# Patient Record
Sex: FEMALE | Race: ASIAN | Employment: OTHER | URBAN - METROPOLITAN AREA
[De-identification: names, ages, dates, MRNs, and addresses within clinical notes are randomized per-mention and may not be internally consistent; named-entity substitution may affect disease eponyms.]

---

## 2017-03-01 ENCOUNTER — APPOINTMENT (OUTPATIENT)
Dept: GENERAL RADIOLOGY | Facility: HOSPITAL | Age: 72
DRG: 481 | End: 2017-03-01
Attending: ORTHOPAEDIC SURGERY
Payer: MEDICARE

## 2017-03-01 ENCOUNTER — ANESTHESIA (OUTPATIENT)
Dept: SURGERY | Facility: HOSPITAL | Age: 72
DRG: 481 | End: 2017-03-01
Payer: MEDICARE

## 2017-03-01 ENCOUNTER — HOSPITAL ENCOUNTER (INPATIENT)
Facility: HOSPITAL | Age: 72
LOS: 5 days | Discharge: NURSING, REHAB OR LONG-TERM CARE | DRG: 481 | End: 2017-03-06
Attending: EMERGENCY MEDICINE | Admitting: INTERNAL MEDICINE
Payer: MEDICARE

## 2017-03-01 ENCOUNTER — ANESTHESIA EVENT (OUTPATIENT)
Dept: SURGERY | Facility: HOSPITAL | Age: 72
DRG: 481 | End: 2017-03-01
Payer: MEDICARE

## 2017-03-01 ENCOUNTER — APPOINTMENT (OUTPATIENT)
Dept: GENERAL RADIOLOGY | Facility: HOSPITAL | Age: 72
DRG: 481 | End: 2017-03-01
Attending: EMERGENCY MEDICINE
Payer: MEDICARE

## 2017-03-01 ENCOUNTER — SURGERY (OUTPATIENT)
Age: 72
End: 2017-03-01

## 2017-03-01 DIAGNOSIS — S72.002A CLOSED LEFT HIP FRACTURE, INITIAL ENCOUNTER (HCC): Primary | ICD-10-CM

## 2017-03-01 LAB
ANION GAP SERPL CALC-SCNC: 9 MMOL/L (ref 0–18)
ANTIBODY SCREEN: NEGATIVE
APTT PPP: 26.4 SECONDS (ref 23.2–35.3)
BASOPHILS # BLD: 0 K/UL (ref 0–0.2)
BASOPHILS NFR BLD: 0 %
BUN SERPL-MCNC: 8 MG/DL (ref 8–20)
BUN/CREAT SERPL: 10 (ref 10–20)
CALCIUM SERPL-MCNC: 8.5 MG/DL (ref 8.5–10.5)
CHLORIDE SERPL-SCNC: 101 MMOL/L (ref 95–110)
CO2 SERPL-SCNC: 24 MMOL/L (ref 22–32)
CREAT SERPL-MCNC: 0.8 MG/DL (ref 0.5–1.5)
EOSINOPHIL # BLD: 0.1 K/UL (ref 0–0.7)
EOSINOPHIL NFR BLD: 1 %
ERYTHROCYTE [DISTWIDTH] IN BLOOD BY AUTOMATED COUNT: 12.8 % (ref 11–15)
ERYTHROCYTE [DISTWIDTH] IN BLOOD BY AUTOMATED COUNT: 12.8 % (ref 11–15)
GLUCOSE SERPL-MCNC: 135 MG/DL (ref 70–99)
HCT VFR BLD AUTO: 31 % (ref 35–48)
HCT VFR BLD AUTO: 36 % (ref 35–48)
HGB BLD-MCNC: 10.4 G/DL (ref 12–16)
HGB BLD-MCNC: 12.1 G/DL (ref 12–16)
INR BLD: 1 (ref 0.9–1.2)
LYMPHOCYTES # BLD: 1.7 K/UL (ref 1–4)
LYMPHOCYTES NFR BLD: 25 %
MCH RBC QN AUTO: 31.6 PG (ref 27–32)
MCH RBC QN AUTO: 31.7 PG (ref 27–32)
MCHC RBC AUTO-ENTMCNC: 33.5 G/DL (ref 32–37)
MCHC RBC AUTO-ENTMCNC: 33.6 G/DL (ref 32–37)
MCV RBC AUTO: 94.2 FL (ref 80–100)
MCV RBC AUTO: 94.4 FL (ref 80–100)
MONOCYTES # BLD: 0.3 K/UL (ref 0–1)
MONOCYTES NFR BLD: 5 %
MRSA DNA SPEC QL NAA+PROBE: NEGATIVE
NEUTROPHILS # BLD AUTO: 4.4 K/UL (ref 1.8–7.7)
NEUTROPHILS NFR BLD: 68 %
OSMOLALITY UR CALC.SUM OF ELEC: 278 MOSM/KG (ref 275–295)
PLATELET # BLD AUTO: 141 K/UL (ref 140–400)
PLATELET # BLD AUTO: 173 K/UL (ref 140–400)
PMV BLD AUTO: 6.7 FL (ref 7.4–10.3)
PMV BLD AUTO: 6.7 FL (ref 7.4–10.3)
POTASSIUM SERPL-SCNC: 3.7 MMOL/L (ref 3.3–5.1)
PROTHROMBIN TIME: 12.4 SECONDS (ref 11.8–14.5)
RBC # BLD AUTO: 3.29 M/UL (ref 3.7–5.4)
RBC # BLD AUTO: 3.82 M/UL (ref 3.7–5.4)
RH BLOOD TYPE: POSITIVE
SODIUM SERPL-SCNC: 134 MMOL/L (ref 136–144)
WBC # BLD AUTO: 6.5 K/UL (ref 4–11)
WBC # BLD AUTO: 9.1 K/UL (ref 4–11)

## 2017-03-01 PROCEDURE — 36415 COLL VENOUS BLD VENIPUNCTURE: CPT | Performed by: ORTHOPAEDIC SURGERY

## 2017-03-01 PROCEDURE — 71010 XR CHEST AP PORTABLE  (CPT=71010): CPT

## 2017-03-01 PROCEDURE — 76000 FLUOROSCOPY <1 HR PHYS/QHP: CPT

## 2017-03-01 PROCEDURE — 0QS736Z REPOSITION LEFT UPPER FEMUR WITH INTRAMEDULLARY INTERNAL FIXATION DEVICE, PERCUTANEOUS APPROACH: ICD-10-PCS | Performed by: ORTHOPAEDIC SURGERY

## 2017-03-01 PROCEDURE — 86901 BLOOD TYPING SEROLOGIC RH(D): CPT | Performed by: EMERGENCY MEDICINE

## 2017-03-01 PROCEDURE — 96375 TX/PRO/DX INJ NEW DRUG ADDON: CPT

## 2017-03-01 PROCEDURE — 73552 X-RAY EXAM OF FEMUR 2/>: CPT

## 2017-03-01 PROCEDURE — 85027 COMPLETE CBC AUTOMATED: CPT | Performed by: ORTHOPAEDIC SURGERY

## 2017-03-01 PROCEDURE — 87641 MR-STAPH DNA AMP PROBE: CPT | Performed by: EMERGENCY MEDICINE

## 2017-03-01 PROCEDURE — 85025 COMPLETE CBC W/AUTO DIFF WBC: CPT | Performed by: EMERGENCY MEDICINE

## 2017-03-01 PROCEDURE — 99285 EMERGENCY DEPT VISIT HI MDM: CPT

## 2017-03-01 PROCEDURE — 93010 ELECTROCARDIOGRAM REPORT: CPT | Performed by: EMERGENCY MEDICINE

## 2017-03-01 PROCEDURE — 73502 X-RAY EXAM HIP UNI 2-3 VIEWS: CPT

## 2017-03-01 PROCEDURE — 80048 BASIC METABOLIC PNL TOTAL CA: CPT | Performed by: EMERGENCY MEDICINE

## 2017-03-01 PROCEDURE — 96361 HYDRATE IV INFUSION ADD-ON: CPT

## 2017-03-01 PROCEDURE — 96374 THER/PROPH/DIAG INJ IV PUSH: CPT

## 2017-03-01 PROCEDURE — 93005 ELECTROCARDIOGRAM TRACING: CPT

## 2017-03-01 PROCEDURE — 51702 INSERT TEMP BLADDER CATH: CPT

## 2017-03-01 PROCEDURE — 86900 BLOOD TYPING SEROLOGIC ABO: CPT | Performed by: EMERGENCY MEDICINE

## 2017-03-01 PROCEDURE — 85730 THROMBOPLASTIN TIME PARTIAL: CPT | Performed by: EMERGENCY MEDICINE

## 2017-03-01 PROCEDURE — 86850 RBC ANTIBODY SCREEN: CPT | Performed by: EMERGENCY MEDICINE

## 2017-03-01 PROCEDURE — 85610 PROTHROMBIN TIME: CPT | Performed by: EMERGENCY MEDICINE

## 2017-03-01 DEVICE — ZNN CMN NAIL 10MMX34CM 130 L
Type: IMPLANTABLE DEVICE | Status: FUNCTIONAL
Brand: ZIMMER® NATURAL NAIL® SYSTEM

## 2017-03-01 DEVICE — SCREW CORT FA 5.0X30 Z NAIL: Type: IMPLANTABLE DEVICE | Status: FUNCTIONAL

## 2017-03-01 DEVICE — ZNN CMN LAG SCREW 10.5X100
Type: IMPLANTABLE DEVICE | Status: FUNCTIONAL
Brand: ZIMMER® NATURAL NAIL® SYSTEM

## 2017-03-01 RX ORDER — MORPHINE SULFATE 2 MG/ML
2 INJECTION, SOLUTION INTRAMUSCULAR; INTRAVENOUS EVERY 10 MIN PRN
Status: DISCONTINUED | OUTPATIENT
Start: 2017-03-01 | End: 2017-03-01

## 2017-03-01 RX ORDER — GLYCOPYRROLATE 0.2 MG/ML
INJECTION INTRAMUSCULAR; INTRAVENOUS AS NEEDED
Status: DISCONTINUED | OUTPATIENT
Start: 2017-03-01 | End: 2017-03-01 | Stop reason: SURG

## 2017-03-01 RX ORDER — ONDANSETRON 2 MG/ML
4 INJECTION INTRAMUSCULAR; INTRAVENOUS ONCE
Status: COMPLETED | OUTPATIENT
Start: 2017-03-01 | End: 2017-03-01

## 2017-03-01 RX ORDER — HYDROMORPHONE HYDROCHLORIDE 1 MG/ML
0.5 INJECTION, SOLUTION INTRAMUSCULAR; INTRAVENOUS; SUBCUTANEOUS EVERY 2 HOUR PRN
Status: DISCONTINUED | OUTPATIENT
Start: 2017-03-01 | End: 2017-03-01

## 2017-03-01 RX ORDER — HYDROMORPHONE HYDROCHLORIDE 1 MG/ML
0.4 INJECTION, SOLUTION INTRAMUSCULAR; INTRAVENOUS; SUBCUTANEOUS EVERY 5 MIN PRN
Status: DISCONTINUED | OUTPATIENT
Start: 2017-03-01 | End: 2017-03-01

## 2017-03-01 RX ORDER — GLYCOPYRROLATE 0.2 MG/ML
INJECTION INTRAMUSCULAR; INTRAVENOUS AS NEEDED
Status: DISCONTINUED | OUTPATIENT
Start: 2017-03-01 | End: 2017-03-01

## 2017-03-01 RX ORDER — NEOSTIGMINE METHYLSULFATE 0.5 MG/ML
INJECTION INTRAVENOUS AS NEEDED
Status: DISCONTINUED | OUTPATIENT
Start: 2017-03-01 | End: 2017-03-01 | Stop reason: SURG

## 2017-03-01 RX ORDER — OYSTER SHELL CALCIUM WITH VITAMIN D 500; 200 MG/1; [IU]/1
1 TABLET, FILM COATED ORAL DAILY
Status: DISCONTINUED | OUTPATIENT
Start: 2017-03-02 | End: 2017-03-06

## 2017-03-01 RX ORDER — MORPHINE SULFATE 4 MG/ML
4 INJECTION, SOLUTION INTRAMUSCULAR; INTRAVENOUS EVERY 10 MIN PRN
Status: DISCONTINUED | OUTPATIENT
Start: 2017-03-01 | End: 2017-03-01

## 2017-03-01 RX ORDER — HYDROMORPHONE HYDROCHLORIDE 1 MG/ML
INJECTION, SOLUTION INTRAMUSCULAR; INTRAVENOUS; SUBCUTANEOUS
Status: COMPLETED
Start: 2017-03-01 | End: 2017-03-01

## 2017-03-01 RX ORDER — NALOXONE HYDROCHLORIDE 0.4 MG/ML
80 INJECTION, SOLUTION INTRAMUSCULAR; INTRAVENOUS; SUBCUTANEOUS AS NEEDED
Status: DISCONTINUED | OUTPATIENT
Start: 2017-03-01 | End: 2017-03-02 | Stop reason: HOSPADM

## 2017-03-01 RX ORDER — DOXEPIN HYDROCHLORIDE 50 MG/1
1 CAPSULE ORAL DAILY
COMMUNITY
End: 2021-07-07

## 2017-03-01 RX ORDER — ONDANSETRON 2 MG/ML
INJECTION INTRAMUSCULAR; INTRAVENOUS AS NEEDED
Status: DISCONTINUED | OUTPATIENT
Start: 2017-03-01 | End: 2017-03-01 | Stop reason: SURG

## 2017-03-01 RX ORDER — HYDROMORPHONE HYDROCHLORIDE 1 MG/ML
0.6 INJECTION, SOLUTION INTRAMUSCULAR; INTRAVENOUS; SUBCUTANEOUS EVERY 5 MIN PRN
Status: DISCONTINUED | OUTPATIENT
Start: 2017-03-01 | End: 2017-03-01

## 2017-03-01 RX ORDER — CHLORAL HYDRATE 500 MG
1000 CAPSULE ORAL 2 TIMES DAILY
Status: DISCONTINUED | OUTPATIENT
Start: 2017-03-02 | End: 2017-03-01

## 2017-03-01 RX ORDER — HALOPERIDOL 5 MG/ML
0.25 INJECTION INTRAMUSCULAR ONCE AS NEEDED
Status: ACTIVE | OUTPATIENT
Start: 2017-03-01 | End: 2017-03-01

## 2017-03-01 RX ORDER — SODIUM CHLORIDE 450 MG/100ML
INJECTION, SOLUTION INTRAVENOUS CONTINUOUS
Status: DISCONTINUED | OUTPATIENT
Start: 2017-03-01 | End: 2017-03-03 | Stop reason: ALTCHOICE

## 2017-03-01 RX ORDER — LOSARTAN POTASSIUM 25 MG/1
TABLET ORAL DAILY
COMMUNITY
End: 2018-05-22 | Stop reason: ALTCHOICE

## 2017-03-01 RX ORDER — CHLORAL HYDRATE 500 MG
1000 CAPSULE ORAL 2 TIMES DAILY
COMMUNITY
End: 2020-11-03

## 2017-03-01 RX ORDER — LIDOCAINE HYDROCHLORIDE 10 MG/ML
INJECTION, SOLUTION EPIDURAL; INFILTRATION; INTRACAUDAL; PERINEURAL AS NEEDED
Status: DISCONTINUED | OUTPATIENT
Start: 2017-03-01 | End: 2017-03-01 | Stop reason: SURG

## 2017-03-01 RX ORDER — ONDANSETRON 2 MG/ML
4 INJECTION INTRAMUSCULAR; INTRAVENOUS ONCE AS NEEDED
Status: ACTIVE | OUTPATIENT
Start: 2017-03-01 | End: 2017-03-01

## 2017-03-01 RX ORDER — HYDROCODONE BITARTRATE AND ACETAMINOPHEN 5; 325 MG/1; MG/1
2 TABLET ORAL AS NEEDED
Status: DISCONTINUED | OUTPATIENT
Start: 2017-03-01 | End: 2017-03-01

## 2017-03-01 RX ORDER — CHOLECALCIFEROL (VITAMIN D3) 50 MCG
1 CAPSULE ORAL DAILY
COMMUNITY

## 2017-03-01 RX ORDER — ROCURONIUM BROMIDE 10 MG/ML
INJECTION, SOLUTION INTRAVENOUS AS NEEDED
Status: DISCONTINUED | OUTPATIENT
Start: 2017-03-01 | End: 2017-03-01 | Stop reason: SURG

## 2017-03-01 RX ORDER — ONDANSETRON 2 MG/ML
4 INJECTION INTRAMUSCULAR; INTRAVENOUS EVERY 4 HOURS PRN
Status: DISCONTINUED | OUTPATIENT
Start: 2017-03-01 | End: 2017-03-06

## 2017-03-01 RX ORDER — HYDROMORPHONE HYDROCHLORIDE 1 MG/ML
0.2 INJECTION, SOLUTION INTRAMUSCULAR; INTRAVENOUS; SUBCUTANEOUS EVERY 5 MIN PRN
Status: DISCONTINUED | OUTPATIENT
Start: 2017-03-01 | End: 2017-03-01

## 2017-03-01 RX ORDER — HYDROMORPHONE HYDROCHLORIDE 1 MG/ML
0.5 INJECTION, SOLUTION INTRAMUSCULAR; INTRAVENOUS; SUBCUTANEOUS ONCE
Status: COMPLETED | OUTPATIENT
Start: 2017-03-01 | End: 2017-03-01

## 2017-03-01 RX ORDER — POTASSIUM CHLORIDE 14.9 MG/ML
20 INJECTION INTRAVENOUS ONCE
Status: COMPLETED | OUTPATIENT
Start: 2017-03-01 | End: 2017-03-01

## 2017-03-01 RX ORDER — ERGOCALCIFEROL 1.25 MG/1
50000 CAPSULE ORAL
COMMUNITY

## 2017-03-01 RX ORDER — LOSARTAN POTASSIUM 25 MG/1
25 TABLET ORAL DAILY
Status: DISCONTINUED | OUTPATIENT
Start: 2017-03-02 | End: 2017-03-03

## 2017-03-01 RX ORDER — SODIUM CHLORIDE, SODIUM LACTATE, POTASSIUM CHLORIDE, CALCIUM CHLORIDE 600; 310; 30; 20 MG/100ML; MG/100ML; MG/100ML; MG/100ML
INJECTION, SOLUTION INTRAVENOUS CONTINUOUS
Status: DISCONTINUED | OUTPATIENT
Start: 2017-03-01 | End: 2017-03-06

## 2017-03-01 RX ORDER — DOXEPIN HYDROCHLORIDE 50 MG/1
1 CAPSULE ORAL DAILY
Status: DISCONTINUED | OUTPATIENT
Start: 2017-03-02 | End: 2017-03-06

## 2017-03-01 RX ORDER — METOPROLOL SUCCINATE 50 MG/1
50 TABLET, EXTENDED RELEASE ORAL DAILY
Status: DISCONTINUED | OUTPATIENT
Start: 2017-03-02 | End: 2017-03-03

## 2017-03-01 RX ORDER — METOPROLOL SUCCINATE 50 MG/1
50 TABLET, EXTENDED RELEASE ORAL 2 TIMES DAILY
Status: ON HOLD | COMMUNITY
End: 2019-03-20

## 2017-03-01 RX ORDER — MORPHINE SULFATE 10 MG/ML
6 INJECTION, SOLUTION INTRAMUSCULAR; INTRAVENOUS EVERY 10 MIN PRN
Status: DISCONTINUED | OUTPATIENT
Start: 2017-03-01 | End: 2017-03-01

## 2017-03-01 RX ORDER — SODIUM CHLORIDE, SODIUM LACTATE, POTASSIUM CHLORIDE, CALCIUM CHLORIDE 600; 310; 30; 20 MG/100ML; MG/100ML; MG/100ML; MG/100ML
INJECTION, SOLUTION INTRAVENOUS CONTINUOUS PRN
Status: DISCONTINUED | OUTPATIENT
Start: 2017-03-01 | End: 2017-03-01 | Stop reason: SURG

## 2017-03-01 RX ORDER — DEXAMETHASONE SODIUM PHOSPHATE 4 MG/ML
VIAL (ML) INJECTION AS NEEDED
Status: DISCONTINUED | OUTPATIENT
Start: 2017-03-01 | End: 2017-03-01 | Stop reason: SURG

## 2017-03-01 RX ORDER — HYDROCODONE BITARTRATE AND ACETAMINOPHEN 5; 325 MG/1; MG/1
1 TABLET ORAL AS NEEDED
Status: DISCONTINUED | OUTPATIENT
Start: 2017-03-01 | End: 2017-03-01

## 2017-03-01 RX ORDER — ERGOCALCIFEROL 1.25 MG/1
50000 CAPSULE ORAL
Status: DISCONTINUED | OUTPATIENT
Start: 2017-03-06 | End: 2017-03-06

## 2017-03-01 RX ADMIN — LIDOCAINE HYDROCHLORIDE 50 MG: 10 INJECTION, SOLUTION EPIDURAL; INFILTRATION; INTRACAUDAL; PERINEURAL at 19:56:00

## 2017-03-01 RX ADMIN — ROCURONIUM BROMIDE 20 MG: 10 INJECTION, SOLUTION INTRAVENOUS at 20:20:00

## 2017-03-01 RX ADMIN — SODIUM CHLORIDE, SODIUM LACTATE, POTASSIUM CHLORIDE, CALCIUM CHLORIDE: 600; 310; 30; 20 INJECTION, SOLUTION INTRAVENOUS at 19:50:00

## 2017-03-01 RX ADMIN — ROCURONIUM BROMIDE 30 MG: 10 INJECTION, SOLUTION INTRAVENOUS at 19:56:00

## 2017-03-01 RX ADMIN — NEOSTIGMINE METHYLSULFATE 3 MG: 0.5 INJECTION INTRAVENOUS at 22:15:00

## 2017-03-01 RX ADMIN — GLYCOPYRROLATE 0.6 MG: 0.2 INJECTION INTRAMUSCULAR; INTRAVENOUS at 22:15:00

## 2017-03-01 RX ADMIN — DEXAMETHASONE SODIUM PHOSPHATE 4 MG: 4 MG/ML VIAL (ML) INJECTION at 20:25:00

## 2017-03-01 RX ADMIN — ONDANSETRON 4 MG: 2 INJECTION INTRAMUSCULAR; INTRAVENOUS at 21:15:00

## 2017-03-01 RX ADMIN — SODIUM CHLORIDE, SODIUM LACTATE, POTASSIUM CHLORIDE, CALCIUM CHLORIDE: 600; 310; 30; 20 INJECTION, SOLUTION INTRAVENOUS at 22:00:00

## 2017-03-01 NOTE — ED PROVIDER NOTES
Patient Seen in: [facility] AND [facility] Emergency Department    History   Patient presents with:  Lower Extremity Injury (musculoskeletal)    Stated Complaint: fall, l hip pain     HPI    74 yo F with PMH HTN presenting for evaluation of left hip pain while doi MMM.  Head: Normocephalic. Atraumatic. Eyes: No injection. Pupils midrange and equally reactive. Neck: Neck supple. No midline tenderness/stepoff/deformity. Cardiovascular: RRR. Pulmonary/Chest: Effort normal. CTAB. Nontender. Abdominal: Soft.  Nonten

## 2017-03-01 NOTE — PROGRESS NOTES
Pharmacy Note: Dietary Supplement Discontinuation Per Policy    Omega-3 fatty acids (Fish Oil) has been discontinued on Quynh T Ni per policy. This supplement may be restarted upon discharge using the medication reconciliation process.     Thank you,   ELIO

## 2017-03-01 NOTE — H&P
[place] [facility]    H&P  [facility]    Robel Small Patient Status:  Inpatient    1945 MRN [id number]   Location [facility] 1SW Attending Yolis Guido MD   Hosp Day # 0 PCP Sosa Gage MD     Date of Admission tablet, 1 tablet, Oral, Daily    ROS:  Constitutional: negative  Eyes: negative  Ears, nose, mouth, throat, and face: negative  Respiratory: negative  Cardiovascular: negative  Gastrointestinal: negative  Genitourinary:negative  Integument/breast: negative Hypertension    Plan:    Cardiology evaluation before OR  Pain control     Discussed with Dr. Nafisa Robert MD  3/1/2017  5:17 PM

## 2017-03-01 NOTE — HISTORICAL OFFICE NOTE
TEX HERNANDEZ  : 1945  ACCOUNT:  [id number]  [phone]  PCP: Dr. Ulysses Inoue     TODAY'S DATE: 10/04/2016  DICTATED BY:  Pilar Long M.D.]    CHIEF COMPLAINT: [Followup of Palpitations.]    HPI: [On 10/04/2016, Radha Spann, a 73year old female, presen mucosa pink and moist. NECK: jugular venous pressure not elevated. RESP: respirations with normal rate and rhythm, clear to auscultation. GI: soft, nontender. MS: adequate gait for exercise/testing. EXT: no clubbing or cyanosis.   SKIN: no rashes, lesions, Transthoracic echocardiography. Image quality was good.   Scanning was performed from the parasternal, apical, and subcostal acoustic windows. Study completion: The patient tolerated the procedure well. There   were no complications.  Transthoracic echocard 04/20/1945 Age: 74  Gender:F HT:65 in WT:134 lb  BP: 159 / 80   Study date:Jul 17 2012 7:09AM Location: [facility]   Ordering MD:      Davian Denson MD   Primary MD:        Karri Frey MD   Interpreting Physician:  Davian Denson MD  Sonographer:      Concha Da Silva Normal     Left ventricle  LVID, ED    46 mm    37­56     LVID, ES    34.1 mm      ­­     FS    * 26 %    29­45     LVPW, ED    8.9 mm    6­11     IVS/LVPW ratio, ED    1.16       ­­     Rel wall thickness, ED    0.39     <0.45     EF, [facility]    * 50.8 The aortic root was normal in size. Mitral valve: Normal thickness leaflets. . Mobility was not restricted. Doppler: There was no evidence for stenosis. Mild regurgitation. Peak gradient: 3 mm   Hg (D). Left atrium:  The atrium was normal in size.   Right * 2.52 cm/m²    <2.2     Vol, S    43 ml      ­­     Vol index, S    25.7 ml/m²      ­­     Doppler measurements       Normal     Main pulmonary artery  Pressure, S    * 32 mm Hg   ?30     Pressure ED    15 mm Hg     ­­     Left ventricle  Ea, med juan miguel, tis

## 2017-03-02 ENCOUNTER — APPOINTMENT (OUTPATIENT)
Dept: CT IMAGING | Facility: HOSPITAL | Age: 72
DRG: 481 | End: 2017-03-02
Attending: INTERNAL MEDICINE
Payer: MEDICARE

## 2017-03-02 LAB
ALBUMIN SERPL BCP-MCNC: 2.9 G/DL (ref 3.5–4.8)
ALBUMIN/GLOB SERPL: 1.3 {RATIO} (ref 1–2)
ALP SERPL-CCNC: 38 U/L (ref 32–100)
ALT SERPL-CCNC: 16 U/L (ref 14–54)
ANION GAP SERPL CALC-SCNC: 7 MMOL/L (ref 0–18)
AST SERPL-CCNC: 26 U/L (ref 15–41)
BASOPHILS # BLD: 0 K/UL (ref 0–0.2)
BASOPHILS # BLD: 0 K/UL (ref 0–0.2)
BASOPHILS NFR BLD: 0 %
BASOPHILS NFR BLD: 0 %
BILIRUB SERPL-MCNC: 1 MG/DL (ref 0.3–1.2)
BUN SERPL-MCNC: 9 MG/DL (ref 8–20)
BUN/CREAT SERPL: 9.4 (ref 10–20)
CALCIUM SERPL-MCNC: 8.3 MG/DL (ref 8.5–10.5)
CHLORIDE SERPL-SCNC: 101 MMOL/L (ref 95–110)
CO2 SERPL-SCNC: 25 MMOL/L (ref 22–32)
CREAT SERPL-MCNC: 0.96 MG/DL (ref 0.5–1.5)
EOSINOPHIL # BLD: 0 K/UL (ref 0–0.7)
EOSINOPHIL # BLD: 0 K/UL (ref 0–0.7)
EOSINOPHIL NFR BLD: 0 %
EOSINOPHIL NFR BLD: 0 %
ERYTHROCYTE [DISTWIDTH] IN BLOOD BY AUTOMATED COUNT: 12.8 % (ref 11–15)
ERYTHROCYTE [DISTWIDTH] IN BLOOD BY AUTOMATED COUNT: 13 % (ref 11–15)
GLOBULIN PLAS-MCNC: 2.3 G/DL (ref 2.5–3.7)
GLUCOSE SERPL-MCNC: 145 MG/DL (ref 70–99)
HCT VFR BLD AUTO: 27.9 % (ref 35–48)
HCT VFR BLD AUTO: 28.4 % (ref 35–48)
HGB BLD-MCNC: 9.4 G/DL (ref 12–16)
HGB BLD-MCNC: 9.6 G/DL (ref 12–16)
LYMPHOCYTES # BLD: 0.7 K/UL (ref 1–4)
LYMPHOCYTES # BLD: 1.9 K/UL (ref 1–4)
LYMPHOCYTES NFR BLD: 11 %
LYMPHOCYTES NFR BLD: 20 %
MCH RBC QN AUTO: 32 PG (ref 27–32)
MCH RBC QN AUTO: 32.1 PG (ref 27–32)
MCHC RBC AUTO-ENTMCNC: 33.9 G/DL (ref 32–37)
MCHC RBC AUTO-ENTMCNC: 34 G/DL (ref 32–37)
MCV RBC AUTO: 94.3 FL (ref 80–100)
MCV RBC AUTO: 94.5 FL (ref 80–100)
MONOCYTES # BLD: 0.3 K/UL (ref 0–1)
MONOCYTES # BLD: 0.8 K/UL (ref 0–1)
MONOCYTES NFR BLD: 4 %
MONOCYTES NFR BLD: 8 %
NEUTROPHILS # BLD AUTO: 6 K/UL (ref 1.8–7.7)
NEUTROPHILS # BLD AUTO: 7.2 K/UL (ref 1.8–7.7)
NEUTROPHILS NFR BLD: 72 %
NEUTROPHILS NFR BLD: 86 %
OSMOLALITY UR CALC.SUM OF ELEC: 277 MOSM/KG (ref 275–295)
PLATELET # BLD AUTO: 140 K/UL (ref 140–400)
PLATELET # BLD AUTO: 144 K/UL (ref 140–400)
PMV BLD AUTO: 6.9 FL (ref 7.4–10.3)
PMV BLD AUTO: 6.9 FL (ref 7.4–10.3)
POTASSIUM SERPL-SCNC: 4.4 MMOL/L (ref 3.3–5.1)
PROT SERPL-MCNC: 5.2 G/DL (ref 5.9–8.4)
RBC # BLD AUTO: 2.95 M/UL (ref 3.7–5.4)
RBC # BLD AUTO: 3.01 M/UL (ref 3.7–5.4)
SODIUM SERPL-SCNC: 133 MMOL/L (ref 136–144)
WBC # BLD AUTO: 7 K/UL (ref 4–11)
WBC # BLD AUTO: 9.9 K/UL (ref 4–11)

## 2017-03-02 PROCEDURE — 80053 COMPREHEN METABOLIC PANEL: CPT | Performed by: INTERNAL MEDICINE

## 2017-03-02 PROCEDURE — 97110 THERAPEUTIC EXERCISES: CPT

## 2017-03-02 PROCEDURE — 97161 PT EVAL LOW COMPLEX 20 MIN: CPT

## 2017-03-02 PROCEDURE — 85025 COMPLETE CBC W/AUTO DIFF WBC: CPT | Performed by: INTERNAL MEDICINE

## 2017-03-02 PROCEDURE — 71260 CT THORAX DX C+: CPT

## 2017-03-02 RX ORDER — HYDROCODONE BITARTRATE AND ACETAMINOPHEN 7.5; 325 MG/1; MG/1
1 TABLET ORAL EVERY 4 HOURS PRN
Status: DISCONTINUED | OUTPATIENT
Start: 2017-03-02 | End: 2017-03-06

## 2017-03-02 RX ORDER — SODIUM CHLORIDE 0.9 % (FLUSH) 0.9 %
10 SYRINGE (ML) INJECTION AS NEEDED
Status: DISCONTINUED | OUTPATIENT
Start: 2017-03-02 | End: 2017-03-06

## 2017-03-02 RX ORDER — POLYETHYLENE GLYCOL 3350 17 G/17G
17 POWDER, FOR SOLUTION ORAL DAILY PRN
Status: DISCONTINUED | OUTPATIENT
Start: 2017-03-02 | End: 2017-03-06

## 2017-03-02 RX ORDER — ENOXAPARIN SODIUM 100 MG/ML
40 INJECTION SUBCUTANEOUS DAILY
Status: DISCONTINUED | OUTPATIENT
Start: 2017-03-02 | End: 2017-03-04

## 2017-03-02 RX ORDER — SODIUM PHOSPHATE, DIBASIC AND SODIUM PHOSPHATE, MONOBASIC 7; 19 G/133ML; G/133ML
1 ENEMA RECTAL ONCE AS NEEDED
Status: ACTIVE | OUTPATIENT
Start: 2017-03-02 | End: 2017-03-02

## 2017-03-02 RX ORDER — WARFARIN SODIUM 5 MG/1
5 TABLET ORAL NIGHTLY
Status: DISCONTINUED | OUTPATIENT
Start: 2017-03-02 | End: 2017-03-04

## 2017-03-02 RX ORDER — HYDROCODONE BITARTRATE AND ACETAMINOPHEN 10; 325 MG/1; MG/1
1 TABLET ORAL EVERY 4 HOURS PRN
Status: DISCONTINUED | OUTPATIENT
Start: 2017-03-02 | End: 2017-03-06

## 2017-03-02 RX ORDER — DOCUSATE SODIUM 100 MG/1
100 CAPSULE, LIQUID FILLED ORAL 2 TIMES DAILY
Status: DISCONTINUED | OUTPATIENT
Start: 2017-03-02 | End: 2017-03-06

## 2017-03-02 RX ORDER — BISACODYL 10 MG
10 SUPPOSITORY, RECTAL RECTAL
Status: DISCONTINUED | OUTPATIENT
Start: 2017-03-02 | End: 2017-03-06

## 2017-03-02 RX ORDER — MORPHINE SULFATE 2 MG/ML
2 INJECTION, SOLUTION INTRAMUSCULAR; INTRAVENOUS EVERY 4 HOURS PRN
Status: DISCONTINUED | OUTPATIENT
Start: 2017-03-02 | End: 2017-03-06

## 2017-03-02 RX ORDER — HYDROCODONE BITARTRATE AND ACETAMINOPHEN 5; 325 MG/1; MG/1
1 TABLET ORAL EVERY 4 HOURS PRN
Status: DISCONTINUED | OUTPATIENT
Start: 2017-03-02 | End: 2017-03-06

## 2017-03-02 NOTE — OPERATIVE REPORT
[facility]    PATIENT'S NAME: TEX HERNANDEZ   ATTENDING PHYSICIAN: Nick Cameron MD   OPERATING PHYSICIAN: Blanca Bravo.  MD Carrington   PATIENT ACCOUNT#:   [de-identified]    LOCATION:  [facility] [address] #:   [id number]       DATE OF BIRTH:  [birth year] painted with DuraPrep and that was allowed to dry. The draping was completed with application of vertical sheeting with an Ioban adhesive component of the drape applied over the exposed skin area.       A \"time-out\" process was completed in this time fra was placed through the 130-degree position of the targeting arm with a skin impression that was then made and used as a guide for a skin incision and deeper incision to allow delivery of the bushing system to the lateral cortex of the femur.   Distally thre measurement was taken, and a self-tapping cortical screw of appropriate length was then placed with secure fixation.       This process was repeated over the more distal static fixation hole in the pin at its distal aspect, and the offset drill was again us

## 2017-03-02 NOTE — PROGRESS NOTES
[facility] Cardiology  Progress Note    Jaky Moore Patient Status:  Inpatient    1945 MRN [id number]   Location [facility] 4W/SW/SE Attending Serene Roper MD   Hosp Day # 1 PCP Tamanna Torres MD     [id number] 03/02/2017   BILT 1.0 03/02/2017   TP 5.2* 03/02/2017   AST 26 03/02/2017   ALT 16 03/02/2017   PTT 26.4 03/01/2017   INR 1.0 03/01/2017   T4F 1.04 10/15/2016   TSH 3.30 10/15/2016       Xr Or Femur (2 Views) With <60 Min C-arm  (cpt=73550/7600

## 2017-03-02 NOTE — CONSULTS
Cardiology (Consult dictated)    Assessment:  1. Acute hip fracture. Will need surgical repair. 2. History of PVCs, asymptomatic. No history of CAD or CHF, or recent symptoms. EKG T waves are normal variant.     Expect low risk of cardiac complications r

## 2017-03-02 NOTE — BRIEF OP NOTE
[facility] [address]  Brief Op Note     Dev Geiger Location: OR   [facility] [id number] MRN [id number]   Admission Date 3/1/2017 Operation Date 3/1/2017   Attending Physician Emely Zayas MD Operating Physician Maida Irby MD       Pre-Operative

## 2017-03-02 NOTE — PHYSICAL THERAPY NOTE
Attempted to see pt this PM but the pt was off floor for CT scan will attempt later as time permits.

## 2017-03-02 NOTE — PROGRESS NOTES
[facility]    Progress Note  [facility]    Ish Harrison Patient Status:  Inpatient    1945 MRN [id number]   Location [facility] 4W/SW/SE Attending Tania Denny MD   Hosp Day # 1 PCP Melissa Brand MD       As [START ON 3/6/2017] ergocalciferol (DRISDOL/VITAMIN D2) cap 50,000 Units, 50,000 Units, Oral, Q7 Days  •  Losartan Potassium (COZAAR) tab 25 mg, 25 mg, Oral, Daily  •  Metoprolol Succinate ER (Toprol XL) 24 hr tab 50 mg, 50 mg, Oral, Daily  •  multivitamin atypical pneumonitis        Xr Hip W Or Wo Pelvis 2 Or 3 Views, Left (cpt=73502)    3/1/2017  CONCLUSION:  1. Acute comminuted left femoral intertrochanteric fracture with subtrochanteric extension. 2. Moderate to severe bilateral hip osteoarthritis.

## 2017-03-02 NOTE — ANESTHESIA PREPROCEDURE EVALUATION
Anesthesia PreOp Note    HPI:     Ish Harrison is a 73year old female who presents for preoperative consultation requested by: Moi Zamudio MD    Date of Surgery: 3/1/2017    Procedure(s):  HIP PINNING  Indication: Closed left hip fracture, initial encoun IVPB premix 20 mEq 20 mEq Intravenous Once Negrita Scott MD Last Rate: 50 mL/hr at 03/01/17 1914 20 mEq at 03/01/17 1914   [START ON 3/2/2017] STRESS FORMULA/ZINC (STRESS FORMULA) tab 1 tablet 1 tablet Oral Daily Negrita Scott MD     [START ON 3/2/20 Signs: Body mass index is 24.53 kg/(m^2). height is 1.626 m (5' 4\") and weight is 64.864 kg (143 lb). Her oral temperature is 98.2 °F (36.8 °C). Her blood pressure is 141/53 and her pulse is 79. Her respiration is 16 and oxygen saturation is 99%.     03

## 2017-03-02 NOTE — CONSULTS
[facility]    PATIENT'S NAME: TEX HERNANDEZ   ATTENDING PHYSICIAN: Jensen Garcia MD   CONSULTING PHYSICIAN: Santos Lovelace.  MD Carrington   PATIENT ACCOUNT#:   [de-identified]    LOCATION:  [facility] 3 [facility] 10  MEDICAL RECORD #:   [id number]       DATE OF B no acute distress. She complains only of left hip pain. The patient denies any injury to her upper extremities, neck, back, or right lower extremity in any way.   She denies having struck her head in any way or experiencing any change in level of consciou crest compression or with palpation over either superior pubic ramus or over the symphysis pubis.     LABORATORY DATA:  Admission laboratory studies showed INR of 1.0, PTT 26.4, white blood cell count 6500, hemoglobin 12.1 g%, hematocrit 36.0%, platelet cou

## 2017-03-02 NOTE — CONSULTS
[facility]    PATIENT'S NAME: TEX HERNANDEZ   ATTENDING PHYSICIAN: Guero Peters MD   CONSULTING PHYSICIAN: Marivel Chavez.  Sunday Rodarte MD   PATIENT ACCOUNT#:   [id number]    LOCATION:  [facility] 3 [facility] 10  MEDICAL RECORD #:   [id number]       DATE OF BI above.      PHYSICAL EXAMINATION:    GENERAL:  Well-developed, well-nourished female in no acute distress. Alert and oriented x3.     VITAL SIGNS:  Blood pressure 141/53, pulse 70 regular rhythm, respirations 16 breathing unlabored, afebrile, saturation 10 PVCs, would suggest remote telemetry postop until discharge. Thank you for this consultation. We will follow. (Also Job [id number])    Dictated By Jagjit Purvis.  Cornelia Keys MD  d: 03/01/2017 18:11:44  t: 03/01/2017 19:33:46  Job [id number]  [facility]/

## 2017-03-02 NOTE — PROGRESS NOTES
ORTHO SURG: PO#1  AFEB. AM LABS: WBC = 7,000, H/H = 9.6 / 28.4, PLATELETS = 055,367. PAIN IS CONTROLLED. PE: ALERT, NAD, MOTORS TO LEFT TOES AND ANKLE ARE ALL 5/5, LEFT HIP AND THIGH DRESSINGS ARE DRY AND INTACT. ASSESS: SATISFACTORY PO #1.  PLAN: BEGIN P.

## 2017-03-02 NOTE — ANESTHESIA POSTPROCEDURE EVALUATION
Patient: Aurelia Reyes    Procedure Summary     Date Anesthesia Start Anesthesia Stop Room / Location    03/01/17 1951 2238 300 [facility] MAIN OR 12 / 300 [facility] MAIN OR       Procedure Diagnosis Surgeon Responsible Provider    HIP PINNING (Left ) Closed left hip fracture, in

## 2017-03-02 NOTE — PLAN OF CARE
DISCHARGE PLANNING    • Discharge to home or other facility with appropriate resources Progressing    [address] [place] [address]    Impaired Functional Mobility    • Achieve highest/safest level of mobility/gait Progressing        PAIN - ADULT    • Verb

## 2017-03-02 NOTE — PROGRESS NOTES
ORTHO SURG: FULL EVALUATION DICTATED. DX: BOO - ROCHELLE IV FRACTURE OF LEFT HIP.  PLAN: OR THIS PM.

## 2017-03-02 NOTE — PHYSICAL THERAPY NOTE
PHYSICAL THERAPY EVALUATION - INPATIENT     Room Number: 425/425-A  Evaluation Date: 3/2/2017  Type of Evaluation: Initial  Physician Order: PT Eval and Treat    Presenting Problem: left hip fracture  Reason for Therapy: Mobility Dysfunction and Discha Lower extremity ROM is within functional limits; L LE ROM limited in all planes at her hip and knee secondary to elevated pains levels and muscle guarding.     The pt displayed L LE strength deficit related to pain, pt was unable to move her L LE during complete all of her there ex  Patient End of Session: In bed;Needs met; All patient questions and concerns addressed;RN aware of session/findings;Call light within reach; Family present    ASSESSMENT     Patient is a 73year old female admitted 3/1/2017 for she appears to have a fear of falling. The pt was returned to bed with max A x2 and all needs in reach after her session.  The pt is currently presenting with pain, mobility and strength deficits related to her L hip procedure and she will  Continue to bene

## 2017-03-02 NOTE — CDS QUERY
Clinical Significance – Lab Results Associated with Blood Loss  CLINICAL DOCUMENTATION CLARIFICATION FORM  Dear Doctor: Jeffrey Albarado  Clinical information (provided below) indicates blood loss and abnormal blood counts.  For accurate ICD-10-CM code assignment to r

## 2017-03-03 LAB
ANION GAP SERPL CALC-SCNC: 6 MMOL/L (ref 0–18)
BASOPHILS # BLD: 0 K/UL (ref 0–0.2)
BASOPHILS NFR BLD: 0 %
BUN SERPL-MCNC: 10 MG/DL (ref 8–20)
BUN/CREAT SERPL: 9.7 (ref 10–20)
CALCIUM SERPL-MCNC: 8.4 MG/DL (ref 8.5–10.5)
CHLORIDE SERPL-SCNC: 104 MMOL/L (ref 95–110)
CO2 SERPL-SCNC: 28 MMOL/L (ref 22–32)
CREAT SERPL-MCNC: 1.03 MG/DL (ref 0.5–1.5)
EOSINOPHIL # BLD: 0 K/UL (ref 0–0.7)
EOSINOPHIL NFR BLD: 0 %
ERYTHROCYTE [DISTWIDTH] IN BLOOD BY AUTOMATED COUNT: 12.9 % (ref 11–15)
GLUCOSE SERPL-MCNC: 132 MG/DL (ref 70–99)
HCT VFR BLD AUTO: 25 % (ref 35–48)
HCT VFR BLD AUTO: 26.8 % (ref 35–48)
HGB BLD-MCNC: 8.5 G/DL (ref 12–16)
HGB BLD-MCNC: 9.1 G/DL (ref 12–16)
INR BLD: 1.2 (ref 0.9–1.2)
LYMPHOCYTES # BLD: 1.6 K/UL (ref 1–4)
LYMPHOCYTES NFR BLD: 23 %
MCH RBC QN AUTO: 32.2 PG (ref 27–32)
MCHC RBC AUTO-ENTMCNC: 34.1 G/DL (ref 32–37)
MCV RBC AUTO: 94.3 FL (ref 80–100)
MONOCYTES # BLD: 0.7 K/UL (ref 0–1)
MONOCYTES NFR BLD: 10 %
NEUTROPHILS # BLD AUTO: 4.7 K/UL (ref 1.8–7.7)
NEUTROPHILS NFR BLD: 67 %
OSMOLALITY UR CALC.SUM OF ELEC: 287 MOSM/KG (ref 275–295)
PLATELET # BLD AUTO: 126 K/UL (ref 140–400)
PMV BLD AUTO: 6.6 FL (ref 7.4–10.3)
POTASSIUM SERPL-SCNC: 3.7 MMOL/L (ref 3.3–5.1)
PROTHROMBIN TIME: 14.4 SECONDS (ref 11.8–14.5)
RBC # BLD AUTO: 2.65 M/UL (ref 3.7–5.4)
SODIUM SERPL-SCNC: 138 MMOL/L (ref 136–144)
WBC # BLD AUTO: 7.1 K/UL (ref 4–11)

## 2017-03-03 PROCEDURE — 97530 THERAPEUTIC ACTIVITIES: CPT

## 2017-03-03 PROCEDURE — 97165 OT EVAL LOW COMPLEX 30 MIN: CPT

## 2017-03-03 PROCEDURE — 80048 BASIC METABOLIC PNL TOTAL CA: CPT | Performed by: INTERNAL MEDICINE

## 2017-03-03 PROCEDURE — 85025 COMPLETE CBC W/AUTO DIFF WBC: CPT | Performed by: ORTHOPAEDIC SURGERY

## 2017-03-03 PROCEDURE — 85014 HEMATOCRIT: CPT | Performed by: INTERNAL MEDICINE

## 2017-03-03 PROCEDURE — 97110 THERAPEUTIC EXERCISES: CPT

## 2017-03-03 PROCEDURE — 85018 HEMOGLOBIN: CPT | Performed by: INTERNAL MEDICINE

## 2017-03-03 PROCEDURE — 85610 PROTHROMBIN TIME: CPT | Performed by: ORTHOPAEDIC SURGERY

## 2017-03-03 RX ORDER — SODIUM CHLORIDE 9 MG/ML
INJECTION, SOLUTION INTRAVENOUS CONTINUOUS
Status: DISCONTINUED | OUTPATIENT
Start: 2017-03-03 | End: 2017-03-05

## 2017-03-03 RX ORDER — SODIUM CHLORIDE 9 MG/ML
INJECTION, SOLUTION INTRAVENOUS
Status: COMPLETED
Start: 2017-03-03 | End: 2017-03-03

## 2017-03-03 RX ORDER — METOPROLOL SUCCINATE 25 MG/1
25 TABLET, EXTENDED RELEASE ORAL
Status: DISCONTINUED | OUTPATIENT
Start: 2017-03-05 | End: 2017-03-03

## 2017-03-03 NOTE — PHYSICAL THERAPY NOTE
PHYSICAL THERAPY HIP TREATMENT NOTE - INPATIENT    Room Number: 425/425-A       Number of Visits to Meet Established Goals: 10    Presenting Problem: left hip fracture    Problem List  Principal Problem:    Closed left hip fracture, initial encounter ([facility] the side of the bed?: Unable   How much help from another person does the patient currently need. ..   -   Moving to and from a bed to a chair (including a wheelchair)?: Total   -   Need to walk in hospital room?: Total   -   Climbing 3-5 steps with a raili Standing heel/toe raises     Hamstring Curls     Forward, back steps     Short Squats       Patient End of Session: In bed;Needs met;Call light within reach;RN aware of session/findings; All patient questions and concerns addressed;SCDs in place; Family pr

## 2017-03-03 NOTE — PROGRESS NOTES
ORTHO SURG:  PO#2  AFEB. AM LABS:  INR = 1.2, WBC = 7,100, H/H 8.5 / 25.0, PLATELETS = 123,504. PATIENT WAS NOT OUT OF BED YESTERDAY, WAS RELUCTANT TO TAKE ANALGESICS ALSO.  TAKING NORCO THIS AM. PE: ALERT, NAD, MODERATE LEFT THIGH SWELLING, LEFT HIP AND T

## 2017-03-03 NOTE — PROGRESS NOTES
[facility]    Progress Note  [facility]    Krunal Wise Patient Status:  Inpatient    1945 MRN [id number]   Location [facility] 4W/SW/SE Attending Rebecca Peterson MD   Hosp Day # 2 PCP Marivel Hurtado MD       As MG-UNIT per tab 1 tablet, 1 tablet, Oral, Daily  •  [START ON 3/6/2017] ergocalciferol (DRISDOL/VITAMIN D2) cap 50,000 Units, 50,000 Units, Oral, Q7 Days  •  Losartan Potassium (COZAAR) tab 25 mg, 25 mg, Oral, Daily  •  Metoprolol Succinate ER (Toprol XL) Hepatic steatosis. 4. Lesser incidental findings as above. Xr Or Femur (2 Views) With <60 Min C-arm  (cpt=73550/77832)    3/2/2017  CONCLUSION:  1. Status post ORIF left intratrochanteric fracture.          Xr Chest Ap Portable  (cpt=71010)    3/1/2

## 2017-03-03 NOTE — PLAN OF CARE
Dr. Castle Sides notified of vasal vagal episode while working with PT. Per physical therapist, Dennis Medrano attempted to stand for the second time when she sat again and became unresponsive and blacked out.   Per dr. Janett Bee he would like to give a 500 cc bolus of fluids

## 2017-03-03 NOTE — OCCUPATIONAL THERAPY NOTE
OCCUPATIONAL THERAPY EVALUATION - INPATIENT      Room Number: 425/425-A  Evaluation Date: 3/3/2017  Type of Evaluation: Initial  Presenting Problem: s/p L hip intramedullary fixation of 4 part intertrochanteric fx    Physician Order: IP Consult to Eileen ''TAN'' Us with rail; 12 to bedroom w/rail  Assistive Device(s) Used: none    Prior Level of [place]: Pt was independent prior to surgery with ADLs, IADLs, driving, exercising (fell during Yoga class). SUBJECTIVE  \"I'm feeling a little dizzy. \"     Patient Transfer: N/t; unable at this time due to decreased BP  Shower Transfer: n/t  Chair Transfer: n/t; unable due to decreased BP  Car Transfer: n/t    Bedroom Mobility: n/t      FUNCTIONAL ADL ASSESSMENT  Grooming: SBA/set up bed level  Bathing: n/t   Upper B

## 2017-03-04 LAB
ANION GAP SERPL CALC-SCNC: 5 MMOL/L (ref 0–18)
BASOPHILS # BLD: 0 K/UL (ref 0–0.2)
BASOPHILS # BLD: 0 K/UL (ref 0–0.2)
BASOPHILS NFR BLD: 0 %
BASOPHILS NFR BLD: 1 %
BUN SERPL-MCNC: 6 MG/DL (ref 8–20)
BUN/CREAT SERPL: 7.5 (ref 10–20)
CALCIUM SERPL-MCNC: 7.9 MG/DL (ref 8.5–10.5)
CHLORIDE SERPL-SCNC: 108 MMOL/L (ref 95–110)
CO2 SERPL-SCNC: 28 MMOL/L (ref 22–32)
CREAT SERPL-MCNC: 0.8 MG/DL (ref 0.5–1.5)
EOSINOPHIL # BLD: 0.1 K/UL (ref 0–0.7)
EOSINOPHIL # BLD: 0.1 K/UL (ref 0–0.7)
EOSINOPHIL NFR BLD: 1 %
EOSINOPHIL NFR BLD: 2 %
ERYTHROCYTE [DISTWIDTH] IN BLOOD BY AUTOMATED COUNT: 13 % (ref 11–15)
ERYTHROCYTE [DISTWIDTH] IN BLOOD BY AUTOMATED COUNT: 13.1 % (ref 11–15)
GLUCOSE SERPL-MCNC: 110 MG/DL (ref 70–99)
HCT VFR BLD AUTO: 22.4 % (ref 35–48)
HCT VFR BLD AUTO: 24 % (ref 35–48)
HCT VFR BLD AUTO: 24 % (ref 35–48)
HGB BLD-MCNC: 7.5 G/DL (ref 12–16)
HGB BLD-MCNC: 8 G/DL (ref 12–16)
HGB BLD-MCNC: 8 G/DL (ref 12–16)
INR BLD: 2.1 (ref 0.9–1.2)
LYMPHOCYTES # BLD: 1.4 K/UL (ref 1–4)
LYMPHOCYTES # BLD: 1.4 K/UL (ref 1–4)
LYMPHOCYTES NFR BLD: 22 %
LYMPHOCYTES NFR BLD: 23 %
MCH RBC QN AUTO: 31.8 PG (ref 27–32)
MCH RBC QN AUTO: 31.9 PG (ref 27–32)
MCHC RBC AUTO-ENTMCNC: 33.5 G/DL (ref 32–37)
MCHC RBC AUTO-ENTMCNC: 33.6 G/DL (ref 32–37)
MCV RBC AUTO: 95 FL (ref 80–100)
MCV RBC AUTO: 95 FL (ref 80–100)
MONOCYTES # BLD: 0.6 K/UL (ref 0–1)
MONOCYTES # BLD: 0.7 K/UL (ref 0–1)
MONOCYTES NFR BLD: 10 %
MONOCYTES NFR BLD: 11 %
NEUTROPHILS # BLD AUTO: 3.9 K/UL (ref 1.8–7.7)
NEUTROPHILS # BLD AUTO: 4.2 K/UL (ref 1.8–7.7)
NEUTROPHILS NFR BLD: 65 %
NEUTROPHILS NFR BLD: 66 %
OSMOLALITY UR CALC.SUM OF ELEC: 290 MOSM/KG (ref 275–295)
PLATELET # BLD AUTO: 120 K/UL (ref 140–400)
PLATELET # BLD AUTO: 130 K/UL (ref 140–400)
PMV BLD AUTO: 6.7 FL (ref 7.4–10.3)
PMV BLD AUTO: 7 FL (ref 7.4–10.3)
POTASSIUM SERPL-SCNC: 3.8 MMOL/L (ref 3.3–5.1)
PROTHROMBIN TIME: 23.1 SECONDS (ref 11.8–14.5)
RBC # BLD AUTO: 2.36 M/UL (ref 3.7–5.4)
RBC # BLD AUTO: 2.52 M/UL (ref 3.7–5.4)
SODIUM SERPL-SCNC: 141 MMOL/L (ref 136–144)
WBC # BLD AUTO: 6 K/UL (ref 4–11)
WBC # BLD AUTO: 6.4 K/UL (ref 4–11)

## 2017-03-04 PROCEDURE — 85025 COMPLETE CBC W/AUTO DIFF WBC: CPT | Performed by: ORTHOPAEDIC SURGERY

## 2017-03-04 PROCEDURE — 85610 PROTHROMBIN TIME: CPT | Performed by: INTERNAL MEDICINE

## 2017-03-04 PROCEDURE — 97110 THERAPEUTIC EXERCISES: CPT

## 2017-03-04 PROCEDURE — 80048 BASIC METABOLIC PNL TOTAL CA: CPT | Performed by: INTERNAL MEDICINE

## 2017-03-04 PROCEDURE — 85014 HEMATOCRIT: CPT | Performed by: INTERNAL MEDICINE

## 2017-03-04 PROCEDURE — 97530 THERAPEUTIC ACTIVITIES: CPT

## 2017-03-04 PROCEDURE — 85018 HEMOGLOBIN: CPT | Performed by: INTERNAL MEDICINE

## 2017-03-04 RX ORDER — WARFARIN SODIUM 2 MG/1
2 TABLET ORAL NIGHTLY
Status: DISCONTINUED | OUTPATIENT
Start: 2017-03-04 | End: 2017-03-05

## 2017-03-04 RX ORDER — 0.9 % SODIUM CHLORIDE 0.9 %
VIAL (ML) INJECTION
Status: COMPLETED
Start: 2017-03-04 | End: 2017-03-04

## 2017-03-04 NOTE — PROGRESS NOTES
[place] [facility] - [facility]    Progress Note  [facility]    Demarcus Schilling Patient Status:  Inpatient    1945 MRN [id number]   Location [facility] 4W/SW/SE Attending Tavo Pascual MD   Hosp Day # 3 PCP Adonis White MD       As cap 50,000 Units, 50,000 Units, Oral, Q7 Days  •  multivitamin (ADULT) tab 1 tablet, 1 tablet, Oral, Daily  •  lactated ringers infusion, , Intravenous, Continuous    Objective:   Blood pressure 112/50, pulse 77, temperature 98.1 °F (36.7 °C), temperature

## 2017-03-04 NOTE — PHYSICAL THERAPY NOTE
PHYSICAL THERAPY HIP TREATMENT NOTE - INPATIENT    Room Number: 849/090-Q       Number of Visits to Meet Established Goals: 10    Presenting Problem: L hip fracture    Problem List  Principal Problem:    Closed left hip [facility]  A toward ambulation with upgrade to WB, neuromuscular reeducation to improve Pt's awareness of COM over changing CHIDI and decreased risk of falls.  present for therapy this session.     DISCHARGE RECOMMENDATIONS  PT Discharge Recommendations: Sub-acute reps    Glut Sets 10 reps    Hip Abd/Add     Heel slides 3  Reps/AA to LLE    Saq     Sitting Knee Extension 10 reps/AA to LLE    Standing Hip Flexion 10 reps/AA to LLE only    Hamstring Curls     Forward, back steps     Short Squats       Patient End of S

## 2017-03-04 NOTE — PROGRESS NOTES
[place] [facility]    Progress Note    Arielle Kim Patient Status:  Inpatient    1945 MRN [id number]   Location [facility] 4W/SW/SE Attending Santo Parr MD   Hosp Day # 3 PCP Derian Haro MD     Date of Admission:  3/1/2017 (NORCO) 5-325 MG per tab 1 tablet 1 tablet Oral Q4H PRN   hydrocodone-acetaminophen (NORCO) 7.5-325 MG per tab 1 tablet 1 tablet Oral Q4H PRN   HYDROcodone-acetaminophen (NORCO)  MG per tab 1 tablet 1 tablet Oral Q4H PRN   morphINE sulfate (PF) 2 MG/ (SUBLIMAZE) 0.05 MG/ML injection 50 mcg 50 mcg Intravenous Q5 Min PRN   [DISCONTINUED] HYDROmorphone HCl PF (DILAUDID) 1 MG/ML injection 0.2 mg 0.2 mg Intravenous Q5 Min PRN   [DISCONTINUED] HYDROmorphone HCl PF (DILAUDID) 1 MG/ML injection 0.4 mg 0.4 mg I Intravenous PRN   [DISCONTINUED] glycopyrrolate (ROBINUL) 0.2 MG/ML injection   PRN       HISTORY:  Past Medical History:  Past Medical History   Diagnosis Date   • Essential hypertension       Surgical History:  History reviewed.  No pertinent past surgic Scattered atelectasis without evidence of acute intrathoracic process. 3. Hepatic steatosis. 4. Lesser incidental findings as above. ASSESSMENT AND PLAN:    1.  Left Hip fracture, s/p Intramedullary fixation of the 4-part intertrochanteric/s

## 2017-03-04 NOTE — PLAN OF CARE
DISCHARGE PLANNING    • Discharge to home or other facility with appropriate resources Not Progressing        Impaired Functional Mobility    • Achieve highest/safest level of mobility/gait Not Progressing          Vasal Vagal today while getting up with P

## 2017-03-05 LAB
ANION GAP SERPL CALC-SCNC: 5 MMOL/L (ref 0–18)
BASOPHILS # BLD: 0 K/UL (ref 0–0.2)
BASOPHILS # BLD: 0 K/UL (ref 0–0.2)
BASOPHILS NFR BLD: 1 %
BASOPHILS NFR BLD: 1 %
BUN SERPL-MCNC: 6 MG/DL (ref 8–20)
BUN/CREAT SERPL: 7.8 (ref 10–20)
CALCIUM SERPL-MCNC: 7.9 MG/DL (ref 8.5–10.5)
CHLORIDE SERPL-SCNC: 108 MMOL/L (ref 95–110)
CO2 SERPL-SCNC: 28 MMOL/L (ref 22–32)
CREAT SERPL-MCNC: 0.77 MG/DL (ref 0.5–1.5)
EOSINOPHIL # BLD: 0.1 K/UL (ref 0–0.7)
EOSINOPHIL # BLD: 0.2 K/UL (ref 0–0.7)
EOSINOPHIL NFR BLD: 2 %
EOSINOPHIL NFR BLD: 4 %
ERYTHROCYTE [DISTWIDTH] IN BLOOD BY AUTOMATED COUNT: 13.1 % (ref 11–15)
ERYTHROCYTE [DISTWIDTH] IN BLOOD BY AUTOMATED COUNT: 13.2 % (ref 11–15)
GLUCOSE SERPL-MCNC: 114 MG/DL (ref 70–99)
HCT VFR BLD AUTO: 21.7 % (ref 35–48)
HCT VFR BLD AUTO: 23.3 % (ref 35–48)
HGB BLD-MCNC: 7.3 G/DL (ref 12–16)
HGB BLD-MCNC: 7.8 G/DL (ref 12–16)
INR BLD: 3.3 (ref 0.9–1.2)
LYMPHOCYTES # BLD: 1.3 K/UL (ref 1–4)
LYMPHOCYTES # BLD: 1.5 K/UL (ref 1–4)
LYMPHOCYTES NFR BLD: 23 %
LYMPHOCYTES NFR BLD: 26 %
MCH RBC QN AUTO: 32 PG (ref 27–32)
MCH RBC QN AUTO: 32.1 PG (ref 27–32)
MCHC RBC AUTO-ENTMCNC: 33.5 G/DL (ref 32–37)
MCHC RBC AUTO-ENTMCNC: 33.6 G/DL (ref 32–37)
MCV RBC AUTO: 95.5 FL (ref 80–100)
MCV RBC AUTO: 95.5 FL (ref 80–100)
MONOCYTES # BLD: 0.4 K/UL (ref 0–1)
MONOCYTES # BLD: 0.5 K/UL (ref 0–1)
MONOCYTES NFR BLD: 10 %
MONOCYTES NFR BLD: 7 %
NEUTROPHILS # BLD AUTO: 3.1 K/UL (ref 1.8–7.7)
NEUTROPHILS # BLD AUTO: 4.5 K/UL (ref 1.8–7.7)
NEUTROPHILS NFR BLD: 60 %
NEUTROPHILS NFR BLD: 68 %
OSMOLALITY UR CALC.SUM OF ELEC: 290 MOSM/KG (ref 275–295)
PLATELET # BLD AUTO: 131 K/UL (ref 140–400)
PLATELET # BLD AUTO: 152 K/UL (ref 140–400)
PMV BLD AUTO: 6.7 FL (ref 7.4–10.3)
PMV BLD AUTO: 6.8 FL (ref 7.4–10.3)
POTASSIUM SERPL-SCNC: 3.5 MMOL/L (ref 3.3–5.1)
PROTHROMBIN TIME: 33.1 SECONDS (ref 11.8–14.5)
RBC # BLD AUTO: 2.27 M/UL (ref 3.7–5.4)
RBC # BLD AUTO: 2.44 M/UL (ref 3.7–5.4)
SODIUM SERPL-SCNC: 141 MMOL/L (ref 136–144)
WBC # BLD AUTO: 5.2 K/UL (ref 4–11)
WBC # BLD AUTO: 6.6 K/UL (ref 4–11)

## 2017-03-05 PROCEDURE — 97116 GAIT TRAINING THERAPY: CPT

## 2017-03-05 PROCEDURE — 80048 BASIC METABOLIC PNL TOTAL CA: CPT | Performed by: INTERNAL MEDICINE

## 2017-03-05 PROCEDURE — 85025 COMPLETE CBC W/AUTO DIFF WBC: CPT | Performed by: ORTHOPAEDIC SURGERY

## 2017-03-05 PROCEDURE — 97530 THERAPEUTIC ACTIVITIES: CPT

## 2017-03-05 PROCEDURE — 85610 PROTHROMBIN TIME: CPT | Performed by: INTERNAL MEDICINE

## 2017-03-05 RX ORDER — POTASSIUM CHLORIDE 20 MEQ/1
40 TABLET, EXTENDED RELEASE ORAL EVERY 4 HOURS
Status: COMPLETED | OUTPATIENT
Start: 2017-03-05 | End: 2017-03-05

## 2017-03-05 RX ORDER — WARFARIN SODIUM 2 MG/1
2 TABLET ORAL NIGHTLY
Status: DISCONTINUED | OUTPATIENT
Start: 2017-03-06 | End: 2017-03-06

## 2017-03-05 NOTE — PLAN OF CARE
DISCHARGE PLANNING    • Discharge to home or other facility with appropriate resources Progressing    TEX AND SPOUSE, CLEMENT, PLANS FOR PATIENT TO D/C TO [facility] ON 3/6/17      Impaired Functional Mobility    • Achieve highest/safest level of mobil

## 2017-03-05 NOTE — PROGRESS NOTES
[place] [facility] - [facility]    Progress Note    Van Child Patient Status:  Inpatient    1945 MRN [id number]   Location [facility] 4W/SW/SE Attending Angel Bernabe MD   Hosp Day # 4 PCP Gómez Villagomez MD     Date of Admission:  3/1/2017 Daily   HYDROcodone-acetaminophen (NORCO) 5-325 MG per tab 1 tablet 1 tablet Oral Q4H PRN   hydrocodone-acetaminophen (NORCO) 7.5-325 MG per tab 1 tablet 1 tablet Oral Q4H PRN   HYDROcodone-acetaminophen (NORCO)  MG per tab 1 tablet 1 tablet Oral Q4H [DISCONTINUED] fentaNYL citrate (SUBLIMAZE) 0.05 MG/ML injection 50 mcg 50 mcg Intravenous Q5 Min PRN   [DISCONTINUED] HYDROmorphone HCl PF (DILAUDID) 1 MG/ML injection 0.2 mg 0.2 mg Intravenous Q5 Min PRN   [DISCONTINUED] HYDROmorphone HCl PF (DILAUDID) methylsulfate (BLOXIVERZ) injection  Intravenous PRN   [DISCONTINUED] glycopyrrolate (ROBINUL) 0.2 MG/ML injection   PRN       HISTORY:  Past Medical History:  Past Medical History   Diagnosis Date   • Essential hypertension       Surgical History:  Histor occupational therapy as ordered. Continue anticoagulation as ordered as well. Close monitoring of hemoglobin/hematocrit on anticoagulation with, again, a repeat CBC ordered for 1500.   She presently denies any dizziness and there is no evidence of any tac

## 2017-03-05 NOTE — PROGRESS NOTES
[facility]    Progress Note  [facility]    Keysha Tavares Patient Status:  Inpatient    1945 MRN [id number]   Location [facility] 4W/SW/SE Attending Ferny Schafer MD   Hosp Day # 4 PCP Vilma Roberson MD       As (ADULT) tab 1 tablet, 1 tablet, Oral, Daily  •  lactated ringers infusion, , Intravenous, Continuous    Objective:   Blood pressure 136/64, pulse 86, temperature 98.4 °F (36.9 °C), temperature source Oral, resp.  rate 18, height 5' 4\" (1.626 m), weight 143

## 2017-03-05 NOTE — PHYSICAL THERAPY NOTE
PHYSICAL THERAPY HIP TREATMENT NOTE - INPATIENT    Room Number: 713/046-G       Number of Visits to Meet Established Goals: 10    Presenting Problem: L hip fracture    Problem List  Principal Problem:    Closed left hip fracture, initial encounter ([facility]  A postioned for comfort  --emphasis on resting neutral left LE postion    Pt with decrese left  LE AROM and str ----decrease activity tolerance ---decrease balance  Increased fall risk  Pt is non ambulatory at this time --pt able to maintain NON WB status on the side of the bed?: Unable   How much help from another person does the patient currently need. ..   -   Moving to and from a bed to a chair (including a wheelchair)?: Total   -   Need to walk in hospital room?: Total   -   Climbing 3-5 steps with a raili EOB @ level: minimum assistance    Current:  2 assist used at min to mod level           Goal #2    Patient is able to demonstrate transfers Sit to/from Stand at assistance level: moderate assistance    Current:  Mod Assist x 2   Assist for left LE to maint

## 2017-03-06 ENCOUNTER — PRIOR ORIGINAL RECORDS (OUTPATIENT)
Dept: OTHER | Age: 72
End: 2017-03-06

## 2017-03-06 VITALS
OXYGEN SATURATION: 96 % | SYSTOLIC BLOOD PRESSURE: 129 MMHG | HEIGHT: 64 IN | RESPIRATION RATE: 18 BRPM | DIASTOLIC BLOOD PRESSURE: 58 MMHG | HEART RATE: 103 BPM | BODY MASS INDEX: 24.41 KG/M2 | TEMPERATURE: 99 F | WEIGHT: 143 LBS

## 2017-03-06 LAB
ANION GAP SERPL CALC-SCNC: 5 MMOL/L (ref 0–18)
BASOPHILS # BLD: 0 K/UL (ref 0–0.2)
BASOPHILS NFR BLD: 1 %
BUN SERPL-MCNC: 6 MG/DL (ref 8–20)
BUN/CREAT SERPL: 8.3 (ref 10–20)
CALCIUM SERPL-MCNC: 8.4 MG/DL (ref 8.5–10.5)
CHLORIDE SERPL-SCNC: 107 MMOL/L (ref 95–110)
CO2 SERPL-SCNC: 28 MMOL/L (ref 22–32)
CREAT SERPL-MCNC: 0.72 MG/DL (ref 0.5–1.5)
EOSINOPHIL # BLD: 0.2 K/UL (ref 0–0.7)
EOSINOPHIL NFR BLD: 3 %
ERYTHROCYTE [DISTWIDTH] IN BLOOD BY AUTOMATED COUNT: 13 % (ref 11–15)
GLUCOSE SERPL-MCNC: 113 MG/DL (ref 70–99)
HCT VFR BLD AUTO: 22.3 % (ref 35–48)
HGB BLD-MCNC: 7.5 G/DL (ref 12–16)
INR BLD: 2.7 (ref 0.9–1.2)
LYMPHOCYTES # BLD: 1.2 K/UL (ref 1–4)
LYMPHOCYTES NFR BLD: 21 %
MCH RBC QN AUTO: 31.9 PG (ref 27–32)
MCHC RBC AUTO-ENTMCNC: 33.6 G/DL (ref 32–37)
MCV RBC AUTO: 95 FL (ref 80–100)
MONOCYTES # BLD: 0.5 K/UL (ref 0–1)
MONOCYTES NFR BLD: 8 %
NEUTROPHILS # BLD AUTO: 3.9 K/UL (ref 1.8–7.7)
NEUTROPHILS NFR BLD: 67 %
OSMOLALITY UR CALC.SUM OF ELEC: 288 MOSM/KG (ref 275–295)
PLATELET # BLD AUTO: 155 K/UL (ref 140–400)
PMV BLD AUTO: 6.6 FL (ref 7.4–10.3)
POTASSIUM SERPL-SCNC: 4.3 MMOL/L (ref 3.3–5.1)
POTASSIUM SERPL-SCNC: 4.3 MMOL/L (ref 3.3–5.1)
PROTHROMBIN TIME: 28.6 SECONDS (ref 11.8–14.5)
RBC # BLD AUTO: 2.35 M/UL (ref 3.7–5.4)
SODIUM SERPL-SCNC: 140 MMOL/L (ref 136–144)
WBC # BLD AUTO: 5.8 K/UL (ref 4–11)

## 2017-03-06 PROCEDURE — 80048 BASIC METABOLIC PNL TOTAL CA: CPT | Performed by: INTERNAL MEDICINE

## 2017-03-06 PROCEDURE — 84132 ASSAY OF SERUM POTASSIUM: CPT | Performed by: INTERNAL MEDICINE

## 2017-03-06 PROCEDURE — 85610 PROTHROMBIN TIME: CPT | Performed by: INTERNAL MEDICINE

## 2017-03-06 PROCEDURE — 97116 GAIT TRAINING THERAPY: CPT

## 2017-03-06 PROCEDURE — 97110 THERAPEUTIC EXERCISES: CPT

## 2017-03-06 PROCEDURE — 85025 COMPLETE CBC W/AUTO DIFF WBC: CPT | Performed by: INTERNAL MEDICINE

## 2017-03-06 RX ORDER — HYDROCODONE BITARTRATE AND ACETAMINOPHEN 5; 325 MG/1; MG/1
1 TABLET ORAL EVERY 4 HOURS PRN
Qty: 40 TABLET | Refills: 0 | Status: SHIPPED | OUTPATIENT
Start: 2017-03-06 | End: 2017-03-16

## 2017-03-06 RX ORDER — MELATONIN
325
Qty: 30 TABLET | Refills: 0 | Status: SHIPPED | OUTPATIENT
Start: 2017-03-06

## 2017-03-06 RX ORDER — WARFARIN SODIUM 2 MG/1
2 TABLET ORAL NIGHTLY
Qty: 30 TABLET | Refills: 0 | Status: ON HOLD | OUTPATIENT
Start: 2017-03-06 | End: 2017-07-21

## 2017-03-06 RX ORDER — MELATONIN
325
Status: DISCONTINUED | OUTPATIENT
Start: 2017-03-06 | End: 2017-03-06

## 2017-03-06 NOTE — PLAN OF CARE
DISCHARGE PLANNING    • Discharge to home or other facility with appropriate resources Progressing    [facility]      Impaired Functional Mobility    • Achieve highest/safest level of mobility/gait Progressing

## 2017-03-06 NOTE — PHYSICAL THERAPY NOTE
PHYSICAL THERAPY TREATMENT NOTE - INPATIENT    Room Number: 684/835-F       Presenting Problem: L hip fracture    Problem List  Principal Problem:    Closed left hip fracture, initial encounter [facility])  Active Problems:    Closed left hip fracture ([address] need...   -   Moving to and from a bed to a chair (including a wheelchair)?: A Lot   -   Need to walk in hospital room?: A Lot   -   Climbing 3-5 steps with a railing?: Total    AM-PAC Score:  Raw Score: 11   PT Approx Degree of Impairment Score: 72.57%

## 2017-03-06 NOTE — PROGRESS NOTES
ORTHO SURG: PO#5  Tmax = 98.9. AM LABS: INR = 2.7, WBC = 5,800, H/H = 7.5 / 22.3. PAIN IS CONTROLLED. Hgb  HAS FLUCTUATED BETWEEN 7.3 - 8.0 THROUGH THE WEEKEND. PATIENT HAS NOT BEEN TRANSFUSED SINCE SURGERY.  PE: UP IN RECLINER, ALERT, NAD, LEFT HIP AND

## 2017-03-06 NOTE — PROGRESS NOTES
[facility]    Progress Note  [facility]    Aron Min Patient Status:  Inpatient    1945 MRN [id number]   Location [facility] 4W/SW/SE Attending Amos Paz MD   Hosp Day # 5 PCP Nafisa Thompson MD       As (DRISDOL/VITAMIN D2) cap 50,000 Units, 50,000 Units, Oral, Q7 Days  •  multivitamin (ADULT) tab 1 tablet, 1 tablet, Oral, Daily  •  lactated ringers infusion, , Intravenous, Continuous    Objective:   Blood pressure 129/58, pulse 103, temperature 98.5 °F (

## 2017-03-24 ENCOUNTER — TELEPHONE (OUTPATIENT)
Dept: INTERNAL MEDICINE CLINIC | Facility: CLINIC | Age: 72
End: 2017-03-24

## 2017-04-11 ENCOUNTER — HOSPITAL ENCOUNTER (OUTPATIENT)
Dept: GENERAL RADIOLOGY | Age: 72
Discharge: HOME | End: 2017-04-11
Attending: ORTHOPAEDIC SURGERY
Payer: MEDICARE

## 2017-04-11 DIAGNOSIS — M25.552 LEFT HIP PAIN: ICD-10-CM

## 2017-04-11 PROCEDURE — 73502 X-RAY EXAM HIP UNI 2-3 VIEWS: CPT

## 2017-04-19 ENCOUNTER — PRIOR ORIGINAL RECORDS (OUTPATIENT)
Dept: OTHER | Age: 72
End: 2017-04-19

## 2017-04-19 ENCOUNTER — LAB ENCOUNTER (OUTPATIENT)
Dept: LAB | Facility: HOSPITAL | Age: 72
End: 2017-04-19
Attending: INTERNAL MEDICINE
Payer: MEDICARE

## 2017-04-19 DIAGNOSIS — E55.9 AVITAMINOSIS D: ICD-10-CM

## 2017-04-19 DIAGNOSIS — E78.2 MIXED HYPERLIPIDEMIA: ICD-10-CM

## 2017-04-19 DIAGNOSIS — I51.9 MYXEDEMA HEART DISEASE: ICD-10-CM

## 2017-04-19 DIAGNOSIS — E03.9 MYXEDEMA HEART DISEASE: ICD-10-CM

## 2017-04-19 DIAGNOSIS — E53.8 VITAMIN B 12 DEFICIENCY: ICD-10-CM

## 2017-04-19 DIAGNOSIS — D50.8 IRON DEFICIENCY ANEMIA SECONDARY TO INADEQUATE DIETARY IRON INTAKE: ICD-10-CM

## 2017-04-19 DIAGNOSIS — E11.9 DIABETES MELLITUS (HCC): Primary | ICD-10-CM

## 2017-04-19 PROCEDURE — 84439 ASSAY OF FREE THYROXINE: CPT

## 2017-04-19 PROCEDURE — 85025 COMPLETE CBC W/AUTO DIFF WBC: CPT

## 2017-04-19 PROCEDURE — 82607 VITAMIN B-12: CPT

## 2017-04-19 PROCEDURE — 84443 ASSAY THYROID STIM HORMONE: CPT

## 2017-04-19 PROCEDURE — 83540 ASSAY OF IRON: CPT

## 2017-04-19 PROCEDURE — 36415 COLL VENOUS BLD VENIPUNCTURE: CPT

## 2017-04-19 PROCEDURE — 84466 ASSAY OF TRANSFERRIN: CPT

## 2017-04-19 PROCEDURE — 80053 COMPREHEN METABOLIC PANEL: CPT

## 2017-04-19 PROCEDURE — 80061 LIPID PANEL: CPT

## 2017-05-04 NOTE — DISCHARGE SUMMARY
[facility]    PATIENT'S NAME: MARYTEX CARMEN   ATTENDING PHYSICIAN: Jefferson Lazcano MD   PATIENT ACCOUNT#:   [id number]    LOCATION:  [address] [place] [address] RECORD #:   [id number]       YOB: 1945  ADMISSION DATE:       03/01/2017

## 2017-05-23 ENCOUNTER — HOSPITAL ENCOUNTER (OUTPATIENT)
Dept: GENERAL RADIOLOGY | Age: 72
Discharge: HOME | End: 2017-05-23
Attending: ORTHOPAEDIC SURGERY
Payer: MEDICARE

## 2017-05-23 DIAGNOSIS — M89.8X5 PAIN OF LEFT FEMUR: ICD-10-CM

## 2017-05-23 PROCEDURE — 73552 X-RAY EXAM OF FEMUR 2/>: CPT | Performed by: ORTHOPAEDIC SURGERY

## 2017-06-08 ENCOUNTER — HOSPITAL ENCOUNTER (OUTPATIENT)
Dept: CT IMAGING | Facility: HOSPITAL | Age: 72
Discharge: HOME | End: 2017-06-08
Attending: INTERNAL MEDICINE
Payer: MEDICARE

## 2017-06-08 DIAGNOSIS — R93.89 ABNORMAL CT SCAN, CHEST: ICD-10-CM

## 2017-06-08 PROCEDURE — 71260 CT THORAX DX C+: CPT | Performed by: INTERNAL MEDICINE

## 2017-06-08 PROCEDURE — 82565 ASSAY OF CREATININE: CPT

## 2017-07-21 ENCOUNTER — HOSPITAL ENCOUNTER (OUTPATIENT)
Facility: HOSPITAL | Age: 72
Setting detail: HOSPITAL OUTPATIENT SURGERY
Discharge: HOME | End: 2017-07-21
Attending: SURGERY | Admitting: SURGERY
Payer: MEDICARE

## 2017-07-21 ENCOUNTER — SURGERY (OUTPATIENT)
Age: 72
End: 2017-07-21

## 2017-07-21 DIAGNOSIS — K21.9 GASTROESOPHAGEAL REFLUX DISEASE: ICD-10-CM

## 2017-07-21 DIAGNOSIS — Z86.010 HISTORY OF COLONIC POLYPS: ICD-10-CM

## 2017-07-21 PROBLEM — Z12.11 ENCOUNTER FOR COLONOSCOPY DUE TO HISTORY OF COLONIC POLYP: Status: ACTIVE | Noted: 2017-07-21

## 2017-07-21 PROBLEM — Z86.0100 ENCOUNTER FOR COLONOSCOPY DUE TO HISTORY OF COLONIC POLYP: Status: ACTIVE | Noted: 2017-07-21

## 2017-07-21 PROCEDURE — 0DB68ZX EXCISION OF STOMACH, VIA NATURAL OR ARTIFICIAL OPENING ENDOSCOPIC, DIAGNOSTIC: ICD-10-PCS | Performed by: SURGERY

## 2017-07-21 PROCEDURE — 88305 TISSUE EXAM BY PATHOLOGIST: CPT | Performed by: SURGERY

## 2017-07-21 PROCEDURE — 0DBP8ZX EXCISION OF RECTUM, VIA NATURAL OR ARTIFICIAL OPENING ENDOSCOPIC, DIAGNOSTIC: ICD-10-PCS | Performed by: SURGERY

## 2017-07-21 PROCEDURE — 0DBH8ZX EXCISION OF CECUM, VIA NATURAL OR ARTIFICIAL OPENING ENDOSCOPIC, DIAGNOSTIC: ICD-10-PCS | Performed by: SURGERY

## 2017-07-21 PROCEDURE — 87081 CULTURE SCREEN ONLY: CPT | Performed by: SURGERY

## 2017-07-21 PROCEDURE — 88312 SPECIAL STAINS GROUP 1: CPT | Performed by: SURGERY

## 2017-07-21 PROCEDURE — 0DB78ZX EXCISION OF STOMACH, PYLORUS, VIA NATURAL OR ARTIFICIAL OPENING ENDOSCOPIC, DIAGNOSTIC: ICD-10-PCS | Performed by: SURGERY

## 2017-07-21 RX ORDER — MIDAZOLAM HYDROCHLORIDE 1 MG/ML
INJECTION INTRAMUSCULAR; INTRAVENOUS
Status: DISCONTINUED | OUTPATIENT
Start: 2017-07-21 | End: 2017-07-21

## 2017-07-21 NOTE — BRIEF OP NOTE
[facility]    Brief Op Note    Yanet Miller Patient Status:  Hospital Outpatient Surgery    1945 MRN [id number]   Location [facility] ENDOSCOPY LAB SUITES Attending Silverio Bynum MD   Hosp Day # 0 PCP Thais Salgado MD

## 2017-07-21 NOTE — H&P
History & Physical Examination    Patient Name: Jeannie Thompson  MRN: [id number]  CSN: [id number]  YOB: 1945    Diagnosis: Gastroesophageal reflux disease without esophagitis. History of colon polyp.   Present Illness: history of colon polyp for alejo Redd Barrios [ X ] [ X ]    HEART [ X ] [ X ]    LUNGS [ X ] [ X ]    Starr Coats [ X ] [ X ]    Elaina Yeung [ X ] [ X ]    EXTREMITIES [ X ] [ X ]    OTHER        [ x ] I have discussed the risks and benefits and alternatives with the patient/family.   They u

## 2017-07-22 VITALS
HEART RATE: 55 BPM | RESPIRATION RATE: 19 BRPM | BODY MASS INDEX: 24.16 KG/M2 | DIASTOLIC BLOOD PRESSURE: 59 MMHG | SYSTOLIC BLOOD PRESSURE: 124 MMHG | OXYGEN SATURATION: 98 % | HEIGHT: 65 IN | WEIGHT: 145 LBS

## 2017-07-22 LAB — CLO TEST: NEGATIVE

## 2017-07-24 NOTE — OPERATIVE REPORT
[facility]    PATIENT'S NAME: TEX HERNANDEZ   ATTENDING PHYSICIAN: Hiram Betancourt MD   OPERATING PHYSICIAN: Hiram Betancourt MD   PATIENT ACCOUNT#:   [id number]    LOCATION:  [facility] ROOM 10 [facility] 10  MEDICAL RECORD #:   [id number]       DATE OF polyps. I believe it is hyperplastic polyp. This was removed with biopsy forceps. Scope was then continued to be advanced through the fairly normal sigmoid colon, descending colon, transverse colon, and all the way to right colon. Entered the cecum. and looking at the fundus and cardia, this is normal.  Scope was then slowly withdrawn back to the larynx. The vocal cords were inspected which were normal.  Scope was then removed completely. Patient tolerated the procedure well.      DISCUSSION:  For madie

## 2017-10-09 LAB
ALBUMIN: 3.8 G/DL
ALT (SGPT): 18 U/L
AST (SGOT): 25 U/L
BILIRUBIN TOTAL: 0.7 MG/DL
BILIRUBIN TOTAL: 0.7 MG/DL
BUN: 6 MG/DL
BUN: 9 MG/DL
CALCIUM: 8.4 MG/DL
CALCIUM: 9.6 MG/DL
CHLORIDE: 104 MEQ/L
CHLORIDE: 107 MEQ/L
CHOLESTEROL, TOTAL: 227 MG/DL
CREATININE, SERUM: 0.72 MG/DL
CREATININE, SERUM: 0.85 MG/DL
GLOBULIN: 3.4 G/DL
GLUCOSE: 110 MG/DL
GLUCOSE: 110 MG/DL
GLUCOSE: 113 MG/DL
HDL CHOLESTEROL: 54 MG/DL
HEMATOCRIT: 22.3 %
HEMATOCRIT: 37.8 %
HEMOGLOBIN: 12.7 G/DL
HEMOGLOBIN: 7.5 G/DL
LDL CHOLESTEROL: 145 MG/DL
MCH: 31.9 PG
MCH: 32.2 PG
MCHC: 33.5 G/DL
MCHC: 33.6 G/DL
MCV: 95 FL
MCV: 96 FL
NON-HDL CHOLESTEROL: 173 MG/DL
PLATELETS: 155 K/UL
PLATELETS: 244 K/UL
POTASSIUM, SERUM: 4.3 MEQ/L
POTASSIUM, SERUM: 4.5 MEQ/L
PROTEIN, TOTAL: 7.2 G/DL
RED BLOOD COUNT: 2.35 X 10-6/U
RED BLOOD COUNT: 3.94 X 10-6/U
SGOT (AST): 25 IU/L
SGPT (ALT): 18 IU/L
SODIUM: 140 MEQ/L
SODIUM: 140 MEQ/L
TRIGLYCERIDES: 142 MG/DL
WHITE BLOOD COUNT: 5 X 10-3/U
WHITE BLOOD COUNT: 5.8 X 10-3/U

## 2017-10-10 ENCOUNTER — PRIOR ORIGINAL RECORDS (OUTPATIENT)
Dept: OTHER | Age: 72
End: 2017-10-10

## 2017-10-19 ENCOUNTER — LAB ENCOUNTER (OUTPATIENT)
Dept: LAB | Facility: HOSPITAL | Age: 72
End: 2017-10-19
Attending: INTERNAL MEDICINE
Payer: MEDICARE

## 2017-10-19 ENCOUNTER — PRIOR ORIGINAL RECORDS (OUTPATIENT)
Dept: OTHER | Age: 72
End: 2017-10-19

## 2017-10-19 DIAGNOSIS — E78.2 MIXED HYPERLIPIDEMIA: Primary | ICD-10-CM

## 2017-10-19 DIAGNOSIS — E55.9 VITAMIN D DEFICIENCY: ICD-10-CM

## 2017-10-19 DIAGNOSIS — D50.8 IRON DEFICIENCY ANEMIA SECONDARY TO INADEQUATE DIETARY IRON INTAKE: ICD-10-CM

## 2017-10-19 PROCEDURE — 80061 LIPID PANEL: CPT

## 2017-10-19 PROCEDURE — 82306 VITAMIN D 25 HYDROXY: CPT

## 2017-10-19 PROCEDURE — 85025 COMPLETE CBC W/AUTO DIFF WBC: CPT

## 2017-10-19 PROCEDURE — 36415 COLL VENOUS BLD VENIPUNCTURE: CPT

## 2017-10-19 PROCEDURE — 80053 COMPREHEN METABOLIC PANEL: CPT

## 2017-10-26 ENCOUNTER — HOSPITAL (OUTPATIENT)
Dept: OTHER | Age: 72
End: 2017-10-26
Attending: OBSTETRICS & GYNECOLOGY

## 2017-10-27 ENCOUNTER — HOSPITAL (OUTPATIENT)
Dept: OTHER | Age: 72
End: 2017-10-27
Attending: OBSTETRICS & GYNECOLOGY

## 2017-12-07 ENCOUNTER — HOSPITAL ENCOUNTER (OUTPATIENT)
Dept: CT IMAGING | Facility: HOSPITAL | Age: 72
Discharge: HOME | End: 2017-12-07
Attending: INTERNAL MEDICINE
Payer: MEDICARE

## 2017-12-07 DIAGNOSIS — R91.1 NODULE OF RIGHT LUNG: ICD-10-CM

## 2017-12-07 PROCEDURE — 82565 ASSAY OF CREATININE: CPT

## 2017-12-07 PROCEDURE — 71260 CT THORAX DX C+: CPT | Performed by: INTERNAL MEDICINE

## 2018-04-05 ENCOUNTER — PRIOR ORIGINAL RECORDS (OUTPATIENT)
Dept: OTHER | Age: 73
End: 2018-04-05

## 2018-04-05 ENCOUNTER — LAB ENCOUNTER (OUTPATIENT)
Dept: LAB | Facility: HOSPITAL | Age: 73
End: 2018-04-05
Attending: INTERNAL MEDICINE
Payer: MEDICARE

## 2018-04-05 DIAGNOSIS — E78.2 MIXED HYPERLIPIDEMIA: ICD-10-CM

## 2018-04-05 DIAGNOSIS — E03.9 MYXEDEMA HEART DISEASE: ICD-10-CM

## 2018-04-05 DIAGNOSIS — I51.9 MYXEDEMA HEART DISEASE: ICD-10-CM

## 2018-04-05 DIAGNOSIS — D50.8 IRON DEFICIENCY ANEMIA SECONDARY TO INADEQUATE DIETARY IRON INTAKE: ICD-10-CM

## 2018-04-05 DIAGNOSIS — E55.9 VITAMIN D DEFICIENCY: ICD-10-CM

## 2018-04-05 DIAGNOSIS — E11.9 DIABETES MELLITUS (HCC): Primary | ICD-10-CM

## 2018-04-05 PROCEDURE — 84439 ASSAY OF FREE THYROXINE: CPT

## 2018-04-05 PROCEDURE — 82607 VITAMIN B-12: CPT

## 2018-04-05 PROCEDURE — 84443 ASSAY THYROID STIM HORMONE: CPT

## 2018-04-05 PROCEDURE — 36415 COLL VENOUS BLD VENIPUNCTURE: CPT

## 2018-04-05 PROCEDURE — 80053 COMPREHEN METABOLIC PANEL: CPT

## 2018-04-05 PROCEDURE — 83036 HEMOGLOBIN GLYCOSYLATED A1C: CPT

## 2018-04-05 PROCEDURE — 80061 LIPID PANEL: CPT

## 2018-04-23 ENCOUNTER — OFFICE VISIT (OUTPATIENT)
Dept: OTOLARYNGOLOGY | Facility: CLINIC | Age: 73
End: 2018-04-23

## 2018-04-23 VITALS
HEIGHT: 65 IN | WEIGHT: 140 LBS | SYSTOLIC BLOOD PRESSURE: 130 MMHG | BODY MASS INDEX: 23.32 KG/M2 | TEMPERATURE: 97 F | DIASTOLIC BLOOD PRESSURE: 65 MMHG

## 2018-04-23 DIAGNOSIS — S02.400A: Primary | ICD-10-CM

## 2018-04-23 LAB
ALT (SGPT): 17 U/L
AST (SGOT): 27 U/L
BILIRUBIN TOTAL: 1.1 MG/DL
BUN: 8 MG/DL
CALCIUM: 9.6 MG/DL
CHLORIDE: 98 MEQ/L
CHOLESTEROL, TOTAL: 238 MG/DL
CREATININE, SERUM: 0.86 MG/DL
GLUCOSE: 125 MG/DL
GLUCOSE: 125 MG/DL
HDL CHOLESTEROL: 65 MG/DL
HEMATOCRIT: 41.3 %
HEMOGLOBIN: 14.1 G/DL
LDL CHOLESTEROL: 149 MG/DL
NON-HDL CHOLESTEROL: 173 MG/DL
PLATELETS: 204 K/UL
POTASSIUM, SERUM: 4.5 MEQ/L
PROTEIN, TOTAL: 7.6 G/DL
RED BLOOD COUNT: 4.3 X 10-6/U
SGOT (AST): 27 IU/L
SGPT (ALT): 17 IU/L
SODIUM: 134 MEQ/L
TRIGLYCERIDES: 120 MG/DL
WHITE BLOOD COUNT: 4.9 X 10-3/U

## 2018-04-23 PROCEDURE — G0463 HOSPITAL OUTPT CLINIC VISIT: HCPCS | Performed by: OTOLARYNGOLOGY

## 2018-04-23 PROCEDURE — 99203 OFFICE O/P NEW LOW 30 MIN: CPT | Performed by: OTOLARYNGOLOGY

## 2018-04-23 RX ORDER — METOPROLOL SUCCINATE 50 MG/1
50 TABLET, EXTENDED RELEASE ORAL
COMMUNITY
End: 2018-05-22 | Stop reason: ALTCHOICE

## 2018-04-23 RX ORDER — LOSARTAN POTASSIUM 25 MG/1
1 TABLET ORAL DAILY
Status: ON HOLD | COMMUNITY
End: 2019-03-20

## 2018-04-24 ENCOUNTER — PRIOR ORIGINAL RECORDS (OUTPATIENT)
Dept: OTHER | Age: 73
End: 2018-04-24

## 2018-04-24 NOTE — PROGRESS NOTES
Tono Martin is a 72year old female. Patient presents with:  Nose Problem: nasal injury happened on april 12,2018    HPI:   11 days ago she had an episode of possible syncope. She fell onto her face. Sustained an injury to her facial bones.   The report of exertion  NEURO: denies headaches    EXAM:   /65   Temp (!) 97.3 °F (36.3 °C) (Tympanic)   Ht 5' 5\" (1.651 m)   Wt 140 lb (63.5 kg)   BMI 23.30 kg/m²   System Findings Details   Skin Normal Inspection - Normal.   Constitutional Normal Overall appear AM

## 2018-05-03 ENCOUNTER — HOSPITAL ENCOUNTER (OUTPATIENT)
Dept: CT IMAGING | Facility: HOSPITAL | Age: 73
Discharge: HOME | End: 2018-05-03
Attending: OTOLARYNGOLOGY
Payer: MEDICARE

## 2018-05-03 DIAGNOSIS — S02.400A: ICD-10-CM

## 2018-05-03 PROCEDURE — 70486 CT MAXILLOFACIAL W/O DYE: CPT | Performed by: OTOLARYNGOLOGY

## 2018-05-04 ENCOUNTER — TELEPHONE (OUTPATIENT)
Dept: OTOLARYNGOLOGY | Facility: CLINIC | Age: 73
End: 2018-05-04

## 2018-05-07 ENCOUNTER — HOSPITAL ENCOUNTER (OUTPATIENT)
Dept: MRI IMAGING | Facility: HOSPITAL | Age: 73
Discharge: HOME | End: 2018-05-07
Attending: INTERNAL MEDICINE
Payer: MEDICARE

## 2018-05-07 DIAGNOSIS — M54.30 SCIATICA: ICD-10-CM

## 2018-05-07 DIAGNOSIS — Z90.79 S/P ORCHIECTOMY: ICD-10-CM

## 2018-05-07 PROCEDURE — 72148 MRI LUMBAR SPINE W/O DYE: CPT | Performed by: INTERNAL MEDICINE

## 2018-05-15 ENCOUNTER — PRIOR ORIGINAL RECORDS (OUTPATIENT)
Dept: OTHER | Age: 73
End: 2018-05-15

## 2018-05-22 ENCOUNTER — OFFICE VISIT (OUTPATIENT)
Dept: NEUROLOGY | Facility: CLINIC | Age: 73
End: 2018-05-22

## 2018-05-22 ENCOUNTER — TELEPHONE (OUTPATIENT)
Dept: NEUROLOGY | Facility: CLINIC | Age: 73
End: 2018-05-22

## 2018-05-22 VITALS
BODY MASS INDEX: 23.16 KG/M2 | HEIGHT: 65 IN | DIASTOLIC BLOOD PRESSURE: 58 MMHG | WEIGHT: 139 LBS | SYSTOLIC BLOOD PRESSURE: 130 MMHG | HEART RATE: 78 BPM

## 2018-05-22 DIAGNOSIS — R55 SYNCOPE, UNSPECIFIED SYNCOPE TYPE: Primary | ICD-10-CM

## 2018-05-22 DIAGNOSIS — I67.1 CEREBRAL ANEURYSM: ICD-10-CM

## 2018-05-22 DIAGNOSIS — S06.6X1S SUBARACHNOID HEMATOMA, WITH LOSS OF CONSCIOUSNESS OF 30 MINUTES OR LESS, SEQUELA (HCC): ICD-10-CM

## 2018-05-22 DIAGNOSIS — I67.9 CEREBROVASCULAR DISEASE: ICD-10-CM

## 2018-05-22 PROCEDURE — 99204 OFFICE O/P NEW MOD 45 MIN: CPT | Performed by: OTHER

## 2018-05-22 NOTE — TELEPHONE ENCOUNTER
Called patient to advise insurance was verified and MRA Brain is a covered benefit and does not require authorization, patient stated that she was told to have it in 3 Months, spoke with Dr Sneha Coffey to confirm, thansferred patient to scheduling, patient karli

## 2018-05-23 ENCOUNTER — TELEPHONE (OUTPATIENT)
Dept: NEUROLOGY | Facility: CLINIC | Age: 73
End: 2018-05-23

## 2018-05-23 NOTE — TELEPHONE ENCOUNTER
If we can please call patient/, advise I recommend aspirin 81 mg per day for stroke preventative measures.

## 2018-05-23 NOTE — TELEPHONE ENCOUNTER
Spoke to patient and advised to take aspirin 81 mg daily. Patient wanted to confirm can take same time as other medications. Patient advised can take morning or evening. Expressed understanding.

## 2018-05-23 NOTE — PROGRESS NOTES
Neurology Outpatient Consult Note    Allen Reeves : 1945   Referring Physician: Dr. Jeanine Estarda  HPI:     Allen Reeves is a 72year old female who is being seen in neurologic evaluation.     Patient is being seen in evaluation for syncopal event, inciden is currently having a loop recorder per cardiology. She has had episodes of vertigo a few times, as did her mother. No syncopal events in the past.  Does have chronic anemia as well as prior episodes of low blood pressure.     Seizure risk factors: no fam Social History Main Topics    Smoking status: Never Smoker                                                                Smokeless tobacco: Never Used                        Alcohol use:  No              Drug use: No            Other Topics FRACTURE OF THE LATERAL WALL AND  A SMALL AMOUNT OF LAYERING HEMORRHAGE IN THE SINUS. 2. NONDISPLACED FRACTURE OF THE LEFT ORBITAL FLOOR WITH MINIMALLY  DEPRESSED FRACTURE THROUGH THE LATERAL WALL. NO FAT HERNIATION THROUGH  THE FLOOR.   3. 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

## 2018-06-07 ENCOUNTER — OFFICE VISIT (OUTPATIENT)
Dept: SURGERY | Facility: CLINIC | Age: 73
End: 2018-06-07

## 2018-06-07 VITALS — SYSTOLIC BLOOD PRESSURE: 140 MMHG | HEART RATE: 72 BPM | DIASTOLIC BLOOD PRESSURE: 72 MMHG

## 2018-06-07 DIAGNOSIS — I67.1 INTRACRANIAL ANEURYSM: ICD-10-CM

## 2018-06-07 DIAGNOSIS — I67.2 INTRACRANIAL ATHEROSCLEROSIS: Primary | ICD-10-CM

## 2018-06-07 PROCEDURE — 99205 OFFICE O/P NEW HI 60 MIN: CPT

## 2018-06-07 RX ORDER — ASPIRIN 325 MG
325 TABLET ORAL DAILY
Status: DISCONTINUED | OUTPATIENT
Start: 2018-06-07 | End: 2019-03-20

## 2018-06-07 NOTE — H&P
[facility]  Interventional Neuroradiology Clinic Note      Yaniv White MD  Neurology  8 Marzena Gray MD  Neurosurgery  [address], [place]  Primary Care        Date of Service: 6/7/2018    Dear Marcia Mccall impairment and resolved vertigo  The patient denies upper/lower extremity weakness or unilateral paresthesias. The patient denies dizziness, imbalance, difficulty with ambulation, coordination, dysarthria, or speech expression/comprehension.  +mild occasio cooperative throughout the examination. HEENT/NEURO:  The patient's head is normocephalic with anicteric sclerae, moist mucous membranes, and nasal cavities. There is no evidence of carotid bruits, lymphadenopathy, or masses. The thyroid is midline.   Amy Chacon risk of intracranial aneurysm rupture and subarachnoid hemorrhage complications at ~ 8.3% annually for posterior circulation aneurysms measuring < 7 mm based on the prospective ISUIA and UCAS Filipino studies.  Due to the small size of the sidewall basilar the patient towards risk factor management and lifestyle modification and will recommend aspirin 325 mg antiplatelet therapy.   We noted that the patient should continue on antihypertensive therapy with regular monitoring as per her primary care and stroke

## 2018-06-07 NOTE — PROGRESS NOTES
Review of Systems:    Hand Dominance: right  General: no symptoms reported  Neuro: no symptoms reported  Head: no symptoms reported  Musculoskeletal: no symptoms reported  Cardiovascular: syncope  Gastrointestinal: no symptoms reported  Genitourinary: no s

## 2018-06-18 ENCOUNTER — HOSPITAL ENCOUNTER (OUTPATIENT)
Dept: INTERVENTIONAL RADIOLOGY/VASCULAR | Facility: HOSPITAL | Age: 73
Discharge: HOME | End: 2018-06-18
Attending: INTERNAL MEDICINE | Admitting: INTERNAL MEDICINE
Payer: MEDICARE

## 2018-06-18 VITALS
SYSTOLIC BLOOD PRESSURE: 125 MMHG | WEIGHT: 142 LBS | RESPIRATION RATE: 16 BRPM | HEART RATE: 69 BPM | HEIGHT: 65 IN | OXYGEN SATURATION: 97 % | DIASTOLIC BLOOD PRESSURE: 63 MMHG | BODY MASS INDEX: 23.66 KG/M2

## 2018-06-18 DIAGNOSIS — R00.2 PALPITATIONS: ICD-10-CM

## 2018-06-18 DIAGNOSIS — R55 SYNCOPE: ICD-10-CM

## 2018-06-18 PROCEDURE — 0JH632Z INSERTION OF MONITORING DEVICE INTO CHEST SUBCUTANEOUS TISSUE AND FASCIA, PERCUTANEOUS APPROACH: ICD-10-PCS | Performed by: INTERNAL MEDICINE

## 2018-06-18 PROCEDURE — 33282 HC INSERT SUBCUT CARDIAC RHYTHM MONITOR INCL PROGRAM: CPT

## 2018-06-18 RX ORDER — ASPIRIN 81 MG/1
81 TABLET, CHEWABLE ORAL DAILY
COMMUNITY
End: 2020-11-03

## 2018-06-18 RX ORDER — LIDOCAINE HYDROCHLORIDE AND EPINEPHRINE 10; 10 MG/ML; UG/ML
INJECTION, SOLUTION INFILTRATION; PERINEURAL
Status: COMPLETED
Start: 2018-06-18 | End: 2018-06-18

## 2018-06-18 RX ORDER — SODIUM CHLORIDE 9 MG/ML
INJECTION, SOLUTION INTRAVENOUS CONTINUOUS
Status: DISCONTINUED | OUTPATIENT
Start: 2018-06-18 | End: 2018-06-18

## 2018-06-18 NOTE — PROGRESS NOTES
Jaky Moore  [id number]  6/18/2018    Pt is able to sit up and ambulate without difficulty. Pt voided and tolerated fluids/food. Pt's vital signs are stable. Procedural site's dressing remains dry and intact. No signs and symptoms of bleeding noted.  Instruc

## 2018-06-20 NOTE — PROCEDURES
Procedure- LINQ device implant (Loop Monitor)    Agnes Navas MD    Indication- Arrhythmia evaluation, syncope, palp    Complication- none  EBL - minimal  Sedation - local lidocaine    Methods- The patient was prepped and draped in a sterile manner

## 2018-08-16 ENCOUNTER — HOSPITAL ENCOUNTER (OUTPATIENT)
Dept: MRI IMAGING | Facility: HOSPITAL | Age: 73
Discharge: HOME | End: 2018-08-16
Attending: Other
Payer: MEDICARE

## 2018-08-16 DIAGNOSIS — I67.1 CEREBRAL ANEURYSM: ICD-10-CM

## 2018-08-16 DIAGNOSIS — R55 SYNCOPE, UNSPECIFIED SYNCOPE TYPE: ICD-10-CM

## 2018-08-16 PROCEDURE — 70544 MR ANGIOGRAPHY HEAD W/O DYE: CPT | Performed by: OTHER

## 2018-10-02 ENCOUNTER — OFFICE VISIT (OUTPATIENT)
Dept: NEUROLOGY | Facility: CLINIC | Age: 73
End: 2018-10-02
Payer: MEDICARE

## 2018-10-02 ENCOUNTER — TELEPHONE (OUTPATIENT)
Dept: NEUROLOGY | Facility: CLINIC | Age: 73
End: 2018-10-02

## 2018-10-02 VITALS
RESPIRATION RATE: 15 BRPM | SYSTOLIC BLOOD PRESSURE: 115 MMHG | DIASTOLIC BLOOD PRESSURE: 70 MMHG | HEIGHT: 65 IN | WEIGHT: 143 LBS | HEART RATE: 74 BPM | BODY MASS INDEX: 23.82 KG/M2

## 2018-10-02 DIAGNOSIS — I67.1 CEREBRAL ANEURYSM: ICD-10-CM

## 2018-10-02 DIAGNOSIS — S06.6X1S SUBARACHNOID HEMATOMA, WITH LOSS OF CONSCIOUSNESS OF 30 MINUTES OR LESS, SEQUELA (HCC): ICD-10-CM

## 2018-10-02 DIAGNOSIS — I67.9 CEREBROVASCULAR DISEASE: ICD-10-CM

## 2018-10-02 DIAGNOSIS — R55 SYNCOPE, UNSPECIFIED SYNCOPE TYPE: Primary | ICD-10-CM

## 2018-10-02 PROCEDURE — 99214 OFFICE O/P EST MOD 30 MIN: CPT | Performed by: OTHER

## 2018-10-03 NOTE — PROGRESS NOTES
Neurology Outpatient follow-up note    Allen Reeves : 1945   HPI:     Allen Reeves is a 72year old female who is being seen in follow-up. I saw her last in May of this year. She is accompanied by her  to the visit today.   In the interim since ergocalciferol 40639 units Oral Cap Take 50,000 Units by mouth every 7 days. Disp:  Rfl:    B Complex-C-Folic Acid (HM VITAMIN B COMPLEX/VITAMIN C) Oral Tab Take 1 tablet by mouth daily.  Disp:  Rfl:    Calcium Carbonate-Vitamin D 500-400 MG-UNIT Oral Tab Exercise: Yes          3-4 days a week        Seat Belt: Not Asked        Special Diet: Not Asked        Stress Concern: Not Asked        Weight Concern: Not Asked         Service: Not Asked        Blood Transfusions: Not Asked        Occupatio intervention is planned, continued imaging   surveillance is advised. 2. No definite additional intracranial aneurysm. 3. No evidence of intracranial flow limiting stenosis. ASSESSMENT AND PLAN:   Assessment   1. Syncope, unspecified syncope type  2.

## 2018-10-09 ENCOUNTER — PRIOR ORIGINAL RECORDS (OUTPATIENT)
Dept: OTHER | Age: 73
End: 2018-10-09

## 2018-10-13 ENCOUNTER — LAB ENCOUNTER (OUTPATIENT)
Dept: LAB | Facility: HOSPITAL | Age: 73
End: 2018-10-13
Attending: INTERNAL MEDICINE
Payer: MEDICARE

## 2018-10-13 ENCOUNTER — PRIOR ORIGINAL RECORDS (OUTPATIENT)
Dept: OTHER | Age: 73
End: 2018-10-13

## 2018-10-13 DIAGNOSIS — I51.9 MYXEDEMA HEART DISEASE: ICD-10-CM

## 2018-10-13 DIAGNOSIS — D50.8 IRON DEFICIENCY ANEMIA SECONDARY TO INADEQUATE DIETARY IRON INTAKE: ICD-10-CM

## 2018-10-13 DIAGNOSIS — E11.9 DIABETES MELLITUS (HCC): Primary | ICD-10-CM

## 2018-10-13 DIAGNOSIS — E78.2 MIXED HYPERLIPIDEMIA: ICD-10-CM

## 2018-10-13 DIAGNOSIS — E03.9 MYXEDEMA HEART DISEASE: ICD-10-CM

## 2018-10-13 DIAGNOSIS — E55.9 AVITAMINOSIS D: ICD-10-CM

## 2018-10-13 PROCEDURE — 84439 ASSAY OF FREE THYROXINE: CPT

## 2018-10-13 PROCEDURE — 82306 VITAMIN D 25 HYDROXY: CPT

## 2018-10-13 PROCEDURE — 84443 ASSAY THYROID STIM HORMONE: CPT

## 2018-10-13 PROCEDURE — 80061 LIPID PANEL: CPT

## 2018-10-13 PROCEDURE — 83036 HEMOGLOBIN GLYCOSYLATED A1C: CPT

## 2018-10-13 PROCEDURE — 85025 COMPLETE CBC W/AUTO DIFF WBC: CPT

## 2018-10-13 PROCEDURE — 80053 COMPREHEN METABOLIC PANEL: CPT

## 2018-10-13 PROCEDURE — 36415 COLL VENOUS BLD VENIPUNCTURE: CPT

## 2018-10-22 ENCOUNTER — PRIOR ORIGINAL RECORDS (OUTPATIENT)
Dept: OTHER | Age: 73
End: 2018-10-22

## 2018-10-23 ENCOUNTER — PRIOR ORIGINAL RECORDS (OUTPATIENT)
Dept: OTHER | Age: 73
End: 2018-10-23

## 2018-10-23 ENCOUNTER — MYAURORA ACCOUNT LINK (OUTPATIENT)
Dept: OTHER | Age: 73
End: 2018-10-23

## 2018-10-23 ENCOUNTER — HOSPITAL ENCOUNTER (OUTPATIENT)
Dept: CT IMAGING | Facility: HOSPITAL | Age: 73
Discharge: HOME | End: 2018-10-23
Attending: INTERNAL MEDICINE
Payer: MEDICARE

## 2018-10-23 ENCOUNTER — LAB ENCOUNTER (OUTPATIENT)
Dept: LAB | Facility: HOSPITAL | Age: 73
End: 2018-10-23
Attending: INTERNAL MEDICINE
Payer: MEDICARE

## 2018-10-23 DIAGNOSIS — I10 HTN (HYPERTENSION): Primary | ICD-10-CM

## 2018-10-23 DIAGNOSIS — R91.1 LUNG NODULE: ICD-10-CM

## 2018-10-23 PROCEDURE — 82565 ASSAY OF CREATININE: CPT

## 2018-10-23 PROCEDURE — 85025 COMPLETE CBC W/AUTO DIFF WBC: CPT

## 2018-10-23 PROCEDURE — 71260 CT THORAX DX C+: CPT | Performed by: INTERNAL MEDICINE

## 2018-10-23 PROCEDURE — 36415 COLL VENOUS BLD VENIPUNCTURE: CPT

## 2018-10-24 ENCOUNTER — PRIOR ORIGINAL RECORDS (OUTPATIENT)
Dept: OTHER | Age: 73
End: 2018-10-24

## 2018-10-24 LAB
CHOLESTEROL, TOTAL: 246 MG/DL
HDL CHOLESTEROL: 61 MG/DL
HEMATOCRIT: 39.2 %
HEMOGLOBIN: 13.1 G/DL
LDL CHOLESTEROL: 164 MG/DL
NON-HDL CHOLESTEROL: 185 MG/DL
PLATELETS: 199 K/UL
RED BLOOD COUNT: 4.1 X 10-6/U
TRIGLYCERIDES: 105 MG/DL
WHITE BLOOD COUNT: 5.2 X 10-3/U

## 2018-10-29 ENCOUNTER — HOSPITAL (OUTPATIENT)
Dept: OTHER | Age: 73
End: 2018-10-29
Attending: OBSTETRICS & GYNECOLOGY

## 2018-11-05 ENCOUNTER — PRIOR ORIGINAL RECORDS (OUTPATIENT)
Dept: OTHER | Age: 73
End: 2018-11-05

## 2018-11-07 ENCOUNTER — HOSPITAL ENCOUNTER (OUTPATIENT)
Dept: INTERVENTIONAL RADIOLOGY/VASCULAR | Facility: HOSPITAL | Age: 73
Discharge: HOME | End: 2018-11-07
Attending: INTERNAL MEDICINE | Admitting: INTERNAL MEDICINE
Payer: MEDICARE

## 2018-11-07 VITALS
RESPIRATION RATE: 15 BRPM | HEART RATE: 62 BPM | OXYGEN SATURATION: 99 % | SYSTOLIC BLOOD PRESSURE: 146 MMHG | DIASTOLIC BLOOD PRESSURE: 65 MMHG

## 2018-11-07 DIAGNOSIS — B99.9 INFECTION: ICD-10-CM

## 2018-11-07 PROCEDURE — 33282 HC INSERT SUBCUT CARDIAC RHYTHM MONITOR INCL PROGRAM: CPT

## 2018-11-07 PROCEDURE — 0JH632Z INSERTION OF MONITORING DEVICE INTO CHEST SUBCUTANEOUS TISSUE AND FASCIA, PERCUTANEOUS APPROACH: ICD-10-PCS | Performed by: INTERNAL MEDICINE

## 2018-11-07 PROCEDURE — 0JPT32Z REMOVAL OF MONITORING DEVICE FROM TRUNK SUBCUTANEOUS TISSUE AND FASCIA, PERCUTANEOUS APPROACH: ICD-10-PCS | Performed by: INTERNAL MEDICINE

## 2018-11-07 PROCEDURE — 33284 HC REMOVAL SUBCUT CARDIAC RHYTHM MONITOR: CPT

## 2018-11-07 RX ORDER — LIDOCAINE HYDROCHLORIDE AND EPINEPHRINE 20; 5 MG/ML; UG/ML
INJECTION, SOLUTION EPIDURAL; INFILTRATION; INTRACAUDAL; PERINEURAL
Status: DISCONTINUED
Start: 2018-11-07 | End: 2018-11-07

## 2018-11-07 RX ORDER — CHLORHEXIDINE GLUCONATE 4 G/100ML
30 SOLUTION TOPICAL
Status: DISCONTINUED | OUTPATIENT
Start: 2018-11-07 | End: 2018-11-07

## 2018-11-07 NOTE — INTERVAL H&P NOTE
Pre-op Diagnosis: loop revision    The above referenced H&P was reviewed by Esteban Recinos MD on 11/7/2018, the patient was examined and no significant changes have occurred in the patient's condition since the H&P was performed.   I discussed with the patient

## 2018-11-07 NOTE — PROCEDURES
Procedure- LOOP confirm device implant new and explant old infected device    Juany Castaneda MD    Indication- Arrhythmia evaluation, syncope, cryptogenic CVA; loop device placed but recent infection/dehissence and device exposure necessitated device

## 2018-11-11 ENCOUNTER — MYAURORA ACCOUNT LINK (OUTPATIENT)
Dept: OTHER | Age: 73
End: 2018-11-11

## 2018-11-14 ENCOUNTER — MYAURORA ACCOUNT LINK (OUTPATIENT)
Dept: OTHER | Age: 73
End: 2018-11-14

## 2018-11-14 ENCOUNTER — PRIOR ORIGINAL RECORDS (OUTPATIENT)
Dept: OTHER | Age: 73
End: 2018-11-14

## 2018-12-12 ENCOUNTER — MYAURORA ACCOUNT LINK (OUTPATIENT)
Dept: OTHER | Age: 73
End: 2018-12-12

## 2018-12-26 ENCOUNTER — PRIOR ORIGINAL RECORDS (OUTPATIENT)
Dept: OTHER | Age: 73
End: 2018-12-26

## 2018-12-26 ENCOUNTER — APPOINTMENT (OUTPATIENT)
Dept: LAB | Facility: HOSPITAL | Age: 73
End: 2018-12-26
Attending: INTERNAL MEDICINE
Payer: MEDICARE

## 2018-12-26 DIAGNOSIS — E78.00 ELEVATED CHOLESTEROL: ICD-10-CM

## 2018-12-26 PROCEDURE — 84450 TRANSFERASE (AST) (SGOT): CPT

## 2018-12-26 PROCEDURE — 36415 COLL VENOUS BLD VENIPUNCTURE: CPT

## 2018-12-26 PROCEDURE — 80061 LIPID PANEL: CPT

## 2018-12-26 PROCEDURE — 84460 ALANINE AMINO (ALT) (SGPT): CPT

## 2018-12-28 ENCOUNTER — PRIOR ORIGINAL RECORDS (OUTPATIENT)
Dept: OTHER | Age: 73
End: 2018-12-28

## 2018-12-28 LAB
ALT (SGPT): 31 U/L
AST (SGOT): 34 U/L
CHOLESTEROL, TOTAL: 129 MG/DL
HDL CHOLESTEROL: 62 MG/DL
LDL CHOLESTEROL: 50 MG/DL
NON-HDL CHOLESTEROL: 67 MG/DL
TRIGLYCERIDES: 84 MG/DL

## 2019-01-14 ENCOUNTER — TELEPHONE (OUTPATIENT)
Dept: NEUROLOGY | Facility: CLINIC | Age: 74
End: 2019-01-14

## 2019-01-14 NOTE — TELEPHONE ENCOUNTER
Last MRA done on 8/16/18. Will check with Dr. Ailyn Wright to see if new MRA needs to be done. Per office note, MRA to be done every 3 months.

## 2019-01-14 NOTE — TELEPHONE ENCOUNTER
Patient last seen on 10/2/18. Per office note, MRA of brain ordered. To be done on in February 2019. Order was placed on 10/2/18 to be done in Feb 2019. Spoke to patient and notified her of above. She was understanding.  She will call scheduling and get

## 2019-02-14 ENCOUNTER — TELEPHONE (OUTPATIENT)
Dept: NEUROLOGY | Facility: CLINIC | Age: 74
End: 2019-02-14

## 2019-02-14 ENCOUNTER — HOSPITAL ENCOUNTER (OUTPATIENT)
Dept: MRI IMAGING | Facility: HOSPITAL | Age: 74
Discharge: HOME | End: 2019-02-14
Attending: Other
Payer: MEDICARE

## 2019-02-14 DIAGNOSIS — I67.1 CEREBRAL ANEURYSM: Primary | ICD-10-CM

## 2019-02-14 DIAGNOSIS — I67.1 CEREBRAL ANEURYSM: ICD-10-CM

## 2019-02-14 PROCEDURE — 70544 MR ANGIOGRAPHY HEAD W/O DYE: CPT | Performed by: OTHER

## 2019-02-14 NOTE — TELEPHONE ENCOUNTER
Insurance was verified and MRA brain [id number] is a covered benefit and does not require authorization. [employer] is scheduled today.

## 2019-02-28 VITALS
WEIGHT: 143 LBS | BODY MASS INDEX: 23.82 KG/M2 | HEART RATE: 74 BPM | HEIGHT: 65 IN | SYSTOLIC BLOOD PRESSURE: 130 MMHG | DIASTOLIC BLOOD PRESSURE: 72 MMHG

## 2019-02-28 VITALS
HEART RATE: 54 BPM | OXYGEN SATURATION: 98 % | DIASTOLIC BLOOD PRESSURE: 70 MMHG | WEIGHT: 141 LBS | SYSTOLIC BLOOD PRESSURE: 120 MMHG | BODY MASS INDEX: 23.49 KG/M2 | HEIGHT: 65 IN

## 2019-02-28 VITALS
OXYGEN SATURATION: 99 % | HEIGHT: 65 IN | WEIGHT: 135 LBS | DIASTOLIC BLOOD PRESSURE: 60 MMHG | HEART RATE: 57 BPM | BODY MASS INDEX: 22.49 KG/M2 | SYSTOLIC BLOOD PRESSURE: 98 MMHG

## 2019-02-28 VITALS
SYSTOLIC BLOOD PRESSURE: 130 MMHG | DIASTOLIC BLOOD PRESSURE: 78 MMHG | RESPIRATION RATE: 16 BRPM | TEMPERATURE: 96.7 F | HEART RATE: 70 BPM

## 2019-02-28 VITALS
DIASTOLIC BLOOD PRESSURE: 60 MMHG | SYSTOLIC BLOOD PRESSURE: 128 MMHG | BODY MASS INDEX: 22.66 KG/M2 | HEIGHT: 65 IN | HEART RATE: 64 BPM | OXYGEN SATURATION: 95 % | WEIGHT: 136 LBS

## 2019-02-28 VITALS — RESPIRATION RATE: 16 BRPM | DIASTOLIC BLOOD PRESSURE: 60 MMHG | HEART RATE: 64 BPM | SYSTOLIC BLOOD PRESSURE: 114 MMHG

## 2019-02-28 VITALS
DIASTOLIC BLOOD PRESSURE: 83 MMHG | HEART RATE: 64 BPM | RESPIRATION RATE: 18 BRPM | SYSTOLIC BLOOD PRESSURE: 124 MMHG | OXYGEN SATURATION: 99 % | WEIGHT: 143 LBS | HEIGHT: 65 IN | BODY MASS INDEX: 23.82 KG/M2

## 2019-03-08 RX ORDER — METOPROLOL TARTRATE 50 MG/1
TABLET, FILM COATED ORAL
COMMUNITY
End: 2019-04-18 | Stop reason: CLARIF

## 2019-03-08 RX ORDER — CEPHALEXIN 500 MG/1
CAPSULE ORAL
COMMUNITY
End: 2019-04-18 | Stop reason: CLARIF

## 2019-03-08 RX ORDER — LOSARTAN POTASSIUM 25 MG/1
TABLET ORAL
COMMUNITY
End: 2019-04-18 | Stop reason: CLARIF

## 2019-03-08 RX ORDER — ATORVASTATIN CALCIUM 40 MG/1
TABLET, FILM COATED ORAL
COMMUNITY
End: 2019-04-18 | Stop reason: CLARIF

## 2019-03-08 RX ORDER — ERGOCALCIFEROL 1.25 MG/1
CAPSULE ORAL
COMMUNITY
End: 2023-01-30 | Stop reason: SDUPTHER

## 2019-03-13 PROBLEM — E78.00 PURE HYPERCHOLESTEROLEMIA: Status: ACTIVE | Noted: 2019-03-13

## 2019-03-15 ENCOUNTER — HOSPITAL ENCOUNTER (INPATIENT)
Facility: HOSPITAL | Age: 74
LOS: 5 days | Discharge: HOME | DRG: 483 | End: 2019-03-20
Admitting: INTERNAL MEDICINE
Payer: MEDICARE

## 2019-03-15 ENCOUNTER — APPOINTMENT (OUTPATIENT)
Dept: GENERAL RADIOLOGY | Facility: HOSPITAL | Age: 74
DRG: 483 | End: 2019-03-15
Payer: MEDICARE

## 2019-03-15 DIAGNOSIS — S42.402A LEFT ELBOW FRACTURE, CLOSED, INITIAL ENCOUNTER: Primary | ICD-10-CM
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
## 2019-03-15 PROBLEM — S42.492A: Status: ACTIVE | Noted: 2019-03-15

## 2019-03-15 PROBLEM — M80.022A: Status: ACTIVE | Noted: 2019-03-15

## 2019-03-15 LAB
ANION GAP SERPL CALC-SCNC: 6 MMOL/L (ref 0–18)
BASOPHILS # BLD AUTO: 0.04 X10(3) UL (ref 0–0.2)
BASOPHILS NFR BLD AUTO: 0.5 %
BUN BLD-MCNC: 12 MG/DL (ref 7–18)
BUN/CREAT SERPL: 12.5 (ref 10–20)
CALCIUM BLD-MCNC: 8.9 MG/DL (ref 8.5–10.1)
CHLORIDE SERPL-SCNC: 101 MMOL/L (ref 98–107)
CO2 SERPL-SCNC: 27 MMOL/L (ref 21–32)
CREAT BLD-MCNC: 0.96 MG/DL (ref 0.55–1.02)
DEPRECATED RDW RBC AUTO: 43.3 FL (ref 35.1–46.3)
EOSINOPHIL # BLD AUTO: 0.03 X10(3) UL (ref 0–0.7)
EOSINOPHIL NFR BLD AUTO: 0.4 %
ERYTHROCYTE [DISTWIDTH] IN BLOOD BY AUTOMATED COUNT: 12.5 % (ref 11–15)
GLUCOSE BLD-MCNC: 120 MG/DL (ref 70–99)
HCT VFR BLD AUTO: 37 % (ref 35–48)
HGB BLD-MCNC: 12.4 G/DL (ref 12–16)
IMM GRANULOCYTES # BLD AUTO: 0.03 X10(3) UL (ref 0–1)
IMM GRANULOCYTES NFR BLD: 0.4 %
LYMPHOCYTES # BLD AUTO: 1.36 X10(3) UL (ref 1–4)
LYMPHOCYTES NFR BLD AUTO: 17.2 %
MCH RBC QN AUTO: 32 PG (ref 26–34)
MCHC RBC AUTO-ENTMCNC: 33.5 G/DL (ref 31–37)
MCV RBC AUTO: 95.4 FL (ref 80–100)
MONOCYTES # BLD AUTO: 0.37 X10(3) UL (ref 0.1–1)
MONOCYTES NFR BLD AUTO: 4.7 %
MRSA DNA SPEC QL NAA+PROBE: NEGATIVE
NEUTROPHILS # BLD AUTO: 6.06 X10 (3) UL (ref 1.5–7.7)
NEUTROPHILS # BLD AUTO: 6.06 X10(3) UL (ref 1.5–7.7)
NEUTROPHILS NFR BLD AUTO: 76.8 %
OSMOLALITY SERPL CALC.SUM OF ELEC: 279 MOSM/KG (ref 275–295)
PLATELET # BLD AUTO: 166 10(3)UL (ref 150–450)
POTASSIUM SERPL-SCNC: 4.3 MMOL/L (ref 3.5–5.1)
RBC # BLD AUTO: 3.88 X10(6)UL (ref 3.8–5.3)
SODIUM SERPL-SCNC: 134 MMOL/L (ref 136–145)
WBC # BLD AUTO: 7.9 X10(3) UL (ref 4–11)

## 2019-03-15 PROCEDURE — 80048 BASIC METABOLIC PNL TOTAL CA: CPT | Performed by: EMERGENCY MEDICINE

## 2019-03-15 PROCEDURE — 73080 X-RAY EXAM OF ELBOW: CPT

## 2019-03-15 PROCEDURE — 99285 EMERGENCY DEPT VISIT HI MDM: CPT

## 2019-03-15 PROCEDURE — 29105 APPLICATION LONG ARM SPLINT: CPT

## 2019-03-15 PROCEDURE — 85025 COMPLETE CBC W/AUTO DIFF WBC: CPT | Performed by: EMERGENCY MEDICINE

## 2019-03-15 PROCEDURE — 93010 ELECTROCARDIOGRAM REPORT: CPT | Performed by: EMERGENCY MEDICINE

## 2019-03-15 PROCEDURE — 2W3BX1Z IMMOBILIZATION OF LEFT UPPER ARM USING SPLINT: ICD-10-PCS | Performed by: EMERGENCY MEDICINE

## 2019-03-15 PROCEDURE — 87641 MR-STAPH DNA AMP PROBE: CPT | Performed by: EMERGENCY MEDICINE

## 2019-03-15 PROCEDURE — 93298 REM INTERROG DEV EVAL SCRMS: CPT | Performed by: INTERNAL MEDICINE

## 2019-03-15 PROCEDURE — 93005 ELECTROCARDIOGRAM TRACING: CPT

## 2019-03-15 RX ORDER — ASPIRIN 81 MG/1
81 TABLET, CHEWABLE ORAL DAILY
Status: DISCONTINUED | OUTPATIENT
Start: 2019-03-15 | End: 2019-03-20

## 2019-03-15 RX ORDER — METOPROLOL SUCCINATE 50 MG/1
50 TABLET, EXTENDED RELEASE ORAL 2 TIMES DAILY
Status: DISCONTINUED | OUTPATIENT
Start: 2019-03-15 | End: 2019-03-20

## 2019-03-15 RX ORDER — MELATONIN
325
Status: DISCONTINUED | OUTPATIENT
Start: 2019-03-16 | End: 2019-03-20

## 2019-03-15 RX ORDER — TRAMADOL HYDROCHLORIDE 50 MG/1
50 TABLET ORAL EVERY 6 HOURS PRN
Status: DISCONTINUED | OUTPATIENT
Start: 2019-03-15 | End: 2019-03-20

## 2019-03-15 RX ORDER — ENOXAPARIN SODIUM 100 MG/ML
40 INJECTION SUBCUTANEOUS NIGHTLY
Status: DISCONTINUED | OUTPATIENT
Start: 2019-03-15 | End: 2019-03-18

## 2019-03-15 RX ORDER — SODIUM CHLORIDE 9 MG/ML
INJECTION, SOLUTION INTRAVENOUS CONTINUOUS
Status: DISCONTINUED | OUTPATIENT
Start: 2019-03-15 | End: 2019-03-20

## 2019-03-15 RX ORDER — OYSTER SHELL CALCIUM WITH VITAMIN D 500; 200 MG/1; [IU]/1
1 TABLET, FILM COATED ORAL DAILY
Status: DISCONTINUED | OUTPATIENT
Start: 2019-03-16 | End: 2019-03-20

## 2019-03-15 RX ORDER — IBUPROFEN 400 MG/1
400 TABLET ORAL ONCE
Status: COMPLETED | OUTPATIENT
Start: 2019-03-15 | End: 2019-03-15

## 2019-03-15 RX ORDER — ATORVASTATIN CALCIUM 40 MG/1
40 TABLET, FILM COATED ORAL
Status: DISCONTINUED | OUTPATIENT
Start: 2019-03-15 | End: 2019-03-20

## 2019-03-15 RX ORDER — 0.9 % SODIUM CHLORIDE 0.9 %
VIAL (ML) INJECTION
Status: DISPENSED
Start: 2019-03-15 | End: 2019-03-16

## 2019-03-15 RX ORDER — ACETAMINOPHEN 325 MG/1
650 TABLET ORAL EVERY 6 HOURS PRN
Status: DISCONTINUED | OUTPATIENT
Start: 2019-03-15 | End: 2019-03-20

## 2019-03-15 RX ORDER — VITAMIN C
1 TAB ORAL DAILY
Status: DISCONTINUED | OUTPATIENT
Start: 2019-03-16 | End: 2019-03-20

## 2019-03-15 RX ORDER — ATORVASTATIN CALCIUM 20 MG/1
20 TABLET, FILM COATED ORAL
COMMUNITY

## 2019-03-15 RX ORDER — LOSARTAN POTASSIUM 25 MG/1
25 TABLET ORAL DAILY
Status: DISCONTINUED | OUTPATIENT
Start: 2019-03-16 | End: 2019-03-20

## 2019-03-15 RX ORDER — DOXEPIN HYDROCHLORIDE 50 MG/1
1 CAPSULE ORAL DAILY
Status: DISCONTINUED | OUTPATIENT
Start: 2019-03-16 | End: 2019-03-20

## 2019-03-15 RX ORDER — DOCUSATE SODIUM 100 MG/1
100 CAPSULE, LIQUID FILLED ORAL NIGHTLY
Status: DISCONTINUED | OUTPATIENT
Start: 2019-03-15 | End: 2019-03-19

## 2019-03-15 RX ORDER — DOCUSATE SODIUM 100 MG/1
100 CAPSULE, LIQUID FILLED ORAL NIGHTLY
COMMUNITY
End: 2020-11-03

## 2019-03-15 NOTE — CONSULTS
[facility] AND [facility]  [facility] Cardiology Consult Note    Paula Nunez Patient Status:  Emergency    1945 MRN [id number]   Location [address] Attending Melida Lema MD   Hosp Day # 0 PCP Elvi Duran MD     72 year 08/2017    Left and right eyes   • COLONOSCOPY N/A 7/21/2017    Performed by Jignesh Arroyo MD at [facility] ENDOSCOPY   • ESOPHAGOGASTRODUODENOSCOPY (EGD) N/A 7/21/2017    Performed by Jignesh Arroyo MD at [address] [place] [address] Left 3/1/2017    Performed b

## 2019-03-15 NOTE — PLAN OF CARE
DISCHARGE PLANNING    • Discharge to home or other facility with appropriate resources Progressing        TBD at this time. Most likely home with  after surgery lara. For Monday.     MUSCULOSKELETAL - ADULT    • Return mobility to safest level of func  and band aide applied by Ham Crespo.

## 2019-03-15 NOTE — CONSULTS
[facility]    PATIENT'S NAME: TEX HERNANDEZ   ATTENDING PHYSICIAN: Vandana Wade MD   CONSULTING PHYSICIAN: Sandee Arredondo MD   PATIENT ACCOUNT#:   [id number]    LOCATION:  [address] [place] [address] Ave #:   [id number]       DATE OF BIRTH: vomiting and Tegaderm which gives her localized reaction. FAMILY HISTORY:  Noncontributory. SOCIAL HISTORY:  She lived with her  independently.   She has not driven in many years, but walks independently and does not use cane, crutch, or walker patient this weekend. For now she should elevate and ice. I told her and her  to notify the nurse if any numbness or tingling develops. I did discuss the case with Dr. Tuan Noyola in the emergency room. Thank you for this consultation.      Dictated

## 2019-03-15 NOTE — ED NOTES
Per HIRO Cabrera note patient to have long arm/stirup splinting. Clarified with with MD Yanci Britton. MD quevedo states long arm with sling is sufficient at this time.

## 2019-03-15 NOTE — ED PROVIDER NOTES
Patient Seen in: [facility] AND [facility] Emergency Department    History   Patient presents with:  Upper Extremity Injury (musculoskeletal)    Stated Complaint: fall/ L arm pain    HPI    Patient presents to the emergency department today with complaint of sev daily. Calcium Carbonate-Vitamin D 500-400 MG-UNIT Oral Tab,  Take 1 tablet by mouth daily. No family history on file.     Social History    Tobacco Use      Smoking status: Never Smoker      Smokeless tobacco: Never Used    Alcohol use: No    Drug will give dose of oral ibuprofen. I did contact and talk with physician assistant. She did talk with Dr. Debbie Morales who did examined the x-rays he felt this patient may need elbow replacement.   I did talk to the family I did show the  the x-ray sh

## 2019-03-15 NOTE — CONSULTS
Spoke with Dr. Marisa Urrutia over the phone regarding new consult. Per Dr. Marisa Urrutia, patient's left upper extremity is NVI. X-rays of the left elbow reviewed by Dr. Delores Don, Dr. Karen Arredondo, and myself.  Dr. Delores Don recommends a left total elbow replacement given the fra

## 2019-03-15 NOTE — CONSULTS
Dx: Comminuted right distal humerus intercondylar fx closed in patient with osteoporosis. Neuro and vascular intact. No other injuries. Dr. Codi Carter assuming care over the weekend and plans elbow replacement Monday.  Would appreciate Cardiology input as to

## 2019-03-15 NOTE — ED NOTES
Report given to [employer]. Patient and family updated on room placement. Patient states pain is manageable at this time.

## 2019-03-15 NOTE — ED INITIAL ASSESSMENT (HPI)
C/o L elbow pain after falling on concrete today, deformity noted, swelling/bruising, radial pulse 2+

## 2019-03-16 LAB
ANION GAP SERPL CALC-SCNC: 10 MMOL/L (ref 0–18)
BUN BLD-MCNC: 12 MG/DL (ref 7–18)
BUN/CREAT SERPL: 14.1 (ref 10–20)
CALCIUM BLD-MCNC: 8.4 MG/DL (ref 8.5–10.1)
CHLORIDE SERPL-SCNC: 107 MMOL/L (ref 98–107)
CO2 SERPL-SCNC: 24 MMOL/L (ref 21–32)
CREAT BLD-MCNC: 0.85 MG/DL (ref 0.55–1.02)
GLUCOSE BLD-MCNC: 117 MG/DL (ref 70–99)
OSMOLALITY SERPL CALC.SUM OF ELEC: 293 MOSM/KG (ref 275–295)
POTASSIUM SERPL-SCNC: 3.8 MMOL/L (ref 3.5–5.1)
SODIUM SERPL-SCNC: 141 MMOL/L (ref 136–145)

## 2019-03-16 PROCEDURE — 80048 BASIC METABOLIC PNL TOTAL CA: CPT | Performed by: INTERNAL MEDICINE

## 2019-03-16 RX ORDER — POTASSIUM CHLORIDE 20 MEQ/1
40 TABLET, EXTENDED RELEASE ORAL ONCE
Status: COMPLETED | OUTPATIENT
Start: 2019-03-16 | End: 2019-03-16

## 2019-03-16 NOTE — ICD/PM
LOOP RECORDER INTERROGATION:    Pt has [employer] medical loop recorder implanted November 2018. Device interrogated, shows no bradycardia, tachycardia, atrial fibrillation or pauses. Excellent sensing noted.     Parameters are set to capture :   Heart rates

## 2019-03-16 NOTE — PROGRESS NOTES
[facility]    Progress Note  [facility]    Ethan Sommer Patient Status:  Inpatient    1945 MRN [id number]   Location [facility] 4W/SW/SE Attending Jeremiah Lopez MD   Hosp Day # 1 PCP Varghese Moran MD       As NaCl infusion, , Intravenous, Continuous    Objective:   Blood pressure 126/53, pulse 65, temperature 98 °F (36.7 °C), temperature source Oral, resp. rate 18, height 5' 5\" (1.651 m), weight 143 lb (64.9 kg), SpO2 96 %.     General appearance: alert, appear

## 2019-03-16 NOTE — CONSULTS
Subjective: No complaints. Pain controlled.     Objective:    Patient Vitals for the past 24 hrs:   BP Temp Temp src Pulse Resp SpO2 Height Weight   03/16/19 0826 115/50 97.9 °F (36.6 °C) Oral 65 18 97 % — —   03/16/19 0518 — — — 57 — — — —   03/16/19 0426

## 2019-03-16 NOTE — H&P
[facility]    H&P  [facility]    Jeromy Garvin Patient Status:  Inpatient    1945 MRN [id number]   Location [facility] 4W/SW/SE Attending Mariaa Quiroga MD   Hosp Day # 0 PCP Elsy Plascencia MD     Date of Admi alcohol or use drugs.     Allergies:    Morphine                NAUSEA AND VOMITING  Tegaderm Ag Mesh 2\"*    UNKNOWN    Comment:Skin Bubbles up    Medications:    Current Facility-Administered Medications:   •  Sodium Chloride 0.9 % solution, , ,   •  aspi Neck: Supple. Lungs: Clear bilaterally. Cardiac: Regular rate and rhythm. Abdomen: Bowel sounds present, normoactive. Nontender.   Extremities: Left upper extremity in sling, full function of the fingers of left hand, normal sensory exam      Labs, I

## 2019-03-16 NOTE — CONSULTS
[facility] AND [facility]  [facility] Cardiology Consult Note    Jabarifrancisco javier Kim Patient Status:  Emergency    1945 MRN [id number]   Location [address] Attending Andrei Reyes MD   Hosp Day # 1 PCP Derian Haro MD     72 year to face counseling and discussing the cardiac plan of care.     Micha Stewart DO, [facility]  [facility] Cardiology    --------------------------------------------------------------------------------------------------------------------------------  ROS 10 systems revie Radha Worrell DO, [facility]  [facility] Cardiology

## 2019-03-16 NOTE — PLAN OF CARE
MUSCULOSKELETAL - ADULT    • Return mobility to safest level of function Not Progressing        SAFETY ADULT - FALL    • Free from fall injury Not Progressing          MUSCULOSKELETAL - ADULT    • Maintain proper alignment of affected body part Progressing

## 2019-03-17 ENCOUNTER — APPOINTMENT (OUTPATIENT)
Dept: CV DIAGNOSTICS | Facility: HOSPITAL | Age: 74
DRG: 483 | End: 2019-03-17
Attending: INTERNAL MEDICINE
Payer: MEDICARE

## 2019-03-17 LAB
ANION GAP SERPL CALC-SCNC: 6 MMOL/L (ref 0–18)
BASOPHILS # BLD AUTO: 0.05 X10(3) UL (ref 0–0.2)
BASOPHILS NFR BLD AUTO: 0.9 %
BUN BLD-MCNC: 9 MG/DL (ref 7–18)
BUN/CREAT SERPL: 12.9 (ref 10–20)
CALCIUM BLD-MCNC: 8.4 MG/DL (ref 8.5–10.1)
CHLORIDE SERPL-SCNC: 111 MMOL/L (ref 98–107)
CO2 SERPL-SCNC: 25 MMOL/L (ref 21–32)
CREAT BLD-MCNC: 0.7 MG/DL (ref 0.55–1.02)
DEPRECATED RDW RBC AUTO: 45 FL (ref 35.1–46.3)
EOSINOPHIL # BLD AUTO: 0.27 X10(3) UL (ref 0–0.7)
EOSINOPHIL NFR BLD AUTO: 4.8 %
ERYTHROCYTE [DISTWIDTH] IN BLOOD BY AUTOMATED COUNT: 12.7 % (ref 11–15)
GLUCOSE BLD-MCNC: 100 MG/DL (ref 70–99)
HCT VFR BLD AUTO: 31.5 % (ref 35–48)
HGB BLD-MCNC: 10.4 G/DL (ref 12–16)
IMM GRANULOCYTES # BLD AUTO: 0.01 X10(3) UL (ref 0–1)
IMM GRANULOCYTES NFR BLD: 0.2 %
LYMPHOCYTES # BLD AUTO: 1.97 X10(3) UL (ref 1–4)
LYMPHOCYTES NFR BLD AUTO: 35.2 %
MCH RBC QN AUTO: 32 PG (ref 26–34)
MCHC RBC AUTO-ENTMCNC: 33 G/DL (ref 31–37)
MCV RBC AUTO: 96.9 FL (ref 80–100)
MONOCYTES # BLD AUTO: 0.61 X10(3) UL (ref 0.1–1)
MONOCYTES NFR BLD AUTO: 10.9 %
NEUTROPHILS # BLD AUTO: 2.68 X10 (3) UL (ref 1.5–7.7)
NEUTROPHILS # BLD AUTO: 2.68 X10(3) UL (ref 1.5–7.7)
NEUTROPHILS NFR BLD AUTO: 48 %
OSMOLALITY SERPL CALC.SUM OF ELEC: 293 MOSM/KG (ref 275–295)
PLATELET # BLD AUTO: 154 10(3)UL (ref 150–450)
POTASSIUM SERPL-SCNC: 4.4 MMOL/L (ref 3.5–5.1)
POTASSIUM SERPL-SCNC: 4.4 MMOL/L (ref 3.5–5.1)
RBC # BLD AUTO: 3.25 X10(6)UL (ref 3.8–5.3)
SODIUM SERPL-SCNC: 142 MMOL/L (ref 136–145)
WBC # BLD AUTO: 5.6 X10(3) UL (ref 4–11)

## 2019-03-17 PROCEDURE — 93306 TTE W/DOPPLER COMPLETE: CPT | Performed by: INTERNAL MEDICINE

## 2019-03-17 PROCEDURE — 85025 COMPLETE CBC W/AUTO DIFF WBC: CPT | Performed by: INTERNAL MEDICINE

## 2019-03-17 PROCEDURE — 84132 ASSAY OF SERUM POTASSIUM: CPT | Performed by: INTERNAL MEDICINE

## 2019-03-17 PROCEDURE — 80048 BASIC METABOLIC PNL TOTAL CA: CPT | Performed by: INTERNAL MEDICINE

## 2019-03-17 RX ORDER — CEFAZOLIN SODIUM/WATER 2 G/20 ML
2 SYRINGE (ML) INTRAVENOUS ONCE
Status: DISCONTINUED | OUTPATIENT
Start: 2019-03-18 | End: 2019-03-18 | Stop reason: HOSPADM

## 2019-03-17 NOTE — PROGRESS NOTES
Subjective: Continued left elbow pain. Pain controlled overall. Left upper extremity long arm splint and sling in place.     Objective:    Patient Vitals for the past 24 hrs:   BP Temp Temp src Pulse Resp SpO2   03/17/19 0828 133/60 97.2 °F (36.2 °C) Oral 6 antibiotics. Continue pain control. Continue DVT prophylaxis. Continue left arm long arm posterior mold splint and sling. Keep splint and sling clean and dry. NWB LUE. No lifting, pushing, or pulling with LUE.      Marivel Hearn PA-C

## 2019-03-17 NOTE — PLAN OF CARE
DISCHARGE PLANNING    • Discharge to home or other facility with appropriate resources Progressing    Pending post op recovery/progress      MUSCULOSKELETAL - ADULT    • Return mobility to safest level of function Progressing    • Maintain proper alignment

## 2019-03-17 NOTE — PROGRESS NOTES
[facility] - [facility]    Progress Note  [facility]    Whit Aubree Patient Status:  Inpatient    1945 MRN [id number]   Location [facility] 4W/SW/SE Attending Sabine Metz MD   Hosp Day # 2 PCP Grace Reyes MD       As multivitamin (ADULT) tab 1 tablet, 1 tablet, Oral, Daily  •  Enoxaparin Sodium (LOVENOX) 40 MG/0.4ML injection 40 mg, 40 mg, Subcutaneous, Nightly  •  acetaminophen (TYLENOL) tab 650 mg, 650 mg, Oral, Q6H PRN  •  traMADol HCl (ULTRAM) tab 50 mg, 50 mg, Ora Rhythm Poor R wave progression consider old anterior wall MI; may be normal variant or related to lead placement WITHIN NORMAL LIMITS When compared with ECG of 03/01/2017 12:14:41 No change Electronically signed on 03/15/2019 at 16:43 by Anais Villagomez MD

## 2019-03-17 NOTE — PROGRESS NOTES
[facility] AND [facility]  [facility] Cardiology Consult Note           Marques Sharif Patient Status:  Emergency    1945 MRN [id number]   Location [address] Attending Emma White MD   Hosp Day # 1 PCP Marco Patel MD findings above.   ROS     History:       Past Medical History:   Diagnosis Date   • Arrhythmia     • Encounter for colonoscopy due to history of colonic polyp 7/21/2017   • Esophageal reflux     • Essential hypertension              Past Surgical History:

## 2019-03-18 ENCOUNTER — ANESTHESIA EVENT (OUTPATIENT)
Dept: SURGERY | Facility: HOSPITAL | Age: 74
DRG: 483 | End: 2019-03-18
Payer: MEDICARE

## 2019-03-18 ENCOUNTER — APPOINTMENT (OUTPATIENT)
Dept: GENERAL RADIOLOGY | Facility: HOSPITAL | Age: 74
DRG: 483 | End: 2019-03-18
Attending: ORTHOPAEDIC SURGERY
Payer: MEDICARE

## 2019-03-18 ENCOUNTER — ANESTHESIA (OUTPATIENT)
Dept: SURGERY | Facility: HOSPITAL | Age: 74
DRG: 483 | End: 2019-03-18
Payer: MEDICARE

## 2019-03-18 LAB
ANION GAP SERPL CALC-SCNC: 6 MMOL/L (ref 0–18)
BASOPHILS # BLD AUTO: 0.05 X10(3) UL (ref 0–0.2)
BASOPHILS NFR BLD AUTO: 0.8 %
BUN BLD-MCNC: 9 MG/DL (ref 7–18)
BUN/CREAT SERPL: 13.6 (ref 10–20)
CALCIUM BLD-MCNC: 8.2 MG/DL (ref 8.5–10.1)
CHLORIDE SERPL-SCNC: 110 MMOL/L (ref 98–107)
CO2 SERPL-SCNC: 25 MMOL/L (ref 21–32)
CREAT BLD-MCNC: 0.66 MG/DL (ref 0.55–1.02)
DEPRECATED RDW RBC AUTO: 44.4 FL (ref 35.1–46.3)
EOSINOPHIL # BLD AUTO: 0.32 X10(3) UL (ref 0–0.7)
EOSINOPHIL NFR BLD AUTO: 5.1 %
ERYTHROCYTE [DISTWIDTH] IN BLOOD BY AUTOMATED COUNT: 12.6 % (ref 11–15)
GLUCOSE BLD-MCNC: 94 MG/DL (ref 70–99)
HCT VFR BLD AUTO: 30.6 % (ref 35–48)
HGB BLD-MCNC: 10.1 G/DL (ref 12–16)
IMM GRANULOCYTES # BLD AUTO: 0.01 X10(3) UL (ref 0–1)
IMM GRANULOCYTES NFR BLD: 0.2 %
LYMPHOCYTES # BLD AUTO: 2.3 X10(3) UL (ref 1–4)
LYMPHOCYTES NFR BLD AUTO: 36.9 %
MCH RBC QN AUTO: 31.8 PG (ref 26–34)
MCHC RBC AUTO-ENTMCNC: 33 G/DL (ref 31–37)
MCV RBC AUTO: 96.2 FL (ref 80–100)
MONOCYTES # BLD AUTO: 0.58 X10(3) UL (ref 0.1–1)
MONOCYTES NFR BLD AUTO: 9.3 %
NEUTROPHILS # BLD AUTO: 2.98 X10 (3) UL (ref 1.5–7.7)
NEUTROPHILS # BLD AUTO: 2.98 X10(3) UL (ref 1.5–7.7)
NEUTROPHILS NFR BLD AUTO: 47.7 %
OSMOLALITY SERPL CALC.SUM OF ELEC: 290 MOSM/KG (ref 275–295)
PLATELET # BLD AUTO: 138 10(3)UL (ref 150–450)
POTASSIUM SERPL-SCNC: 3.9 MMOL/L (ref 3.5–5.1)
RBC # BLD AUTO: 3.18 X10(6)UL (ref 3.8–5.3)
SODIUM SERPL-SCNC: 141 MMOL/L (ref 136–145)
WBC # BLD AUTO: 6.2 X10(3) UL (ref 4–11)

## 2019-03-18 PROCEDURE — 99152 MOD SED SAME PHYS/QHP 5/>YRS: CPT | Performed by: ORTHOPAEDIC SURGERY

## 2019-03-18 PROCEDURE — 0RRM0JZ REPLACEMENT OF LEFT ELBOW JOINT WITH SYNTHETIC SUBSTITUTE, OPEN APPROACH: ICD-10-PCS | Performed by: ORTHOPAEDIC SURGERY

## 2019-03-18 PROCEDURE — 73070 X-RAY EXAM OF ELBOW: CPT | Performed by: ORTHOPAEDIC SURGERY

## 2019-03-18 PROCEDURE — 85025 COMPLETE CBC W/AUTO DIFF WBC: CPT | Performed by: INTERNAL MEDICINE

## 2019-03-18 PROCEDURE — 88305 TISSUE EXAM BY PATHOLOGIST: CPT | Performed by: ORTHOPAEDIC SURGERY

## 2019-03-18 PROCEDURE — 88311 DECALCIFY TISSUE: CPT | Performed by: ORTHOPAEDIC SURGERY

## 2019-03-18 PROCEDURE — 76942 ECHO GUIDE FOR BIOPSY: CPT | Performed by: ORTHOPAEDIC SURGERY

## 2019-03-18 PROCEDURE — 64415 NJX AA&/STRD BRCH PLXS IMG: CPT | Performed by: ORTHOPAEDIC SURGERY

## 2019-03-18 PROCEDURE — 80048 BASIC METABOLIC PNL TOTAL CA: CPT | Performed by: INTERNAL MEDICINE

## 2019-03-18 DEVICE — IMPLANTABLE DEVICE: Type: IMPLANTABLE DEVICE | Site: ELBOW | Status: FUNCTIONAL

## 2019-03-18 DEVICE — CEMENT BONE ZIM PALACOS LVG: Type: IMPLANTABLE DEVICE | Site: ELBOW | Status: FUNCTIONAL

## 2019-03-18 RX ORDER — SODIUM CHLORIDE, SODIUM LACTATE, POTASSIUM CHLORIDE, CALCIUM CHLORIDE 600; 310; 30; 20 MG/100ML; MG/100ML; MG/100ML; MG/100ML
INJECTION, SOLUTION INTRAVENOUS CONTINUOUS
Status: DISCONTINUED | OUTPATIENT
Start: 2019-03-18 | End: 2019-03-18 | Stop reason: HOSPADM

## 2019-03-18 RX ORDER — ROPIVACAINE HYDROCHLORIDE 5 MG/ML
INJECTION, SOLUTION EPIDURAL; INFILTRATION; PERINEURAL AS NEEDED
Status: DISCONTINUED | OUTPATIENT
Start: 2019-03-18 | End: 2019-03-18 | Stop reason: SURG

## 2019-03-18 RX ORDER — ASPIRIN 325 MG
325 TABLET ORAL 2 TIMES DAILY
Status: DISCONTINUED | OUTPATIENT
Start: 2019-03-18 | End: 2019-03-20

## 2019-03-18 RX ORDER — OXYCODONE HYDROCHLORIDE 5 MG/1
5 TABLET ORAL EVERY 4 HOURS PRN
Status: DISPENSED | OUTPATIENT
Start: 2019-03-18 | End: 2019-03-19

## 2019-03-18 RX ORDER — HALOPERIDOL 5 MG/ML
0.25 INJECTION INTRAMUSCULAR ONCE AS NEEDED
Status: DISCONTINUED | OUTPATIENT
Start: 2019-03-18 | End: 2019-03-18 | Stop reason: HOSPADM

## 2019-03-18 RX ORDER — POLYETHYLENE GLYCOL 3350 17 G/17G
17 POWDER, FOR SOLUTION ORAL DAILY PRN
Status: DISCONTINUED | OUTPATIENT
Start: 2019-03-18 | End: 2019-03-20

## 2019-03-18 RX ORDER — DOCUSATE SODIUM 100 MG/1
100 CAPSULE, LIQUID FILLED ORAL 2 TIMES DAILY
Status: DISCONTINUED | OUTPATIENT
Start: 2019-03-18 | End: 2019-03-20

## 2019-03-18 RX ORDER — SODIUM CHLORIDE 0.9 % (FLUSH) 0.9 %
10 SYRINGE (ML) INJECTION AS NEEDED
Status: DISCONTINUED | OUTPATIENT
Start: 2019-03-18 | End: 2019-03-20

## 2019-03-18 RX ORDER — MORPHINE SULFATE 2 MG/ML
2 INJECTION, SOLUTION INTRAMUSCULAR; INTRAVENOUS EVERY 2 HOUR PRN
Status: ACTIVE | OUTPATIENT
Start: 2019-03-18 | End: 2019-03-19

## 2019-03-18 RX ORDER — OXYCODONE HYDROCHLORIDE 5 MG/1
10 TABLET ORAL EVERY 4 HOURS PRN
Status: DISPENSED | OUTPATIENT
Start: 2019-03-18 | End: 2019-03-19

## 2019-03-18 RX ORDER — DEXAMETHASONE SODIUM PHOSPHATE 10 MG/ML
INJECTION, SOLUTION INTRAMUSCULAR; INTRAVENOUS AS NEEDED
Status: DISCONTINUED | OUTPATIENT
Start: 2019-03-18 | End: 2019-03-18 | Stop reason: SURG

## 2019-03-18 RX ORDER — ONDANSETRON 2 MG/ML
4 INJECTION INTRAMUSCULAR; INTRAVENOUS EVERY 6 HOURS PRN
Status: DISCONTINUED | OUTPATIENT
Start: 2019-03-18 | End: 2019-03-20

## 2019-03-18 RX ORDER — FAMOTIDINE 20 MG/1
20 TABLET ORAL 2 TIMES DAILY
Status: DISCONTINUED | OUTPATIENT
Start: 2019-03-18 | End: 2019-03-20

## 2019-03-18 RX ORDER — SODIUM CHLORIDE, SODIUM LACTATE, POTASSIUM CHLORIDE, CALCIUM CHLORIDE 600; 310; 30; 20 MG/100ML; MG/100ML; MG/100ML; MG/100ML
INJECTION, SOLUTION INTRAVENOUS CONTINUOUS PRN
Status: DISCONTINUED | OUTPATIENT
Start: 2019-03-18 | End: 2019-03-18 | Stop reason: SURG

## 2019-03-18 RX ORDER — MORPHINE SULFATE 4 MG/ML
4 INJECTION, SOLUTION INTRAMUSCULAR; INTRAVENOUS EVERY 2 HOUR PRN
Status: DISCONTINUED | OUTPATIENT
Start: 2019-03-18 | End: 2019-03-20

## 2019-03-18 RX ORDER — ACETAMINOPHEN 500 MG
1000 TABLET ORAL EVERY 8 HOURS
Status: DISPENSED | OUTPATIENT
Start: 2019-03-18 | End: 2019-03-19

## 2019-03-18 RX ORDER — HYDROCODONE BITARTRATE AND ACETAMINOPHEN 7.5; 325 MG/1; MG/1
1 TABLET ORAL EVERY 4 HOURS PRN
Status: DISCONTINUED | OUTPATIENT
Start: 2019-03-18 | End: 2019-03-20

## 2019-03-18 RX ORDER — MAGNESIUM HYDROXIDE 1200 MG/15ML
LIQUID ORAL CONTINUOUS PRN
Status: COMPLETED | OUTPATIENT
Start: 2019-03-18 | End: 2019-03-18

## 2019-03-18 RX ORDER — SODIUM PHOSPHATE, DIBASIC AND SODIUM PHOSPHATE, MONOBASIC 7; 19 G/133ML; G/133ML
1 ENEMA RECTAL ONCE AS NEEDED
Status: DISCONTINUED | OUTPATIENT
Start: 2019-03-18 | End: 2019-03-20

## 2019-03-18 RX ORDER — CEFAZOLIN SODIUM/WATER 2 G/20 ML
SYRINGE (ML) INTRAVENOUS AS NEEDED
Status: DISCONTINUED | OUTPATIENT
Start: 2019-03-18 | End: 2019-03-18 | Stop reason: SURG

## 2019-03-18 RX ORDER — FAMOTIDINE 10 MG/ML
20 INJECTION, SOLUTION INTRAVENOUS 2 TIMES DAILY
Status: DISCONTINUED | OUTPATIENT
Start: 2019-03-18 | End: 2019-03-20

## 2019-03-18 RX ORDER — EPHEDRINE SULFATE 50 MG/ML
INJECTION, SOLUTION INTRAVENOUS AS NEEDED
Status: DISCONTINUED | OUTPATIENT
Start: 2019-03-18 | End: 2019-03-18 | Stop reason: SURG

## 2019-03-18 RX ORDER — NALOXONE HYDROCHLORIDE 0.4 MG/ML
80 INJECTION, SOLUTION INTRAMUSCULAR; INTRAVENOUS; SUBCUTANEOUS AS NEEDED
Status: DISCONTINUED | OUTPATIENT
Start: 2019-03-18 | End: 2019-03-18 | Stop reason: HOSPADM

## 2019-03-18 RX ORDER — METOPROLOL TARTRATE 5 MG/5ML
2.5 INJECTION INTRAVENOUS ONCE
Status: DISCONTINUED | OUTPATIENT
Start: 2019-03-18 | End: 2019-03-18 | Stop reason: HOSPADM

## 2019-03-18 RX ORDER — MIDAZOLAM HYDROCHLORIDE 1 MG/ML
INJECTION INTRAMUSCULAR; INTRAVENOUS AS NEEDED
Status: DISCONTINUED | OUTPATIENT
Start: 2019-03-18 | End: 2019-03-18 | Stop reason: SURG

## 2019-03-18 RX ORDER — LIDOCAINE HYDROCHLORIDE 10 MG/ML
INJECTION, SOLUTION EPIDURAL; INFILTRATION; INTRACAUDAL; PERINEURAL AS NEEDED
Status: DISCONTINUED | OUTPATIENT
Start: 2019-03-18 | End: 2019-03-18 | Stop reason: SURG

## 2019-03-18 RX ORDER — MORPHINE SULFATE 2 MG/ML
1 INJECTION, SOLUTION INTRAMUSCULAR; INTRAVENOUS EVERY 2 HOUR PRN
Status: DISCONTINUED | OUTPATIENT
Start: 2019-03-18 | End: 2019-03-20

## 2019-03-18 RX ORDER — MORPHINE SULFATE 4 MG/ML
6 INJECTION, SOLUTION INTRAMUSCULAR; INTRAVENOUS EVERY 2 HOUR PRN
Status: ACTIVE | OUTPATIENT
Start: 2019-03-18 | End: 2019-03-19

## 2019-03-18 RX ORDER — CEFAZOLIN SODIUM/WATER 2 G/20 ML
2 SYRINGE (ML) INTRAVENOUS EVERY 8 HOURS
Status: COMPLETED | OUTPATIENT
Start: 2019-03-18 | End: 2019-03-19

## 2019-03-18 RX ORDER — BISACODYL 10 MG
10 SUPPOSITORY, RECTAL RECTAL
Status: DISCONTINUED | OUTPATIENT
Start: 2019-03-18 | End: 2019-03-20

## 2019-03-18 RX ORDER — MORPHINE SULFATE 4 MG/ML
4 INJECTION, SOLUTION INTRAMUSCULAR; INTRAVENOUS EVERY 2 HOUR PRN
Status: ACTIVE | OUTPATIENT
Start: 2019-03-18 | End: 2019-03-19

## 2019-03-18 RX ORDER — ONDANSETRON 2 MG/ML
4 INJECTION INTRAMUSCULAR; INTRAVENOUS ONCE AS NEEDED
Status: DISCONTINUED | OUTPATIENT
Start: 2019-03-18 | End: 2019-03-18 | Stop reason: HOSPADM

## 2019-03-18 RX ORDER — ONDANSETRON 2 MG/ML
INJECTION INTRAMUSCULAR; INTRAVENOUS AS NEEDED
Status: DISCONTINUED | OUTPATIENT
Start: 2019-03-18 | End: 2019-03-18 | Stop reason: SURG

## 2019-03-18 RX ORDER — MORPHINE SULFATE 15 MG/1
15 TABLET ORAL EVERY 4 HOURS PRN
Status: ACTIVE | OUTPATIENT
Start: 2019-03-18 | End: 2019-03-19

## 2019-03-18 RX ORDER — MORPHINE SULFATE 2 MG/ML
2 INJECTION, SOLUTION INTRAMUSCULAR; INTRAVENOUS EVERY 2 HOUR PRN
Status: DISCONTINUED | OUTPATIENT
Start: 2019-03-18 | End: 2019-03-20

## 2019-03-18 RX ADMIN — LIDOCAINE HYDROCHLORIDE 50 MG: 10 INJECTION, SOLUTION EPIDURAL; INFILTRATION; INTRACAUDAL; PERINEURAL at 10:34:00

## 2019-03-18 RX ADMIN — SODIUM CHLORIDE: 9 INJECTION, SOLUTION INTRAVENOUS at 12:50:00

## 2019-03-18 RX ADMIN — ROPIVACAINE HYDROCHLORIDE 20 ML: 5 INJECTION, SOLUTION EPIDURAL; INFILTRATION; PERINEURAL at 09:57:00

## 2019-03-18 RX ADMIN — ONDANSETRON 4 MG: 2 INJECTION INTRAMUSCULAR; INTRAVENOUS at 12:38:00

## 2019-03-18 RX ADMIN — SODIUM CHLORIDE: 9 INJECTION, SOLUTION INTRAVENOUS at 11:25:00

## 2019-03-18 RX ADMIN — CEFAZOLIN SODIUM/WATER 2 G: 2 G/20 ML SYRINGE (ML) INTRAVENOUS at 10:45:00

## 2019-03-18 RX ADMIN — MIDAZOLAM HYDROCHLORIDE 1 MG: 1 INJECTION INTRAMUSCULAR; INTRAVENOUS at 09:49:00

## 2019-03-18 RX ADMIN — DEXAMETHASONE SODIUM PHOSPHATE 4 MG: 10 INJECTION, SOLUTION INTRAMUSCULAR; INTRAVENOUS at 09:57:00

## 2019-03-18 RX ADMIN — EPHEDRINE SULFATE 5 MG: 50 INJECTION, SOLUTION INTRAVENOUS at 11:28:00

## 2019-03-18 RX ADMIN — EPHEDRINE SULFATE 10 MG: 50 INJECTION, SOLUTION INTRAVENOUS at 10:51:00

## 2019-03-18 RX ADMIN — LIDOCAINE HYDROCHLORIDE 3 ML: 10 INJECTION, SOLUTION EPIDURAL; INFILTRATION; INTRACAUDAL; PERINEURAL at 09:52:00

## 2019-03-18 RX ADMIN — SODIUM CHLORIDE, SODIUM LACTATE, POTASSIUM CHLORIDE, CALCIUM CHLORIDE: 600; 310; 30; 20 INJECTION, SOLUTION INTRAVENOUS at 10:33:00

## 2019-03-18 NOTE — OPERATIVE REPORT
Operative Note    Patient Name: James Quintanilla    Preoperative Diagnosis: Left elbow fracture, closed, initial encounter [S42.402A]    Postoperative Diagnosis: Left elbow fracture, closed, initial encounter Yesenia Chadwick    Primary Surgeon: Nicanor Garcia MD

## 2019-03-18 NOTE — ANESTHESIA PROCEDURE NOTES
Airway  Date/Time: 3/18/2019 10:37 AM  Urgency: elective      General Information and Staff    Patient location during procedure: OR  Anesthesiologist: Ayah Alcantara DO  Resident/CRNA: Jono Oneill CRNA  Performed: CRNA     Indications and Patient Jackie Hummel

## 2019-03-18 NOTE — ANESTHESIA PREPROCEDURE EVALUATION
Anesthesia PreOp Note    HPI:     Mere Avilez is a 72year old female who presents for preoperative consultation requested by: Lakshmi Colin MD    Date of Surgery: 3/15/2019 - 3/18/2019    Procedure(s):  ELBOW TOTAL REPLACEMENT  Indication: Left elbow Medications Prior to Admission:  aspirin tab 325 mg 325 mg Oral Daily José Antonio Guillen MD, PhD     Medications Prior to Admission:  atorvastatin 40 MG Oral Tab Take 40 mg by mouth After dinner.  Disp:  Rfl:  3/14/2019 at 1700   docusate sodium 100 MG Oral Ca Prochlorperazine Edisylate (COMPAZINE) injection 5 mg 5 mg Intravenous Once PRN Dorethea Mons, DO    haloperidol lactate (HALDOL) 5 MG/ML injection 0.25 mg 0.25 mg Intravenous Once PRN Dorethea Mons, DO    Naloxone HCl (NARCAN) 0.4 MG/ML injection 80 mcg History    Socioeconomic History      Marital status:       Spouse name: Not on file      Number of children: Not on file      Years of education: Not on file      Highest education level: Not on file    Occupational History      Not on file    Soci live in a home with stairs. Available pre-op labs reviewed.   Lab Results   Component Value Date    WBC 6.2 03/18/2019    RBC 3.18 (L) 03/18/2019    HGB 10.1 (L) 03/18/2019    HCT 30.6 (L) 03/18/2019    MCV 96.2 03/18/2019    MCH 31.8 03/18/2019    MCH the proposed regional anesthetic as planned.   Informed Consent Plan and Risks Discussed With:  Patient  Discussed plan with:  CRNA      I have informed Mere Avilez and/or legal guardian or family member of the nature of the anesthetic plan, benefits, risks

## 2019-03-18 NOTE — OPERATIVE REPORT
[facility]    PATIENT'S NAME: TEX HERNANDEZ   ATTENDING PHYSICIAN: Holley Chapman MD   OPERATING PHYSICIAN: Hermilo Raza MD   PATIENT ACCOUNT#:   [id number]    LOCATION:  [facility] [address] PACU 5 [facility] 10  MEDICAL RECORD #:   [id number]       DATE [place] Pod draping, the extremity was exsanguinated. The tourniquet was inflated to 250 mmHg. Next, a posterior approach to the elbow was performed through a 6 to 8- inch incision centered over the olecranon fossa and subcutaneous border of the ulna.   Sharp dissect These were Sweetie implants. Prior to cementing, we placed the more proximal polyethylene bearing on the humeral side. We then constructed and fashioned the polyethylene around the ulnar implant.   We then linked the ulnar implant to the humeral implant an

## 2019-03-18 NOTE — PLAN OF CARE
Plan for sx this AM. [employer] called about loop recorder implant. No magnet recommended. Must maintain 6 inch distance between any cautery tip and device.

## 2019-03-18 NOTE — ANESTHESIA POSTPROCEDURE EVALUATION
Patient: Mere Avilez    Procedure Summary     Date:  03/18/19 Room / Location:  [facility] OR  / [facility] OR    Anesthesia Start:  0668 Anesthesia Stop:  1368    Procedure:  ELBOW TOTAL REPLACEMENT (Left Elbow) Diagnosis:       Left elbow fracture, closed,

## 2019-03-18 NOTE — ANESTHESIA PROCEDURE NOTES
Peripheral Block    Anesthesiologist:  Richa Rainey DO  Performed by:   Anesthesiologist  Patient Location:  PACU  Start Time:  3/18/2019 9:47 AM  End Time:  3/18/2019 9:57 AM  Site Identification: ultrasound guided, real time ultrasound guided and IMAGE

## 2019-03-19 LAB
ANION GAP SERPL CALC-SCNC: 5 MMOL/L (ref 0–18)
BASOPHILS # BLD AUTO: 0.01 X10(3) UL (ref 0–0.2)
BASOPHILS NFR BLD AUTO: 0.1 %
BUN BLD-MCNC: 11 MG/DL (ref 7–18)
BUN/CREAT SERPL: 15.7 (ref 10–20)
CALCIUM BLD-MCNC: 7.8 MG/DL (ref 8.5–10.1)
CHLORIDE SERPL-SCNC: 109 MMOL/L (ref 98–107)
CO2 SERPL-SCNC: 25 MMOL/L (ref 21–32)
CREAT BLD-MCNC: 0.7 MG/DL (ref 0.55–1.02)
DEPRECATED RDW RBC AUTO: 42.8 FL (ref 35.1–46.3)
EOSINOPHIL # BLD AUTO: 0.02 X10(3) UL (ref 0–0.7)
EOSINOPHIL NFR BLD AUTO: 0.2 %
ERYTHROCYTE [DISTWIDTH] IN BLOOD BY AUTOMATED COUNT: 12.5 % (ref 11–15)
GLUCOSE BLD-MCNC: 102 MG/DL (ref 70–99)
HCT VFR BLD AUTO: 25.1 % (ref 35–48)
HGB BLD-MCNC: 8.5 G/DL (ref 12–16)
IMM GRANULOCYTES # BLD AUTO: 0.03 X10(3) UL (ref 0–1)
IMM GRANULOCYTES NFR BLD: 0.3 %
LYMPHOCYTES # BLD AUTO: 1.58 X10(3) UL (ref 1–4)
LYMPHOCYTES NFR BLD AUTO: 18 %
MCH RBC QN AUTO: 32.2 PG (ref 26–34)
MCHC RBC AUTO-ENTMCNC: 33.9 G/DL (ref 31–37)
MCV RBC AUTO: 95.1 FL (ref 80–100)
MONOCYTES # BLD AUTO: 0.93 X10(3) UL (ref 0.1–1)
MONOCYTES NFR BLD AUTO: 10.6 %
NEUTROPHILS # BLD AUTO: 6.2 X10 (3) UL (ref 1.5–7.7)
NEUTROPHILS # BLD AUTO: 6.2 X10(3) UL (ref 1.5–7.7)
NEUTROPHILS NFR BLD AUTO: 70.8 %
OSMOLALITY SERPL CALC.SUM OF ELEC: 288 MOSM/KG (ref 275–295)
PLATELET # BLD AUTO: 120 10(3)UL (ref 150–450)
POTASSIUM SERPL-SCNC: 3.8 MMOL/L (ref 3.5–5.1)
RBC # BLD AUTO: 2.64 X10(6)UL (ref 3.8–5.3)
SODIUM SERPL-SCNC: 139 MMOL/L (ref 136–145)
WBC # BLD AUTO: 8.8 X10(3) UL (ref 4–11)

## 2019-03-19 PROCEDURE — 85025 COMPLETE CBC W/AUTO DIFF WBC: CPT | Performed by: INTERNAL MEDICINE

## 2019-03-19 PROCEDURE — 80048 BASIC METABOLIC PNL TOTAL CA: CPT | Performed by: INTERNAL MEDICINE

## 2019-03-19 RX ORDER — POTASSIUM CHLORIDE 20 MEQ/1
40 TABLET, EXTENDED RELEASE ORAL ONCE
Status: COMPLETED | OUTPATIENT
Start: 2019-03-19 | End: 2019-03-19

## 2019-03-19 RX ORDER — MIDODRINE HYDROCHLORIDE 5 MG/1
5 TABLET ORAL 3 TIMES DAILY
Status: DISCONTINUED | OUTPATIENT
Start: 2019-03-19 | End: 2019-03-20

## 2019-03-19 NOTE — PHYSICAL THERAPY NOTE
Chart reviewed    RN initially approving participation . Therapy arriving on unit , RN with pt in room , they had just attempted with RN staff to get pt up to rolling chair for suzette ---pt with symptomatic low BP / orthostasis with RN staff  .     Pt

## 2019-03-19 NOTE — ANESTHESIA POST-OP FOLLOW-UP NOTE
[facility] - [facility]   Acute Pain Rounds Note  3/19/2019    Patient name: Krunal Wise 72year old female  : 1945  MRN: [id number]    Diagnosis: (S42.402A) Left elbow fracture, closed, initial encounter  (primary encounter diagnosis)    S/P: Ana José

## 2019-03-19 NOTE — PHYSICAL THERAPY NOTE
PHYSICAL THERAPY EVALUATION - INPATIENT     Room Number: 148/549-W  Evaluation Date: 3/19/2019  Type of Evaluation: Initial   Physician Order: PT Eval and Treat    Presenting Problem: fall with left distal humeral fx . pt is s/p left total elbow arthropla with need to sit at EOB . After seated rest break at EOB , therapy able to progress pt to chair with SPT no AD used gait belt. Pt once in chair with stable vitals but onset of dry heeves and nausea. RN Notified of pt symptoms and vitals reported. current admission:     From ortho MD follow up note       2.7 (H) 03/06/2017      Assessment: Postoperative day # 1 S/p left total elbow arthroplasty.      Plan:       Hgb 8.5 today. Hgb 10.1 yesterday. Continue to monitor Hgb.  Recheck CBC in AM.   Geovani Zuniga CYST(S)  12/2014       HOME SITUATION  Type of Home: House   Home Layout: Two level  Stairs to Enter : 4  Railing: Yes  Stairs to Bedroom: 13  Railing: Yes    Lives With: Spouse  Drives: No  Patient Owned Equipment: Rolling walker;Cane(shower chair )  [facility] HR  69 O2 sats 95 %   Sitting  131/57 HR 67 O2 sats 95 %  No symptoms   Standing BP onset of symptoms  BP  99/51  Need to sit     Sitting EOB with symptoms decrease  /57 progressed to chair   In chair after activity BP  151/54  HR 65 O2 sats 99 %   O Current Status    Goal #4 Patient will negotiate  15 stairs/one curb w/ assistive device and supervision   Goal #4   Current Status    Goal #5 Patient to verbalize understanding of education provided and improved compliance with precautions and safety wi

## 2019-03-19 NOTE — PROGRESS NOTES
[facility]    Progress Note  [facility]    [facility] Patient Status:  Inpatient    1945 MRN [id number]   Location [facility] 4W/SW/SE Attending Leeanne Collier MD   Hosp Day # 3 PCP Katey Littlejohn MD       As sulfate (PF) 2 MG/ML injection 2 mg, 2 mg, Intravenous, Q2H PRN **OR** morphINE sulfate (PF) 4 MG/ML injection 4 mg, 4 mg, Intravenous, Q2H PRN  •  ondansetron HCl (ZOFRAN) injection 4 mg, 4 mg, Intravenous, Q6H PRN  •  famoTIDine (PEPCID) tab 20 mg, 20 mg 03/18/2019    HGB 10.1 (L) 03/18/2019    HCT 30.6 (L) 03/18/2019    .0 (L) 03/18/2019    CREATSERUM 0.66 03/18/2019    BUN 9 03/18/2019     03/18/2019    K 3.9 03/18/2019     (H) 03/18/2019    CO2 25.0 03/18/2019    GLU 94 03/18/2019

## 2019-03-19 NOTE — OCCUPATIONAL THERAPY NOTE
Consult received for OT evaluation. Attempted to see pt. Betty Jesus stated pt very orthostatic with sitting up, unable to tolerate OOB activity. Will re-attempt when medically stable and able to participate in therapy.      Steven Gunn MA, OTR/L  Occ

## 2019-03-19 NOTE — PROGRESS NOTES
Subjective: Poor pain control yesterday and last night. Pain controlled currently with Oxycodone IR 10 mg. Reports some lightheadedness and dizziness with standing/walking. Orthostatic hypotension issues per RN.  Being managed by medical team. Denies chest 03/19/2019    RDW 12.5 03/19/2019    MPV 6.5 (L) 10/23/2018     Lab Results   Component Value Date    INR 2.7 (H) 03/06/2017     Assessment: Postoperative day # 1 S/p left total elbow arthroplasty. Plan:      Hgb 8.5 today. Hgb 10.1 yesterday.  Continue

## 2019-03-20 VITALS
RESPIRATION RATE: 18 BRPM | DIASTOLIC BLOOD PRESSURE: 43 MMHG | WEIGHT: 146.13 LBS | OXYGEN SATURATION: 100 % | SYSTOLIC BLOOD PRESSURE: 103 MMHG | TEMPERATURE: 98 F | HEART RATE: 76 BPM | HEIGHT: 65 IN | BODY MASS INDEX: 24.35 KG/M2

## 2019-03-20 LAB
ANION GAP SERPL CALC-SCNC: 6 MMOL/L (ref 0–18)
BASOPHILS # BLD AUTO: 0.04 X10(3) UL (ref 0–0.2)
BASOPHILS NFR BLD AUTO: 0.6 %
BUN BLD-MCNC: 9 MG/DL (ref 7–18)
BUN/CREAT SERPL: 14.1 (ref 10–20)
CALCIUM BLD-MCNC: 8.5 MG/DL (ref 8.5–10.1)
CHLORIDE SERPL-SCNC: 110 MMOL/L (ref 98–107)
CO2 SERPL-SCNC: 26 MMOL/L (ref 21–32)
CREAT BLD-MCNC: 0.64 MG/DL (ref 0.55–1.02)
DEPRECATED RDW RBC AUTO: 45.2 FL (ref 35.1–46.3)
EOSINOPHIL # BLD AUTO: 0.19 X10(3) UL (ref 0–0.7)
EOSINOPHIL NFR BLD AUTO: 2.7 %
ERYTHROCYTE [DISTWIDTH] IN BLOOD BY AUTOMATED COUNT: 12.8 % (ref 11–15)
GLUCOSE BLD-MCNC: 110 MG/DL (ref 70–99)
HCT VFR BLD AUTO: 25.8 % (ref 35–48)
HGB BLD-MCNC: 8.5 G/DL (ref 12–16)
IMM GRANULOCYTES # BLD AUTO: 0.02 X10(3) UL (ref 0–1)
IMM GRANULOCYTES NFR BLD: 0.3 %
IRON SATURATION: 6 % (ref 15–50)
IRON SERPL-MCNC: 15 UG/DL (ref 50–170)
LYMPHOCYTES # BLD AUTO: 1.59 X10(3) UL (ref 1–4)
LYMPHOCYTES NFR BLD AUTO: 23 %
MCH RBC QN AUTO: 32.2 PG (ref 26–34)
MCHC RBC AUTO-ENTMCNC: 32.9 G/DL (ref 31–37)
MCV RBC AUTO: 97.7 FL (ref 80–100)
MONOCYTES # BLD AUTO: 0.73 X10(3) UL (ref 0.1–1)
MONOCYTES NFR BLD AUTO: 10.5 %
NEUTROPHILS # BLD AUTO: 4.35 X10 (3) UL (ref 1.5–7.7)
NEUTROPHILS # BLD AUTO: 4.35 X10(3) UL (ref 1.5–7.7)
NEUTROPHILS NFR BLD AUTO: 62.9 %
OSMOLALITY SERPL CALC.SUM OF ELEC: 293 MOSM/KG (ref 275–295)
PLATELET # BLD AUTO: 143 10(3)UL (ref 150–450)
POTASSIUM SERPL-SCNC: 3.9 MMOL/L (ref 3.5–5.1)
RBC # BLD AUTO: 2.64 X10(6)UL (ref 3.8–5.3)
SODIUM SERPL-SCNC: 142 MMOL/L (ref 136–145)
TOTAL IRON BINDING CAPACITY: 253 UG/DL (ref 240–450)
TRANSFERRIN SERPL-MCNC: 170 MG/DL (ref 200–360)
VIT B12 SERPL-MCNC: 477 PG/ML (ref 193–986)
WBC # BLD AUTO: 6.9 X10(3) UL (ref 4–11)

## 2019-03-20 PROCEDURE — 97166 OT EVAL MOD COMPLEX 45 MIN: CPT

## 2019-03-20 PROCEDURE — 80048 BASIC METABOLIC PNL TOTAL CA: CPT | Performed by: INTERNAL MEDICINE

## 2019-03-20 PROCEDURE — 84466 ASSAY OF TRANSFERRIN: CPT | Performed by: INTERNAL MEDICINE

## 2019-03-20 PROCEDURE — 83540 ASSAY OF IRON: CPT | Performed by: INTERNAL MEDICINE

## 2019-03-20 PROCEDURE — 85025 COMPLETE CBC W/AUTO DIFF WBC: CPT | Performed by: INTERNAL MEDICINE

## 2019-03-20 PROCEDURE — 97530 THERAPEUTIC ACTIVITIES: CPT

## 2019-03-20 PROCEDURE — 97535 SELF CARE MNGMENT TRAINING: CPT

## 2019-03-20 PROCEDURE — 97116 GAIT TRAINING THERAPY: CPT

## 2019-03-20 PROCEDURE — 82607 VITAMIN B-12: CPT | Performed by: INTERNAL MEDICINE

## 2019-03-20 RX ORDER — MIDODRINE HYDROCHLORIDE 5 MG/1
10 TABLET ORAL 3 TIMES DAILY
Status: DISCONTINUED | OUTPATIENT
Start: 2019-03-20 | End: 2019-03-20

## 2019-03-20 RX ORDER — MIDODRINE HYDROCHLORIDE 10 MG/1
10 TABLET ORAL 3 TIMES DAILY
Qty: 90 TABLET | Refills: 1 | Status: SHIPPED | OUTPATIENT
Start: 2019-03-20 | End: 2019-04-19

## 2019-03-20 RX ORDER — HYDROCODONE BITARTRATE AND ACETAMINOPHEN 7.5; 325 MG/1; MG/1
1 TABLET ORAL EVERY 4 HOURS PRN
Qty: 20 TABLET | Refills: 0 | Status: SHIPPED | OUTPATIENT
Start: 2019-03-20 | End: 2020-11-03

## 2019-03-20 NOTE — PROGRESS NOTES
[facility]    Progress Note  [facility]    Floirnda Aggarwal Patient Status:  Inpatient    1945 MRN [id number]   Location [facility] 4W//SE Attending Pauly Washburn MD   Hosp Day # 5 PCP Elena Moses MD       As injection 1 mg, 1 mg, Intravenous, Q2H PRN **OR** morphINE sulfate (PF) 2 MG/ML injection 2 mg, 2 mg, Intravenous, Q2H PRN **OR** morphINE sulfate (PF) 4 MG/ML injection 4 mg, 4 mg, Intravenous, Q2H PRN  •  ondansetron HCl (ZOFRAN) injection 4 mg, 4 mg, In 03/20/2019    BUN 9 03/20/2019     03/20/2019    K 3.9 03/20/2019     (H) 03/20/2019    CO2 26.0 03/20/2019     (H) 03/20/2019    CA 8.5 03/20/2019    ALB 4.1 10/13/2018    ALKPHO 64 10/13/2018    BILT 0.7 10/13/2018    TP 7.3 10/13/2018

## 2019-03-20 NOTE — PROGRESS NOTES
Subjective: No complaints. Pain controlled.     Objective:    Patient Vitals for the past 24 hrs:   BP Temp Temp src Pulse Resp SpO2   03/20/19 1251 103/43 97.9 °F (36.6 °C) Oral 76 18 100 %   03/20/19 0842 123/50 98.9 °F (37.2 °C) Oral — 16 96 %   03/20/19

## 2019-03-20 NOTE — DISCHARGE SUMMARY
[facility]    Discharge Summary    Whit Mak Patient Status:  Inpatient    1945 MRN [id number]   Location [facility] 4W/SW/SE Attending Sabine Metz MD   Hosp Day # 5 PCP Grace Reyes MD     Date of Admission: 3/15/ monitor blood pressure at home and see Dr. Anita Macdonald at the office next week. Patient will wear long compression hose.         Discharge Condition:    Improved    Discharge Medications:      Discharge Medications      START taking these medications      Instru Visits:  Follow-up with ortho as instructed, with Dr. Alejandro No next week    Other Discharge Instructions:  Monitor BP at home closely    Marcelo Baum MD  3/20/2019  3:30 PM

## 2019-03-20 NOTE — PROGRESS NOTES
[facility]    Progress Note  [facility]    Ethan Sommer Patient Status:  Inpatient    1945 MRN [id number]   Location [facility] 4W/SW/SE Attending Jeremiah Lopez MD   Hosp Day # 4 PCP Varghese Moran MD       As tab 81 mg, 81 mg, Oral, Daily  •  atorvastatin (LIPITOR) tab 40 mg, 40 mg, Oral, After dinner  •  vitamin B-Complex with C tab, 1 tablet, Oral, Daily  •  Calcium Carbonate-Vitamin D (OSCAL-500) 500-200 MG-UNIT per tab 1 tablet, 1 tablet, Oral, Daily  •  fe appears well seated in the femoral shaft and proximal ulna. * There is soft tissue swelling. * Skin staples are noted. * Images were available for review. Dictated by (CST): Obdulia Cabrera MD on 3/18/2019 at 15:29     Approved by (CST):  Zoran Florse,

## 2019-03-20 NOTE — OCCUPATIONAL THERAPY NOTE
OCCUPATIONAL THERAPY EVALUATION - INPATIENT      Room Number: 709/966-U  Evaluation Date: 3/20/2019  Type of Evaluation: Initial  Presenting Problem: (fall with LT elbow fx. s/p TEA, syncope/orthostatic BP)    Physician Order: IP Consult to Occupational Th chair, pull on pants, loose,frnt opening shirt)    PLAN  OT Treatment Plan: ADL training;Functional transfer training;Patient/Family training;Patient/Family education;Equipment eval/education; Compensatory technique education       OCCUPATIONAL THERAPY MEDI Bearing Restriction: L upper extremity     L Upper Extremity: Non-Weight Bearing          PAIN ASSESSMENT  Rating: (pt indicates minimal LT UE pain)  Location: (LT UE)  Management Techniques: Repositioning    ACTIVITY TOLERANCE  Pt tolerates ~30 minutes EO

## 2019-03-20 NOTE — PHYSICAL THERAPY NOTE
PHYSICAL THERAPY TREATMENT NOTE - INPATIENT     Room Number: 386/061-C       Presenting Problem: fall with left distal humeral fx . pt is s/p left total elbow arthroplasty .   pt with hx of postural hypotension with syncope , this exacerbated post sx .  per with stable vitals. Pt left up in chair , needs within reach , report to RN . OT informed of low BP with attempt at standing. PT will follow up this PM in attempt to progress mobility . PM session : RN inform therapy pt going home today .       [place] rails as needed for d/c for increased independence. Pt able to amb with and without SPC total distance of 300 ft using gait belt with SBA to CGA provided for safety and fall prevention .  Pt denies any symptoms, no LOB observed , and pt reports prefers not confirms to provide 24 hr support and assist at d/c and denies concern for d/c to home when pt medically appropriate.       DISCHARGE RECOMMENDATIONS  PT Discharge Recommendations: 24 hour care/supervision;Home(discussed Home health  pt and family declining chair with arms (e.g., wheelchair, bedside commode, etc.): A Little   -   Moving from lying on back to sitting on the side of the bed?: A Little   How much help from another person does the patient currently need. ..   -   Moving to and from a bed to a mary #6     Goal #6  Current Status

## 2019-04-01 ENCOUNTER — LAB ENCOUNTER (OUTPATIENT)
Dept: LAB | Facility: HOSPITAL | Age: 74
End: 2019-04-01
Attending: INTERNAL MEDICINE
Payer: MEDICARE

## 2019-04-01 DIAGNOSIS — E53.8 VITAMIN B12 DEFICIENCY: ICD-10-CM

## 2019-04-01 DIAGNOSIS — E78.2 MIXED HYPERLIPIDEMIA: Primary | ICD-10-CM

## 2019-04-01 DIAGNOSIS — R73.9 HYPERGLYCEMIA: ICD-10-CM

## 2019-04-01 DIAGNOSIS — D50.8 IRON DEFICIENCY ANEMIA SECONDARY TO INADEQUATE DIETARY IRON INTAKE: ICD-10-CM

## 2019-04-01 DIAGNOSIS — E55.9 VITAMIN D DEFICIENCY: ICD-10-CM

## 2019-04-01 PROCEDURE — 36415 COLL VENOUS BLD VENIPUNCTURE: CPT

## 2019-04-01 PROCEDURE — 85025 COMPLETE CBC W/AUTO DIFF WBC: CPT

## 2019-04-01 PROCEDURE — 80061 LIPID PANEL: CPT

## 2019-04-01 PROCEDURE — 83540 ASSAY OF IRON: CPT

## 2019-04-01 PROCEDURE — 84466 ASSAY OF TRANSFERRIN: CPT

## 2019-04-01 PROCEDURE — 83036 HEMOGLOBIN GLYCOSYLATED A1C: CPT

## 2019-04-01 PROCEDURE — 82306 VITAMIN D 25 HYDROXY: CPT

## 2019-04-01 PROCEDURE — 80053 COMPREHEN METABOLIC PANEL: CPT

## 2019-04-05 ENCOUNTER — TELEPHONE (OUTPATIENT)
Dept: CARDIOLOGY | Age: 74
End: 2019-04-05

## 2019-04-15 PROBLEM — I70.0 ATHEROSCLEROSIS OF AORTA (CMD): Status: ACTIVE | Noted: 2019-04-15

## 2019-04-15 PROBLEM — R55 SYNCOPE: Status: ACTIVE | Noted: 2019-04-15

## 2019-04-15 PROBLEM — I10 HYPERTENSION: Status: ACTIVE | Noted: 2019-04-15

## 2019-04-15 PROBLEM — R00.2 PALPITATIONS: Status: ACTIVE | Noted: 2019-04-15

## 2019-04-15 RX ORDER — HYDROCODONE BITARTRATE AND ACETAMINOPHEN 7.5; 325 MG/1; MG/1
1 TABLET ORAL PRN
COMMUNITY
Start: 2019-03-20 | End: 2019-04-18 | Stop reason: CLARIF

## 2019-04-15 RX ORDER — MIDODRINE HYDROCHLORIDE 10 MG/1
10 TABLET ORAL 3 TIMES DAILY
COMMUNITY
Start: 2019-03-20 | End: 2019-04-18 | Stop reason: CLARIF

## 2019-04-15 RX ORDER — DOCUSATE SODIUM 100 MG/1
100 CAPSULE, LIQUID FILLED ORAL NIGHTLY
COMMUNITY
End: 2019-04-18 | Stop reason: CLARIF

## 2019-04-18 ENCOUNTER — OFFICE VISIT (OUTPATIENT)
Dept: CARDIOLOGY | Age: 74
End: 2019-04-18

## 2019-04-18 VITALS
OXYGEN SATURATION: 97 % | WEIGHT: 140 LBS | HEART RATE: 81 BPM | DIASTOLIC BLOOD PRESSURE: 72 MMHG | HEIGHT: 65 IN | BODY MASS INDEX: 23.32 KG/M2 | SYSTOLIC BLOOD PRESSURE: 152 MMHG

## 2019-04-18 DIAGNOSIS — I95.1 AUTONOMIC ORTHOSTATIC HYPOTENSION: ICD-10-CM

## 2019-04-18 DIAGNOSIS — R00.2 PALPITATIONS: Primary | ICD-10-CM

## 2019-04-18 DIAGNOSIS — I10 ESSENTIAL HYPERTENSION: ICD-10-CM

## 2019-04-18 DIAGNOSIS — R55 SYNCOPE AND COLLAPSE: ICD-10-CM

## 2019-04-18 PROCEDURE — 93000 ELECTROCARDIOGRAM COMPLETE: CPT | Performed by: INTERNAL MEDICINE

## 2019-04-18 PROCEDURE — 93298 REM INTERROG DEV EVAL SCRMS: CPT | Performed by: INTERNAL MEDICINE

## 2019-04-18 PROCEDURE — 99214 OFFICE O/P EST MOD 30 MIN: CPT | Performed by: INTERNAL MEDICINE

## 2019-04-18 RX ORDER — MIDODRINE HYDROCHLORIDE 5 MG/1
5 TABLET ORAL 3 TIMES DAILY
COMMUNITY
End: 2023-06-13 | Stop reason: DRUGHIGH

## 2019-04-23 ENCOUNTER — APPOINTMENT (OUTPATIENT)
Dept: CARDIOLOGY | Age: 74
End: 2019-04-23

## 2019-05-09 ENCOUNTER — HOSPITAL ENCOUNTER (OUTPATIENT)
Dept: CT IMAGING | Facility: HOSPITAL | Age: 74
Discharge: HOME | End: 2019-05-09
Attending: INTERNAL MEDICINE
Payer: MEDICARE

## 2019-05-09 DIAGNOSIS — R91.1 LUNG NODULE: ICD-10-CM

## 2019-05-09 PROCEDURE — 82565 ASSAY OF CREATININE: CPT

## 2019-05-09 PROCEDURE — 71260 CT THORAX DX C+: CPT | Performed by: INTERNAL MEDICINE

## 2019-06-08 ENCOUNTER — LAB ENCOUNTER (OUTPATIENT)
Dept: LAB | Facility: HOSPITAL | Age: 74
End: 2019-06-08
Attending: INTERNAL MEDICINE
Payer: MEDICARE

## 2019-06-08 DIAGNOSIS — E78.2 MIXED HYPERLIPIDEMIA: Primary | ICD-10-CM

## 2019-06-08 DIAGNOSIS — E55.9 VITAMIN D DEFICIENCY: ICD-10-CM

## 2019-06-08 DIAGNOSIS — E03.9 HYPOTHYROIDISM: ICD-10-CM

## 2019-06-08 LAB
ABSOLUTE IMMATURE GRANULOCYTES (OFFPRE24): NORMAL
ALBUMIN SERPL-MCNC: 4.2 G/DL
ALBUMIN/GLOB SERPL: 1.2 {RATIO}
ALP SERPL-CCNC: 91 U/L
ALT SERPL-CCNC: 23 U/L
ANION GAP SERPL CALC-SCNC: 6 MMOL/L
AST SERPL-CCNC: 22 U/L
BASO+EOS+MONOS # BLD: NORMAL 10*3/UL
BASO+EOS+MONOS NFR BLD: NORMAL %
BASOPHILS # BLD: NORMAL 10*3/UL
BASOPHILS NFR BLD: NORMAL %
BILIRUB SERPL-MCNC: 0.6 MG/DL
BUN SERPL-MCNC: 10 MG/DL
BUN/CREAT SERPL: 10.2
CALCIUM SERPL-MCNC: 9.4 MG/DL
CHLORIDE SERPL-SCNC: 106 MMOL/L
CHOLEST SERPL-MCNC: 236 MG/DL
CHOLEST/HDLC SERPL: 60 {RATIO}
CO2 SERPL-SCNC: 28 MMOL/L
CREAT SERPL-MCNC: 0.98 MG/DL
DIFFERENTIAL METHOD BLD: NORMAL
EOSINOPHIL # BLD: NORMAL 10*3/UL
EOSINOPHIL NFR BLD: NORMAL %
ERYTHROCYTE [DISTWIDTH] IN BLOOD: NORMAL %
GLOBULIN SER-MCNC: 3.6 G/DL
GLUCOSE SERPL-MCNC: 114 MG/DL
HCT VFR BLD CALC: 42.3 %
HDLC SERPL-MCNC: NORMAL MG/DL
HGB BLD-MCNC: 14 G/DL
IMMATURE GRANULOCYTES (OFFPRE25): NORMAL
LDLC SERPL CALC-MCNC: 146 MG/DL
LENGTH OF FAST TIME PATIENT: YES H
LENGTH OF FAST TIME PATIENT: YES H
LYMPHOCYTES # BLD: NORMAL 10*3/UL
LYMPHOCYTES NFR BLD: NORMAL %
MCH RBC QN AUTO: NORMAL PG
MCHC RBC AUTO-ENTMCNC: NORMAL G/DL
MCV RBC AUTO: NORMAL FL
MONOCYTES # BLD: NORMAL 10*3/UL
MONOCYTES NFR BLD: NORMAL %
MPV (OFFPRE2): NORMAL
NEUTROPHILS # BLD: NORMAL 10*3/UL
NEUTROPHILS NFR BLD: NORMAL %
NONHDLC SERPL-MCNC: 176 MG/DL
NRBC BLD MANUAL-RTO: NORMAL %
PLAT MORPH BLD: NORMAL
PLATELET # BLD: NORMAL 10*3/UL
POTASSIUM SERPL-SCNC: 3.8 MMOL/L
PROT SERPL-MCNC: 7.8 G/DL
RBC # BLD: 4.46 10*6/UL
RBC MORPH BLD: NORMAL
SODIUM SERPL-SCNC: 140 MMOL/L
TRIGL SERPL-MCNC: 149 MG/DL
VLDLC SERPL CALC-MCNC: 30 MG/DL
WBC # BLD: 4.1 10*3/UL
WBC MORPH BLD: NORMAL

## 2019-06-08 PROCEDURE — 85025 COMPLETE CBC W/AUTO DIFF WBC: CPT

## 2019-06-08 PROCEDURE — 80061 LIPID PANEL: CPT

## 2019-06-08 PROCEDURE — 84443 ASSAY THYROID STIM HORMONE: CPT

## 2019-06-08 PROCEDURE — 80053 COMPREHEN METABOLIC PANEL: CPT

## 2019-06-08 PROCEDURE — 84439 ASSAY OF FREE THYROXINE: CPT

## 2019-06-08 PROCEDURE — 36415 COLL VENOUS BLD VENIPUNCTURE: CPT

## 2019-06-16 PROCEDURE — X1094 NO CHARGE VISIT: HCPCS | Performed by: INTERNAL MEDICINE

## 2019-07-17 PROCEDURE — X1094 NO CHARGE VISIT: HCPCS | Performed by: INTERNAL MEDICINE

## 2019-07-19 ENCOUNTER — ANCILLARY PROCEDURE (OUTPATIENT)
Dept: CARDIOLOGY | Age: 74
End: 2019-07-19
Attending: INTERNAL MEDICINE

## 2019-07-19 ENCOUNTER — ANCILLARY ORDERS (OUTPATIENT)
Dept: CARDIOLOGY | Age: 74
End: 2019-07-19

## 2019-07-19 DIAGNOSIS — Z98.890 HISTORY OF LOOP RECORDER: ICD-10-CM

## 2019-08-14 PROCEDURE — 93298 REM INTERROG DEV EVAL SCRMS: CPT | Performed by: INTERNAL MEDICINE

## 2019-08-17 PROCEDURE — X1094 NO CHARGE VISIT: HCPCS | Performed by: INTERNAL MEDICINE

## 2019-09-18 PROCEDURE — 93298 REM INTERROG DEV EVAL SCRMS: CPT | Performed by: INTERNAL MEDICINE

## 2019-10-15 ENCOUNTER — OFFICE VISIT (OUTPATIENT)
Dept: CARDIOLOGY | Age: 74
End: 2019-10-15

## 2019-10-15 VITALS
HEART RATE: 86 BPM | SYSTOLIC BLOOD PRESSURE: 158 MMHG | DIASTOLIC BLOOD PRESSURE: 86 MMHG | OXYGEN SATURATION: 98 % | WEIGHT: 135 LBS | HEIGHT: 65 IN | BODY MASS INDEX: 22.49 KG/M2

## 2019-10-15 DIAGNOSIS — R55 SYNCOPE AND COLLAPSE: ICD-10-CM

## 2019-10-15 DIAGNOSIS — I95.1 AUTONOMIC ORTHOSTATIC HYPOTENSION: ICD-10-CM

## 2019-10-15 DIAGNOSIS — I10 ESSENTIAL HYPERTENSION: ICD-10-CM

## 2019-10-15 DIAGNOSIS — R00.2 PALPITATIONS: Primary | ICD-10-CM

## 2019-10-15 DIAGNOSIS — E78.00 PURE HYPERCHOLESTEROLEMIA: ICD-10-CM

## 2019-10-15 PROCEDURE — 99214 OFFICE O/P EST MOD 30 MIN: CPT | Performed by: INTERNAL MEDICINE

## 2019-10-15 PROCEDURE — 93000 ELECTROCARDIOGRAM COMPLETE: CPT | Performed by: INTERNAL MEDICINE

## 2019-10-15 RX ORDER — ATORVASTATIN CALCIUM 20 MG/1
1 TABLET, FILM COATED ORAL DAILY
COMMUNITY
Start: 2019-09-25 | End: 2023-05-02 | Stop reason: ALTCHOICE

## 2019-10-15 ASSESSMENT — PATIENT HEALTH QUESTIONNAIRE - PHQ9
2. FEELING DOWN, DEPRESSED OR HOPELESS: NOT AT ALL
1. LITTLE INTEREST OR PLEASURE IN DOING THINGS: NOT AT ALL
SUM OF ALL RESPONSES TO PHQ9 QUESTIONS 1 AND 2: 0
SUM OF ALL RESPONSES TO PHQ9 QUESTIONS 1 AND 2: 0

## 2019-10-18 PROCEDURE — 93298 REM INTERROG DEV EVAL SCRMS: CPT | Performed by: INTERNAL MEDICINE

## 2019-10-26 ENCOUNTER — LAB ENCOUNTER (OUTPATIENT)
Dept: LAB | Facility: HOSPITAL | Age: 74
End: 2019-10-26
Attending: INTERNAL MEDICINE
Payer: MEDICARE

## 2019-10-26 DIAGNOSIS — E78.2 MIXED HYPERLIPIDEMIA: Primary | ICD-10-CM

## 2019-10-26 LAB
ALT SERPL-CCNC: 24 UNITS/L
ANION GAP SERPL CALC-SCNC: 6 MMOL/L
AST SERPL-CCNC: 24 UNITS/L
BUN SERPL-MCNC: 9 MG/DL
BUN/CREAT SERPL: 9.7
CALCIUM SERPL-MCNC: 9.6 MG/DL
CHLORIDE SERPL-SCNC: 106 MMOL/L
CHOLEST SERPL-MCNC: 145 MG/DL
CHOLEST/HDLC SERPL: 67 {RATIO}
CO2 SERPL-SCNC: 29 MMOL/L
CREAT SERPL-MCNC: 0.93 MG/DL
GLUCOSE SERPL-MCNC: 123 MG/DL
LDLC SERPL CALC-MCNC: 52 MG/DL
NONHDLC SERPL-MCNC: 78 MG/DL
POTASSIUM SERPL-SCNC: 3.9 MMOL/L
SODIUM SERPL-SCNC: 141 MMOL/L
TRIGL SERPL-MCNC: 129 MG/DL
VLDLC SERPL CALC-MCNC: 26 MG/DL

## 2019-10-26 PROCEDURE — 36415 COLL VENOUS BLD VENIPUNCTURE: CPT

## 2019-10-26 PROCEDURE — 80061 LIPID PANEL: CPT

## 2019-10-26 PROCEDURE — 80053 COMPREHEN METABOLIC PANEL: CPT

## 2019-10-30 ENCOUNTER — HOSPITAL (OUTPATIENT)
Dept: OTHER | Age: 74
End: 2019-10-30
Attending: OBSTETRICS & GYNECOLOGY

## 2019-11-20 PROCEDURE — 93298 REM INTERROG DEV EVAL SCRMS: CPT | Performed by: INTERNAL MEDICINE

## 2019-12-27 ENCOUNTER — HOSPITAL ENCOUNTER (OUTPATIENT)
Age: 74
Discharge: HOME | End: 2019-12-27
Attending: EMERGENCY MEDICINE
Payer: MEDICARE

## 2019-12-27 VITALS
HEART RATE: 84 BPM | BODY MASS INDEX: 22.33 KG/M2 | HEIGHT: 65 IN | SYSTOLIC BLOOD PRESSURE: 160 MMHG | TEMPERATURE: 98 F | WEIGHT: 134 LBS | DIASTOLIC BLOOD PRESSURE: 88 MMHG | OXYGEN SATURATION: 98 % | RESPIRATION RATE: 20 BRPM

## 2019-12-27 DIAGNOSIS — R19.7 DIARRHEA, UNSPECIFIED TYPE: Primary | ICD-10-CM

## 2019-12-27 LAB
#MXD IC: 0.6 X10ˆ3/UL (ref 0.1–1)
CREAT BLD-MCNC: 1.1 MG/DL (ref 0.55–1.02)
GLUCOSE BLD-MCNC: 123 MG/DL (ref 70–99)
HCT VFR BLD AUTO: 43 % (ref 35–48)
HGB BLD-MCNC: 14.4 G/DL (ref 12–16)
ISTAT BUN: 9 MG/DL (ref 8–20)
ISTAT CHLORIDE: 100 MMOL/L (ref 101–111)
ISTAT HEMATOCRIT: 45 % (ref 34–50)
ISTAT IONIZED CALCIUM FOR CHEM 8: 1.21 MMOL/L (ref 1.12–1.32)
ISTAT POTASSIUM: 3.6 MMOL/L (ref 3.6–5.1)
ISTAT SODIUM: 138 MMOL/L (ref 136–145)
ISTAT TCO2: 28 MMOL/L (ref 22–32)
LYMPHOCYTES # BLD AUTO: 1.3 X10ˆ3/UL (ref 1–4)
LYMPHOCYTES NFR BLD AUTO: 23.4 %
MCH RBC QN AUTO: 32.1 PG (ref 26–34)
MCHC RBC AUTO-ENTMCNC: 33.5 G/DL (ref 31–37)
MCV RBC AUTO: 95.8 FL (ref 80–100)
MIXED CELL %: 9.9 %
NEUTROPHILS # BLD AUTO: 3.7 X10ˆ3/UL (ref 1.5–7.7)
NEUTROPHILS NFR BLD AUTO: 66.7 %
PLATELET # BLD AUTO: 175 X10ˆ3/UL (ref 150–450)
RBC # BLD AUTO: 4.49 X10ˆ6/UL (ref 3.8–5.3)
WBC # BLD AUTO: 5.6 X10ˆ3/UL (ref 4–11)

## 2019-12-27 PROCEDURE — 85025 COMPLETE CBC W/AUTO DIFF WBC: CPT | Performed by: EMERGENCY MEDICINE

## 2019-12-27 PROCEDURE — 99213 OFFICE O/P EST LOW 20 MIN: CPT

## 2019-12-27 PROCEDURE — 36415 COLL VENOUS BLD VENIPUNCTURE: CPT

## 2019-12-27 PROCEDURE — 80047 BASIC METABLC PNL IONIZED CA: CPT

## 2019-12-27 NOTE — ED PROVIDER NOTES
Patient Seen in: [facility]    History   Patient presents with:  Diarrhea    Stated Complaint: DIARRHEA     HPI    Patient is here with complaint of 4 to 5 days of watery stools.   No blood no mucus no abdominal pain but o COMPLEX/VITAMIN C) Oral Tab,  Take 1 tablet by mouth daily. Calcium Carbonate-Vitamin D 500-400 MG-UNIT Oral Tab,  Take 1 tablet by mouth daily.    HYDROcodone-acetaminophen 7.5-325 MG Oral Tab,  Take 1 tablet by mouth every 4 (four) hours as needed for P distress with diarrhea she has not tried any medications I recommended trying Imodium we will do basic blood work make sure no underlying obvious abnormality noted    Patient tests were reassuring she had a mild decrease in her kidney function I encouraged

## 2019-12-27 NOTE — ED INITIAL ASSESSMENT (HPI)
PATIENT ARRIVED AMBULATORY TO ROOM C/O INTERMITTENT DIARRHEA X5 DAYS. PATIENT STATES \"IN THE MORNING AND AT NIGHT I HAVE SOME DIARRHEA BUT DURING THE DAY IM FINE\" NO ABDOMINAL PAIN. NO FEVERS. NO N/V. EASY NON LABORED RESPIRATIONS. NO RECENT TRAVEL.

## 2020-01-03 PROCEDURE — 93298 REM INTERROG DEV EVAL SCRMS: CPT | Performed by: INTERNAL MEDICINE

## 2020-01-22 ENCOUNTER — TELEPHONE (OUTPATIENT)
Dept: NEUROLOGY | Facility: CLINIC | Age: 75
End: 2020-01-22

## 2020-01-22 DIAGNOSIS — R55 SYNCOPE, UNSPECIFIED SYNCOPE TYPE: ICD-10-CM

## 2020-01-22 DIAGNOSIS — I67.1 CEREBRAL ANEURYSM: Primary | ICD-10-CM

## 2020-01-22 NOTE — TELEPHONE ENCOUNTER
Medicare online for insurance coverage of MRA BRAIN (CPT=70544) . Insurance was verified and procedure is a cover benefit and does not require authorization. Will inform patient. Patient informed of approval. Can proceed to schedule appointment.

## 2020-01-22 NOTE — TELEPHONE ENCOUNTER
Patient calling requesting new MRA Brain order to be placed. Informed patient that our referral team will call her when approved to then schedule. New order placed.

## 2020-01-22 NOTE — TELEPHONE ENCOUNTER
LMTCB Problem: Goal Outcome Summary  Goal: Goal Outcome Summary  Outcome: Improving  WBC 10.8, PCA dc'd, up indep in halls. T max 99.0 requested advil. MIRELLA's x 4 irrigated q 6 hr. Improved mood and appetite, CT tomorrow.

## 2020-01-27 PROCEDURE — 93298 REM INTERROG DEV EVAL SCRMS: CPT | Performed by: INTERNAL MEDICINE

## 2020-02-13 ENCOUNTER — HOSPITAL ENCOUNTER (OUTPATIENT)
Dept: MRI IMAGING | Facility: HOSPITAL | Age: 75
Discharge: HOME | End: 2020-02-13
Attending: Other
Payer: MEDICARE

## 2020-02-13 DIAGNOSIS — R55 SYNCOPE, UNSPECIFIED SYNCOPE TYPE: ICD-10-CM

## 2020-02-13 DIAGNOSIS — I67.1 CEREBRAL ANEURYSM: ICD-10-CM

## 2020-02-13 PROCEDURE — 70544 MR ANGIOGRAPHY HEAD W/O DYE: CPT | Performed by: OTHER

## 2020-02-14 DIAGNOSIS — I67.1 CEREBRAL ANEURYSM: Primary | ICD-10-CM

## 2020-02-19 PROCEDURE — 93298 REM INTERROG DEV EVAL SCRMS: CPT | Performed by: INTERNAL MEDICINE

## 2020-03-25 PROCEDURE — 93298 REM INTERROG DEV EVAL SCRMS: CPT | Performed by: INTERNAL MEDICINE

## 2020-04-23 PROCEDURE — 93298 REM INTERROG DEV EVAL SCRMS: CPT | Performed by: INTERNAL MEDICINE

## 2020-05-27 PROCEDURE — 93298 REM INTERROG DEV EVAL SCRMS: CPT | Performed by: INTERNAL MEDICINE

## 2020-06-26 PROCEDURE — X1094 NO CHARGE VISIT: HCPCS | Performed by: INTERNAL MEDICINE

## 2020-08-03 PROCEDURE — 93298 REM INTERROG DEV EVAL SCRMS: CPT | Performed by: INTERNAL MEDICINE

## 2020-08-26 PROCEDURE — 93298 REM INTERROG DEV EVAL SCRMS: CPT | Performed by: INTERNAL MEDICINE

## 2020-08-27 ENCOUNTER — ANCILLARY PROCEDURE (OUTPATIENT)
Dept: CARDIOLOGY | Age: 75
End: 2020-08-27
Attending: INTERNAL MEDICINE

## 2020-08-27 ENCOUNTER — ANCILLARY ORDERS (OUTPATIENT)
Dept: CARDIOLOGY | Age: 75
End: 2020-08-27

## 2020-08-27 DIAGNOSIS — Z98.890 HISTORY OF LOOP RECORDER: ICD-10-CM

## 2020-09-28 PROCEDURE — 93298 REM INTERROG DEV EVAL SCRMS: CPT | Performed by: INTERNAL MEDICINE

## 2020-10-16 ENCOUNTER — OFFICE VISIT (OUTPATIENT)
Dept: CARDIOLOGY | Age: 75
End: 2020-10-16

## 2020-10-16 ENCOUNTER — TELEPHONE (OUTPATIENT)
Dept: CARDIOLOGY | Age: 75
End: 2020-10-16

## 2020-10-16 ENCOUNTER — LAB ENCOUNTER (OUTPATIENT)
Dept: LAB | Facility: HOSPITAL | Age: 75
End: 2020-10-16
Attending: INTERNAL MEDICINE
Payer: MEDICARE

## 2020-10-16 VITALS
RESPIRATION RATE: 18 BRPM | SYSTOLIC BLOOD PRESSURE: 160 MMHG | WEIGHT: 135 LBS | BODY MASS INDEX: 22.49 KG/M2 | OXYGEN SATURATION: 99 % | HEIGHT: 65 IN | DIASTOLIC BLOOD PRESSURE: 90 MMHG | HEART RATE: 86 BPM

## 2020-10-16 DIAGNOSIS — E55.9 VITAMIN D DEFICIENCY: ICD-10-CM

## 2020-10-16 DIAGNOSIS — E78.00 PURE HYPERCHOLESTEROLEMIA: ICD-10-CM

## 2020-10-16 DIAGNOSIS — E78.5 HYPERLIPIDEMIA: Primary | ICD-10-CM

## 2020-10-16 DIAGNOSIS — D64.9 ANEMIA: ICD-10-CM

## 2020-10-16 DIAGNOSIS — R55 SYNCOPE AND COLLAPSE: ICD-10-CM

## 2020-10-16 DIAGNOSIS — E53.8 VITAMIN B12 DEFICIENCY: ICD-10-CM

## 2020-10-16 DIAGNOSIS — R00.2 PALPITATIONS: Primary | ICD-10-CM

## 2020-10-16 DIAGNOSIS — I95.1 AUTONOMIC ORTHOSTATIC HYPOTENSION: ICD-10-CM

## 2020-10-16 DIAGNOSIS — I10 ESSENTIAL HYPERTENSION: ICD-10-CM

## 2020-10-16 DIAGNOSIS — I95.1 AUTONOMIC ORTHOSTATIC HYPOTENSION: Primary | ICD-10-CM

## 2020-10-16 PROCEDURE — 93000 ELECTROCARDIOGRAM COMPLETE: CPT | Performed by: INTERNAL MEDICINE

## 2020-10-16 PROCEDURE — 83540 ASSAY OF IRON: CPT

## 2020-10-16 PROCEDURE — 99214 OFFICE O/P EST MOD 30 MIN: CPT | Performed by: INTERNAL MEDICINE

## 2020-10-16 PROCEDURE — 82607 VITAMIN B-12: CPT

## 2020-10-16 PROCEDURE — 80061 LIPID PANEL: CPT

## 2020-10-16 PROCEDURE — 82306 VITAMIN D 25 HYDROXY: CPT

## 2020-10-16 PROCEDURE — 85025 COMPLETE CBC W/AUTO DIFF WBC: CPT

## 2020-10-16 PROCEDURE — 80053 COMPREHEN METABOLIC PANEL: CPT

## 2020-10-16 PROCEDURE — 84466 ASSAY OF TRANSFERRIN: CPT

## 2020-10-16 PROCEDURE — 36415 COLL VENOUS BLD VENIPUNCTURE: CPT

## 2020-10-16 ASSESSMENT — PATIENT HEALTH QUESTIONNAIRE - PHQ9
CLINICAL INTERPRETATION OF PHQ9 SCORE: NO FURTHER SCREENING NEEDED
CLINICAL INTERPRETATION OF PHQ2 SCORE: NO FURTHER SCREENING NEEDED
SUM OF ALL RESPONSES TO PHQ9 QUESTIONS 1 AND 2: 0
2. FEELING DOWN, DEPRESSED OR HOPELESS: NOT AT ALL
SUM OF ALL RESPONSES TO PHQ9 QUESTIONS 1 AND 2: 0
1. LITTLE INTEREST OR PLEASURE IN DOING THINGS: NOT AT ALL

## 2020-10-27 PROCEDURE — X1094 NO CHARGE VISIT: HCPCS | Performed by: INTERNAL MEDICINE

## 2020-11-03 ENCOUNTER — HOSPITAL ENCOUNTER (OUTPATIENT)
Dept: MAMMOGRAPHY | Age: 75
Discharge: HOME | End: 2020-11-03
Attending: OBSTETRICS & GYNECOLOGY

## 2020-11-03 DIAGNOSIS — Z12.31 ENCOUNTER FOR SCREENING MAMMOGRAM FOR MALIGNANT NEOPLASM OF BREAST: ICD-10-CM

## 2020-11-03 PROCEDURE — 77063 BREAST TOMOSYNTHESIS BI: CPT

## 2020-11-03 RX ORDER — MIDODRINE HYDROCHLORIDE 5 MG/1
5 TABLET ORAL 2 TIMES DAILY
COMMUNITY

## 2020-11-05 ENCOUNTER — HOSPITAL ENCOUNTER (OUTPATIENT)
Dept: INTERVENTIONAL RADIOLOGY/VASCULAR | Facility: HOSPITAL | Age: 75
Discharge: HOME | End: 2020-11-05
Attending: INTERNAL MEDICINE | Admitting: INTERNAL MEDICINE
Payer: MEDICARE

## 2020-11-05 VITALS
TEMPERATURE: 99 F | SYSTOLIC BLOOD PRESSURE: 147 MMHG | RESPIRATION RATE: 14 BRPM | WEIGHT: 135 LBS | DIASTOLIC BLOOD PRESSURE: 71 MMHG | BODY MASS INDEX: 22 KG/M2 | HEART RATE: 76 BPM

## 2020-11-05 DIAGNOSIS — Z45.09 ENCOUNTER FOR LOOP RECORDER AT END OF BATTERY LIFE: ICD-10-CM

## 2020-11-05 PROCEDURE — 0JPT32Z REMOVAL OF MONITORING DEVICE FROM TRUNK SUBCUTANEOUS TISSUE AND FASCIA, PERCUTANEOUS APPROACH: ICD-10-PCS | Performed by: INTERNAL MEDICINE

## 2020-11-05 PROCEDURE — 33285 INSJ SUBQ CAR RHYTHM MNTR: CPT | Performed by: INTERNAL MEDICINE

## 2020-11-05 PROCEDURE — 33286 RMVL SUBQ CAR RHYTHM MNTR: CPT

## 2020-11-05 RX ORDER — LIDOCAINE HYDROCHLORIDE AND EPINEPHRINE 10; 10 MG/ML; UG/ML
INJECTION, SOLUTION INFILTRATION; PERINEURAL
Status: COMPLETED
Start: 2020-11-05 | End: 2020-11-05

## 2020-11-05 NOTE — PROCEDURES
Procedure- Loop device removal (Loop Monitor) HIREN De Los Santos MD    Indication- Arrhythmia evaluation, cryptogenic CVA, device end of life  Complication- none  EBL - minimal  Sedation - local lidocaine    Methods- The patient was prepped and

## 2020-11-05 NOTE — IVS NOTE
Patient tolerated procedure well. Dressing intact and clean and dry left chest area. Discharge instructions given. Patient discharged home ambulatory.

## 2020-11-05 NOTE — INTERVAL H&P NOTE
Pre-op Diagnosis: * No pre-op diagnosis entered *    The above referenced H&P was reviewed by Alberto Gabriel MD on 11/5/2020, the patient was examined and no significant changes have occurred in the patient's condition since the H&P was performed.   I discusse

## 2020-11-18 ENCOUNTER — HOSPITAL ENCOUNTER (OUTPATIENT)
Dept: ULTRASOUND IMAGING | Facility: HOSPITAL | Age: 75
Discharge: HOME | End: 2020-11-18
Attending: INTERNAL MEDICINE
Payer: MEDICARE

## 2020-11-18 DIAGNOSIS — R10.11 RUQ ABDOMINAL PAIN: ICD-10-CM

## 2020-11-18 PROCEDURE — 76705 ECHO EXAM OF ABDOMEN: CPT | Performed by: INTERNAL MEDICINE

## 2021-01-06 ENCOUNTER — OFFICE VISIT (OUTPATIENT)
Dept: NEUROLOGY | Facility: CLINIC | Age: 76
End: 2021-01-06
Payer: MEDICARE

## 2021-01-06 VITALS
BODY MASS INDEX: 21.66 KG/M2 | WEIGHT: 130 LBS | DIASTOLIC BLOOD PRESSURE: 70 MMHG | SYSTOLIC BLOOD PRESSURE: 160 MMHG | HEIGHT: 65 IN

## 2021-01-06 DIAGNOSIS — I95.1 ORTHOSTATIC HYPOTENSION: Primary | ICD-10-CM

## 2021-01-06 DIAGNOSIS — I67.2 INTRACRANIAL ATHEROSCLEROSIS: ICD-10-CM

## 2021-01-06 DIAGNOSIS — I72.5 BASILAR ARTERY ANEURYSM (HCC): ICD-10-CM

## 2021-01-06 PROCEDURE — 99215 OFFICE O/P EST HI 40 MIN: CPT | Performed by: OTHER

## 2021-01-06 NOTE — PATIENT INSTRUCTIONS
-MRA brain in February 2021. If this is stable, we can do monitoring in 2 years again. Please schedule before 2/14/2021.   -Driving - no restrictions. If you are lightheaded please do not drive.   -Please use compression stockings during the day only.  Let Intolerance:    1. Make all postural changes from lying to sitting or sitting to standing, slowly. 2. Drink 2.0-2.5 L of fluid per day (if okay with your other doctors). Otherwise, 8 oz cups about 8 per days.    3. Increase sodium in the diet to 3-5 g per

## 2021-01-06 NOTE — PROGRESS NOTES
[address]  [address]  [phone]          Follow Up Visit       Date: January 6, 2021  Patient Name: Alejandro Holden   MRN: [id number]  Primary physician: Osman Chow. No unilateral numbness, weakness, face weakness, dysarthria, diplopia, decreased level of consicousness or LOC. Sometimes her BP is as low as SBP 110s but she does not have symptoms. She drinks 3-4 cups of water.      OUTPATIENT MEDICATIONS    •  Mi CN XI: normal strength of trapezius and SCM muscles bilaterally. CN XII: tongue protrudes in the midline, no atrophy or fasciculations.     Motor Exam:   Muscle Bulk: No atrophy or fasciculations   Muscle Tone: normal tone in upper and lower extremities 1. 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 This is similar in size and morphology as compared with outside institution CTA from April, 2018.  If no further intervention is planned, continued imaging -We had long discussion on conservation management of symptoms including compression stockings, staying hydrated, changing positions slowly.  Labile blood pressures should be managed carefully as she has been hypertensive SBP > 150-180s and I counseled her Today, I personally spent 60 minutes in direct face to face time with the patient, 10 minutes on phone w/ , of which greater than 50% of the time was spent in patient education, counseling, and coordination of care as described above.    Issues discu

## 2021-01-20 ENCOUNTER — HOSPITAL ENCOUNTER (OUTPATIENT)
Dept: MRI IMAGING | Facility: HOSPITAL | Age: 76
Discharge: HOME | End: 2021-01-20
Attending: Other
Payer: MEDICARE

## 2021-01-20 DIAGNOSIS — I67.1 CEREBRAL ANEURYSM: ICD-10-CM

## 2021-01-20 PROCEDURE — 70544 MR ANGIOGRAPHY HEAD W/O DYE: CPT | Performed by: OTHER

## 2021-01-26 ENCOUNTER — TELEPHONE (OUTPATIENT)
Dept: NEUROLOGY | Facility: CLINIC | Age: 76
End: 2021-01-26

## 2021-01-26 DIAGNOSIS — I72.5 BASILAR ARTERY ANEURYSM (HCC): Primary | ICD-10-CM

## 2021-01-26 NOTE — TELEPHONE ENCOUNTER
Hello,  Please let patient know her MRA brain revealed a stable size of her basilar aneurysm. Plan is to do repeat MRA brain in 2 years (2023) for follow up, unless she has new symptoms in the meantime.     Thank you,  Darryl Montana, DO

## 2021-02-22 ENCOUNTER — TELEPHONE (OUTPATIENT)
Dept: GASTROENTEROLOGY | Facility: CLINIC | Age: 76
End: 2021-02-22

## 2021-02-22 ENCOUNTER — OFFICE VISIT (OUTPATIENT)
Dept: GASTROENTEROLOGY | Facility: CLINIC | Age: 76
End: 2021-02-22
Payer: MEDICARE

## 2021-02-22 VITALS
TEMPERATURE: 99 F | SYSTOLIC BLOOD PRESSURE: 124 MMHG | BODY MASS INDEX: 23.16 KG/M2 | HEART RATE: 89 BPM | DIASTOLIC BLOOD PRESSURE: 69 MMHG | WEIGHT: 139 LBS | HEIGHT: 65 IN

## 2021-02-22 DIAGNOSIS — Z86.010 HISTORY OF COLON POLYPS: Primary | ICD-10-CM

## 2021-02-22 DIAGNOSIS — Z86.010 HX OF COLONIC POLYPS: Primary | ICD-10-CM

## 2021-02-22 DIAGNOSIS — K31.7 GASTRIC POLYPS: ICD-10-CM

## 2021-02-22 DIAGNOSIS — Z87.19 HISTORY OF GASTRIC POLYP: ICD-10-CM

## 2021-02-22 PROCEDURE — 99203 OFFICE O/P NEW LOW 30 MIN: CPT | Performed by: INTERNAL MEDICINE

## 2021-02-22 RX ORDER — CLOTRIMAZOLE AND BETAMETHASONE DIPROPIONATE 10; .64 MG/G; MG/G
CREAM TOPICAL
COMMUNITY
Start: 2020-10-26

## 2021-02-22 RX ORDER — POLYETHYLENE GLYCOL 3350, SODIUM CHLORIDE, SODIUM BICARBONATE, POTASSIUM CHLORIDE 420; 11.2; 5.72; 1.48 G/4L; G/4L; G/4L; G/4L
4 POWDER, FOR SOLUTION ORAL ONCE
Qty: 4000 ML | Refills: 0 | Status: SHIPPED | OUTPATIENT
Start: 2021-02-22 | End: 2021-02-22

## 2021-02-22 NOTE — PATIENT INSTRUCTIONS
Schedule upper and lower endoscopy for a history of gastric and colon polyps following a split dose TriLyte preparation and monitored anesthesia care.

## 2021-02-22 NOTE — TELEPHONE ENCOUNTER
Scheduled for: Colonoscopy [id number]/EGD [address]  Provider Name: Dr Sindy Haro  Date: Fri 5/21/2021    Location: [facility]    Sedation: MAC   Time: 10:30 am, pt is aware that [facility] will call day before procedure with arrival time   Prep: split dose trilyte   Meds/All

## 2021-02-22 NOTE — PROGRESS NOTES
HPI:    Patient ID: Chandler Vasquez is a 70year old female. HPI  The patient is seen today along with her  for a discussion of a history of colon and stomach polyps. She has been under the care of Dr. Judie Mendoza who has since retired.     The patient stat 03/18/19 : 146 lb 1.6 oz (66.3 kg)  10/02/18 : 143 lb (64.9 kg)           Current Outpatient Medications   Medication Sig Dispense Refill   • clotrimazole-betamethasone 1-0.05 % External Cream      • Aspirin (ASPIR-LOW OR) Take 81 mg by mouth 3 (three) oanh Lymphadenopathy:     She has no cervical adenopathy. Neurological: She is alert and oriented to person, place, and time. Psychiatric: She has a normal mood and affect. Her behavior is normal.   Nursing note and vitals reviewed.     Component      Late TOTAL PROTEIN      6.4 - 8.2 g/dL  7.8   Albumin      3.4 - 5.0 g/dL  4.4   Globulin      2.8 - 4.4 g/dL  3.4   A/G Ratio      1.0 - 2.0  1.3   Patient Fasting?        Yes Yes   Cholesterol, Total      <200 mg/dL 143    HDL Cholesterol      40 - 59 mg/dL 80 and focal prominent benign lymphoid aggregate.      C. Stomach; biopsy:   · Ffundic gland polyp. · Diff-Quik stain (with appropriate control reactivity) is negative for Helicobacter pylori microorganisms.      D.  Stomach; antrum, biopsy:   · Fragments of OPERATION DATE:  07/21/2017     OPERATIVE REPORT     PREOPERATIVE DIAGNOSIS:    1. History of colon polyps for surveillance colonoscope. 2.       Gastroesophageal reflux disease. POSTOPERATIVE DIAGNOSIS:    1.       Lower GI:            a.    Rectal fairly normal except at appendiceal orifice, there are hyperplastic type of mucosa noticed around the appendiceal orifice. Therefore, at this area 1 biopsy was taken to confirm the histological picture. After the biopsy, the terminal ileum was entered. the colon shows multiple polyps, though they are hyperplastic, we will still recommend to repeat the colonoscope between 3-5 years provided there are no unexpected symptoms that arise.     For the upper GI:  The degree of gastritis was moderate, though the ileum and the entire   lower GI  mucosa was normal.  No diverticulosis except a small hyperplastic   polyp at  proximal sigmoid colon and small internal hemorrhoids of no   clinical  significance in distal rectum.   The small hyperplastic polyp at   the sig                            ASSESSMENT/PLAN:   History of colon polyps  (primary encounter diagnosis)  Gastric polyps  Other than constipation, postprandial eructation and contact rectal bleeding, the patient is asymptomatic from a lower gastrointestinal tr

## 2021-03-15 DIAGNOSIS — Z23 NEED FOR VACCINATION: ICD-10-CM

## 2021-03-24 ENCOUNTER — TELEPHONE (OUTPATIENT)
Dept: OTHER | Age: 76
End: 2021-03-24

## 2021-03-29 ENCOUNTER — TELEPHONE (OUTPATIENT)
Dept: OTHER | Age: 76
End: 2021-03-29

## 2021-04-01 ENCOUNTER — TELEPHONE (OUTPATIENT)
Dept: OTHER | Age: 76
End: 2021-04-01

## 2021-04-16 ENCOUNTER — LAB ENCOUNTER (OUTPATIENT)
Dept: LAB | Facility: HOSPITAL | Age: 76
End: 2021-04-16
Attending: INTERNAL MEDICINE
Payer: MEDICARE

## 2021-04-16 DIAGNOSIS — D50.8 IRON DEFICIENCY ANEMIA SECONDARY TO INADEQUATE DIETARY IRON INTAKE: ICD-10-CM

## 2021-04-16 DIAGNOSIS — E78.2 MIXED HYPERLIPIDEMIA: Primary | ICD-10-CM

## 2021-04-16 PROCEDURE — 85025 COMPLETE CBC W/AUTO DIFF WBC: CPT

## 2021-04-16 PROCEDURE — 36415 COLL VENOUS BLD VENIPUNCTURE: CPT

## 2021-04-16 PROCEDURE — 80061 LIPID PANEL: CPT

## 2021-04-16 PROCEDURE — 80053 COMPREHEN METABOLIC PANEL: CPT

## 2021-05-18 ENCOUNTER — LAB ENCOUNTER (OUTPATIENT)
Dept: LAB | Facility: HOSPITAL | Age: 76
End: 2021-05-18
Attending: Other
Payer: MEDICARE

## 2021-05-18 ENCOUNTER — TELEPHONE (OUTPATIENT)
Dept: GASTROENTEROLOGY | Facility: CLINIC | Age: 76
End: 2021-05-18

## 2021-05-18 DIAGNOSIS — I95.1 ORTHOSTATIC HYPOTENSION: ICD-10-CM

## 2021-05-18 PROCEDURE — 84207 ASSAY OF VITAMIN B-6: CPT

## 2021-05-18 PROCEDURE — 36415 COLL VENOUS BLD VENIPUNCTURE: CPT

## 2021-05-18 NOTE — TELEPHONE ENCOUNTER
I called back and spoke with Delores Leung, who wanted to clarify what medications to hold prior to procedures on Friday. I instructed the patient that she should HOLD Midodrine.  States she takes this in the AM and PM. I advised her to hold evening prior/nyasia

## 2021-05-18 NOTE — TELEPHONE ENCOUNTER
Pt has a procedure on 5-20 and has questions about if she can take her meds  Please call pt after 12 pm she is going to get her Covid test.   1)   Midodrine HCl 5 MG Oral Tab       Sig:   Take 5 mg by mouth 3 (three) times daily.      Route:   Oral     Clas

## 2021-05-19 ENCOUNTER — LAB REQUISITION (OUTPATIENT)
Dept: LAB | Facility: HOSPITAL | Age: 76
End: 2021-05-19
Payer: MEDICARE

## 2021-05-19 DIAGNOSIS — Z01.818 ENCOUNTER FOR OTHER PREPROCEDURAL EXAMINATION: ICD-10-CM

## 2021-05-21 ENCOUNTER — SURGERY CENTER DOCUMENTATION (OUTPATIENT)
Dept: SURGERY | Age: 76
End: 2021-05-21

## 2021-05-21 ENCOUNTER — LAB REQUISITION (OUTPATIENT)
Dept: LAB | Facility: HOSPITAL | Age: 76
End: 2021-05-21
Payer: MEDICARE

## 2021-05-21 DIAGNOSIS — Z12.11 ENCOUNTER FOR SCREENING FOR MALIGNANT NEOPLASM OF COLON: ICD-10-CM

## 2021-05-21 DIAGNOSIS — Z87.19 PERSONAL HISTORY OF OTHER DISEASES OF THE DIGESTIVE SYSTEM: ICD-10-CM

## 2021-05-21 DIAGNOSIS — Z86.000 PERSONAL HISTORY OF IN-SITU NEOPLASM OF BREAST: ICD-10-CM

## 2021-05-21 PROCEDURE — 43239 EGD BIOPSY SINGLE/MULTIPLE: CPT | Performed by: INTERNAL MEDICINE

## 2021-05-21 PROCEDURE — 45385 COLONOSCOPY W/LESION REMOVAL: CPT | Performed by: INTERNAL MEDICINE

## 2021-05-21 PROCEDURE — 88312 SPECIAL STAINS GROUP 1: CPT | Performed by: INTERNAL MEDICINE

## 2021-05-21 PROCEDURE — 88305 TISSUE EXAM BY PATHOLOGIST: CPT | Performed by: INTERNAL MEDICINE

## 2021-05-21 NOTE — PROCEDURES
[address] Endoscopy Report      Date of Procedure:  05/21/21      Preoperative Diagnosis:  1. Colorectal cancer screening  2. Personal history of colon polyps  3. Personal history of gastric polyps  4.   Postprandial eructation insufflated air and fluid were suctioned and the endoscope was withdrawn. The procedures were well tolerated without immediate complication. Findings:  The preparation of the colon was excellent.   The terminal ileum was examined for 5 cm and visually MD  5/21/2021

## 2021-05-24 ENCOUNTER — TELEPHONE (OUTPATIENT)
Dept: GASTROENTEROLOGY | Facility: CLINIC | Age: 76
End: 2021-05-24

## 2021-05-24 ENCOUNTER — HOSPITAL ENCOUNTER (OUTPATIENT)
Dept: ULTRASOUND IMAGING | Facility: HOSPITAL | Age: 76
Discharge: HOME | End: 2021-05-24
Attending: INTERNAL MEDICINE
Payer: MEDICARE

## 2021-05-24 DIAGNOSIS — Z09 FOLLOW UP: ICD-10-CM

## 2021-05-24 PROCEDURE — 76705 ECHO EXAM OF ABDOMEN: CPT | Performed by: INTERNAL MEDICINE

## 2021-05-24 NOTE — TELEPHONE ENCOUNTER
Brielle Coulter MD   5/22/2021  1:09 PM CDT Back to Top      See below    Brielle Coulter MD   5/22/2021  1:09 PM CDT       I spoke to Sudha Vidales is feeling well.  She had #4 subcentimeter adenomatous polyps removed.  I have discussed the sig

## 2021-05-24 NOTE — TELEPHONE ENCOUNTER
Health Maintenance Updated. 3 year colonoscopy recall entered into patient outreach in [address] [place] [address] Rd. Next colonoscopy will be due 5/21/2024 per below message from Dr. Sully Richey.

## 2021-05-25 ENCOUNTER — PATIENT MESSAGE (OUTPATIENT)
Dept: NEUROLOGY | Facility: CLINIC | Age: 76
End: 2021-05-25

## 2021-05-25 DIAGNOSIS — E67.2 HYPERVITAMINOSIS B6: Primary | ICD-10-CM

## 2021-05-25 NOTE — TELEPHONE ENCOUNTER
From: Maria Isabel Yip, DO  To: Orlando Berry  Sent: 5/25/2021 8:59 AM CDT  Subject: B6 Levels    Hello,    Thank you for obtaining this B6 blood testing.  We test for B6 levels sometimes when you have lightheadedness or drop in blood pressure with changes in po

## 2021-07-07 ENCOUNTER — LAB ENCOUNTER (OUTPATIENT)
Dept: LAB | Facility: HOSPITAL | Age: 76
End: 2021-07-07
Attending: Other
Payer: MEDICARE

## 2021-07-07 ENCOUNTER — OFFICE VISIT (OUTPATIENT)
Dept: NEUROLOGY | Facility: CLINIC | Age: 76
End: 2021-07-07
Payer: MEDICARE

## 2021-07-07 VITALS
DIASTOLIC BLOOD PRESSURE: 62 MMHG | HEART RATE: 88 BPM | BODY MASS INDEX: 21.99 KG/M2 | HEIGHT: 65 IN | WEIGHT: 132 LBS | SYSTOLIC BLOOD PRESSURE: 132 MMHG

## 2021-07-07 DIAGNOSIS — E67.2 HYPERVITAMINOSIS B6: Primary | ICD-10-CM

## 2021-07-07 DIAGNOSIS — I72.5 BASILAR ARTERY ANEURYSM (HCC): ICD-10-CM

## 2021-07-07 DIAGNOSIS — I67.2 INTRACRANIAL ATHEROSCLEROSIS: ICD-10-CM

## 2021-07-07 DIAGNOSIS — E67.2 HYPERVITAMINOSIS B6: ICD-10-CM

## 2021-07-07 PROCEDURE — 84207 ASSAY OF VITAMIN B-6: CPT

## 2021-07-07 PROCEDURE — 99213 OFFICE O/P EST LOW 20 MIN: CPT | Performed by: OTHER

## 2021-07-07 PROCEDURE — 36415 COLL VENOUS BLD VENIPUNCTURE: CPT

## 2021-07-07 NOTE — PATIENT INSTRUCTIONS
-Please obtain blood work   -Follow up in 1 year or sooner if needed.     -If you develop sudden onset loss of vision, double vision, room spinning/world spinning sensation, inability to speak or understand others who are speaking to you, slurred speech, ba

## 2021-07-07 NOTE — PROGRESS NOTES
Yoni [address]  [address] [facility], [address] [place] [address]  [phone]          Established  Follow Up Visit       Date: July 7, 2021  Patient Name: Florinda Aggarwal   MRN: [id number]  Primary physician: Osman Cm [address] normal.  No rashes or lesions. Head: Normocephalic, atraumatic.     Neurological exam:    Mental Status:   Attention/Concentration: intact attention on bedside test   Fund of knowledge: intact  Speech: no dysarthria or aphasia     LABS/DATA:  Component restriction seen, less likely to be true infarct. Atherosclerosis was noted on CTA 2018 at OSH by neurointerventional in 2018; also personally reviewed this CTA and agree there is scattered intracranial atherosclerosis including L M1.  She is not symptomati

## 2021-07-10 LAB — VITAMIN B6: 69.6 NMOL/L

## 2021-10-21 ENCOUNTER — LAB ENCOUNTER (OUTPATIENT)
Dept: LAB | Facility: HOSPITAL | Age: 76
End: 2021-10-21
Attending: INTERNAL MEDICINE
Payer: MEDICARE

## 2021-10-21 DIAGNOSIS — D50.8 IRON DEFICIENCY ANEMIA SECONDARY TO INADEQUATE DIETARY IRON INTAKE: ICD-10-CM

## 2021-10-21 DIAGNOSIS — E78.2 MIXED HYPERLIPIDEMIA: Primary | ICD-10-CM

## 2021-10-21 PROCEDURE — 85025 COMPLETE CBC W/AUTO DIFF WBC: CPT

## 2021-10-21 PROCEDURE — 80053 COMPREHEN METABOLIC PANEL: CPT

## 2021-10-21 PROCEDURE — 36415 COLL VENOUS BLD VENIPUNCTURE: CPT

## 2021-10-21 PROCEDURE — 80061 LIPID PANEL: CPT

## 2021-11-04 ENCOUNTER — HOSPITAL ENCOUNTER (OUTPATIENT)
Dept: MAMMOGRAPHY | Age: 76
Discharge: HOME | End: 2021-11-04
Attending: OBSTETRICS & GYNECOLOGY

## 2021-11-04 ENCOUNTER — HOSPITAL ENCOUNTER (OUTPATIENT)
Dept: GENERAL RADIOLOGY | Age: 76
Discharge: HOME | End: 2021-11-04
Attending: OBSTETRICS & GYNECOLOGY

## 2021-11-04 DIAGNOSIS — Z78.0 ASYMPTOMATIC MENOPAUSAL STATE: ICD-10-CM

## 2021-11-04 DIAGNOSIS — Z12.31 ENCOUNTER FOR SCREENING MAMMOGRAM FOR MALIGNANT NEOPLASM OF BREAST: ICD-10-CM

## 2021-11-04 PROCEDURE — 77063 BREAST TOMOSYNTHESIS BI: CPT

## 2021-11-04 PROCEDURE — 77080 DXA BONE DENSITY AXIAL: CPT

## 2021-12-23 ENCOUNTER — TELEPHONE (OUTPATIENT)
Dept: GASTROENTEROLOGY | Facility: CLINIC | Age: 76
End: 2021-12-23

## 2021-12-23 NOTE — TELEPHONE ENCOUNTER
Results mailed to patient's home address as requested. If patient would like additional results, will need to contact medical records.

## 2021-12-23 NOTE — TELEPHONE ENCOUNTER
Pt states she never received results from her colonoscopy in May and she would like the results mailed to her home.

## 2022-01-02 ENCOUNTER — IMMUNIZATION (OUTPATIENT)
Dept: LAB | Facility: HOSPITAL | Age: 77
End: 2022-01-02
Attending: EMERGENCY MEDICINE
Payer: MEDICARE

## 2022-01-02 DIAGNOSIS — Z23 NEED FOR VACCINATION: Primary | ICD-10-CM

## 2022-01-02 PROCEDURE — 0004A SARSCOV2 VAC 30MCG/0.3ML IM: CPT

## 2022-01-20 ENCOUNTER — LAB ENCOUNTER (OUTPATIENT)
Dept: LAB | Facility: HOSPITAL | Age: 77
End: 2022-01-20
Attending: INTERNAL MEDICINE
Payer: MEDICARE

## 2022-01-20 DIAGNOSIS — E55.9 VITAMIN D DEFICIENCY: Primary | ICD-10-CM

## 2022-01-20 DIAGNOSIS — E03.9 HYPOTHYROIDISM: ICD-10-CM

## 2022-01-20 LAB
T4 FREE SERPL-MCNC: 1.1 NG/DL (ref 0.8–1.7)
TSI SER-ACNC: 1.34 MIU/ML (ref 0.36–3.74)
VIT B12 SERPL-MCNC: 613 PG/ML (ref 193–986)
VIT D+METAB SERPL-MCNC: 96.5 NG/ML (ref 30–100)

## 2022-01-20 PROCEDURE — 82306 VITAMIN D 25 HYDROXY: CPT

## 2022-01-20 PROCEDURE — 36415 COLL VENOUS BLD VENIPUNCTURE: CPT

## 2022-01-20 PROCEDURE — 84439 ASSAY OF FREE THYROXINE: CPT

## 2022-01-20 PROCEDURE — 82607 VITAMIN B-12: CPT

## 2022-01-20 PROCEDURE — 84443 ASSAY THYROID STIM HORMONE: CPT

## 2022-04-23 ENCOUNTER — LAB ENCOUNTER (OUTPATIENT)
Dept: LAB | Facility: HOSPITAL | Age: 77
End: 2022-04-23
Attending: INTERNAL MEDICINE
Payer: MEDICARE

## 2022-04-23 DIAGNOSIS — D50.8 IRON DEFICIENCY ANEMIA SECONDARY TO INADEQUATE DIETARY IRON INTAKE: ICD-10-CM

## 2022-04-23 DIAGNOSIS — E67.2 HYPERVITAMINOSIS B6: ICD-10-CM

## 2022-04-23 DIAGNOSIS — E78.2 MIXED HYPERLIPIDEMIA: Primary | ICD-10-CM

## 2022-04-23 LAB
ALBUMIN SERPL-MCNC: 4 G/DL (ref 3.4–5)
ALBUMIN/GLOB SERPL: 1.1 {RATIO} (ref 1–2)
ALP LIVER SERPL-CCNC: 74 U/L
ALT SERPL-CCNC: 26 U/L
ANION GAP SERPL CALC-SCNC: 6 MMOL/L (ref 0–18)
AST SERPL-CCNC: 26 U/L (ref 15–37)
BASOPHILS # BLD AUTO: 0.03 X10(3) UL (ref 0–0.2)
BASOPHILS NFR BLD AUTO: 0.8 %
BILIRUB SERPL-MCNC: 0.9 MG/DL (ref 0.1–2)
BUN BLD-MCNC: 12 MG/DL (ref 7–18)
BUN/CREAT SERPL: 14.1 (ref 10–20)
CALCIUM BLD-MCNC: 9.1 MG/DL (ref 8.5–10.1)
CHLORIDE SERPL-SCNC: 107 MMOL/L (ref 98–112)
CHOLEST SERPL-MCNC: 151 MG/DL (ref ?–200)
CO2 SERPL-SCNC: 29 MMOL/L (ref 21–32)
CREAT BLD-MCNC: 0.85 MG/DL
DEPRECATED RDW RBC AUTO: 45.4 FL (ref 35.1–46.3)
EOSINOPHIL # BLD AUTO: 0.02 X10(3) UL (ref 0–0.7)
EOSINOPHIL NFR BLD AUTO: 0.5 %
ERYTHROCYTE [DISTWIDTH] IN BLOOD BY AUTOMATED COUNT: 12.6 % (ref 11–15)
FASTING PATIENT LIPID ANSWER: YES
FASTING STATUS PATIENT QL REPORTED: YES
GLOBULIN PLAS-MCNC: 3.6 G/DL (ref 2.8–4.4)
GLUCOSE BLD-MCNC: 107 MG/DL (ref 70–99)
HCT VFR BLD AUTO: 41.7 %
HDLC SERPL-MCNC: 81 MG/DL (ref 40–59)
HGB BLD-MCNC: 13.6 G/DL
IMM GRANULOCYTES # BLD AUTO: 0.01 X10(3) UL (ref 0–1)
IMM GRANULOCYTES NFR BLD: 0.3 %
LDLC SERPL CALC-MCNC: 59 MG/DL (ref ?–100)
LYMPHOCYTES # BLD AUTO: 1.58 X10(3) UL (ref 1–4)
LYMPHOCYTES NFR BLD AUTO: 40.8 %
MCH RBC QN AUTO: 32.2 PG (ref 26–34)
MCHC RBC AUTO-ENTMCNC: 32.6 G/DL (ref 31–37)
MCV RBC AUTO: 98.8 FL
MONOCYTES # BLD AUTO: 0.28 X10(3) UL (ref 0.1–1)
MONOCYTES NFR BLD AUTO: 7.2 %
NEUTROPHILS # BLD AUTO: 1.95 X10 (3) UL (ref 1.5–7.7)
NEUTROPHILS # BLD AUTO: 1.95 X10(3) UL (ref 1.5–7.7)
NEUTROPHILS NFR BLD AUTO: 50.4 %
NONHDLC SERPL-MCNC: 70 MG/DL (ref ?–130)
OSMOLALITY SERPL CALC.SUM OF ELEC: 294 MOSM/KG (ref 275–295)
PLATELET # BLD AUTO: 159 10(3)UL (ref 150–450)
POTASSIUM SERPL-SCNC: 4.2 MMOL/L (ref 3.5–5.1)
PROT SERPL-MCNC: 7.6 G/DL (ref 6.4–8.2)
RBC # BLD AUTO: 4.22 X10(6)UL
SODIUM SERPL-SCNC: 142 MMOL/L (ref 136–145)
TRIGL SERPL-MCNC: 54 MG/DL (ref 30–149)
VLDLC SERPL CALC-MCNC: 8 MG/DL (ref 0–30)
WBC # BLD AUTO: 3.9 X10(3) UL (ref 4–11)

## 2022-04-23 PROCEDURE — 80061 LIPID PANEL: CPT

## 2022-04-23 PROCEDURE — 85025 COMPLETE CBC W/AUTO DIFF WBC: CPT

## 2022-04-23 PROCEDURE — 36415 COLL VENOUS BLD VENIPUNCTURE: CPT

## 2022-04-23 PROCEDURE — 80053 COMPREHEN METABOLIC PANEL: CPT

## 2022-06-28 ENCOUNTER — OFFICE VISIT (OUTPATIENT)
Dept: NEUROLOGY | Facility: CLINIC | Age: 77
End: 2022-06-28
Payer: MEDICARE

## 2022-06-28 ENCOUNTER — TELEPHONE (OUTPATIENT)
Dept: PHYSICAL MEDICINE AND REHAB | Facility: CLINIC | Age: 77
End: 2022-06-28

## 2022-06-28 VITALS — DIASTOLIC BLOOD PRESSURE: 64 MMHG | SYSTOLIC BLOOD PRESSURE: 128 MMHG | HEART RATE: 82 BPM

## 2022-06-28 DIAGNOSIS — I72.5 BASILAR ARTERY ANEURYSM (HCC): Primary | ICD-10-CM

## 2022-06-28 DIAGNOSIS — I95.1 ORTHOSTATIC HYPOTENSION: ICD-10-CM

## 2022-06-28 DIAGNOSIS — I67.2 INTRACRANIAL ATHEROSCLEROSIS: ICD-10-CM

## 2022-06-28 PROCEDURE — 99212 OFFICE O/P EST SF 10 MIN: CPT | Performed by: OTHER

## 2022-06-28 RX ORDER — CHOLECALCIFEROL (VITAMIN D3) 125 MCG
500 CAPSULE ORAL
COMMUNITY

## 2022-06-28 RX ORDER — ALENDRONATE SODIUM 70 MG/1
70 TABLET ORAL WEEKLY
COMMUNITY
Start: 2022-04-18

## 2022-06-28 NOTE — PATIENT INSTRUCTIONS
-MRA brain in February 2023   -Follow up in 8 months or sooner if needed. -If you develop sudden onset loss of vision, double vision, room spinning/world spinning sensation, inability to speak or understand others who are speaking to you, slurred speech, balance problems, weakness on one side of your body, numbness on one side of your body or worst headache you ever had, please seek out emergency medical attention via 911 for the nearest emergency room to be evaluated for possible stroke. -MyChart or call office with any questions or concerns. Thank you for coming to see me today. The easiest way to communicate with me is via [employer]. [employer] is meant for simple questions regarding medications, possible side effects, or other simple straight forward questions in limited sentences, rather than multiple paragraphs of discussion. [employer] is not meant for, or efficient for these complex questions, extensive questions, extensive medication adjustments, complex new symptoms or concerns. These issues beyond simple questions require a follow up visit with myself. Refills:  Please pay attention to when your refills will need to be renewed. Due to the volume of phone calls daily, this could potentially take a few days, although we certainly try to honor your refill requests as soon as we can. You should call at least 1 week in advance of needing a refill to ensure you do not run out of medication. Keep in mind that refill requests on Fridays may not be filled until the following week.

## 2022-06-28 NOTE — TELEPHONE ENCOUNTER
Per Medicare Guidelines -no authorization is required for imaging     Status: Approved-Covered Benefit     Patient notified via Yvonna Fass can proceed with scheduling Brain MRA CPT 48700

## 2022-08-17 ENCOUNTER — OFFICE VISIT (OUTPATIENT)
Dept: NEUROLOGY | Facility: CLINIC | Age: 77
End: 2022-08-17
Payer: MEDICARE

## 2022-08-17 VITALS
SYSTOLIC BLOOD PRESSURE: 138 MMHG | HEIGHT: 65 IN | HEART RATE: 80 BPM | BODY MASS INDEX: 21.99 KG/M2 | WEIGHT: 132 LBS | DIASTOLIC BLOOD PRESSURE: 72 MMHG

## 2022-08-17 DIAGNOSIS — G50.1 ATYPICAL FACIAL PAIN: ICD-10-CM

## 2022-08-17 DIAGNOSIS — G50.0 TRIGEMINAL NEURALGIA OF RIGHT SIDE OF FACE: Primary | ICD-10-CM

## 2022-08-17 PROCEDURE — 99214 OFFICE O/P EST MOD 30 MIN: CPT | Performed by: OTHER

## 2022-08-17 NOTE — PATIENT INSTRUCTIONS
-We have ordered MRI brain  -Follow up in 4 weeks or sooner if needed. -If you develop sudden onset loss of vision, double vision, room spinning/world spinning sensation, inability to speak or understand others who are speaking to you, slurred speech, balance problems, weakness on one side of your body, numbness on one side of your body or worst headache you ever had, please seek out emergency medical attention via 911 for the nearest emergency room to be evaluated for possible stroke. -MyChart or call office with any questions or concerns. Thank you for coming to see me today. The easiest way to communicate with me is via [employer]. [employer] is meant for simple questions regarding medications, possible side effects, or other simple straight forward questions in limited sentences, rather than multiple paragraphs of discussion. [employer] is not meant for, or efficient for these complex questions, extensive questions, extensive medication adjustments, complex new symptoms or concerns. These issues beyond simple questions require a follow up visit with myself. Refills:  Please pay attention to when your refills will need to be renewed. Due to the volume of phone calls daily, this could potentially take a few days, although we certainly try to honor your refill requests as soon as we can. You should call at least 1 week in advance of needing a refill to ensure you do not run out of medication. Keep in mind that refill requests on Fridays may not be filled until the following week.

## 2022-08-24 ENCOUNTER — HOSPITAL ENCOUNTER (OUTPATIENT)
Dept: MRI IMAGING | Facility: HOSPITAL | Age: 77
Discharge: HOME | End: 2022-08-24
Attending: Other
Payer: MEDICARE

## 2022-08-24 ENCOUNTER — TELEPHONE (OUTPATIENT)
Dept: NEUROLOGY | Facility: CLINIC | Age: 77
End: 2022-08-24

## 2022-08-24 DIAGNOSIS — G50.0 TRIGEMINAL NEURALGIA OF RIGHT SIDE OF FACE: ICD-10-CM

## 2022-08-24 PROCEDURE — 70553 MRI BRAIN STEM W/O & W/DYE: CPT | Performed by: OTHER

## 2022-08-24 PROCEDURE — A9575 INJ GADOTERATE MEGLUMI 0.1ML: HCPCS | Performed by: OTHER

## 2022-08-24 NOTE — TELEPHONE ENCOUNTER
Please call patient and let her know that her MRI brain does not show any strokes or masses, which is good news. One of her blood vessels mildly touches one of her nerves, which is probably what caused her facial pain that she saw me for. I do not think we need to do anything about this now. If her facial pain comes back, we can discuss further management at that time. Overall, the findings are not concerning for a dangerous process.     Thank you

## 2022-08-24 NOTE — TELEPHONE ENCOUNTER
I spoke with the patient and informed her of the information below. Patient verbalized understanding. Pt has a pending appointment on 9/14/22 to follow up. Reviewed and electronically signed by:  [address], [facility]

## 2022-09-14 ENCOUNTER — OFFICE VISIT (OUTPATIENT)
Dept: NEUROLOGY | Facility: CLINIC | Age: 77
End: 2022-09-14
Payer: MEDICARE

## 2022-09-14 VITALS
HEIGHT: 65 IN | BODY MASS INDEX: 20.83 KG/M2 | SYSTOLIC BLOOD PRESSURE: 120 MMHG | WEIGHT: 125 LBS | HEART RATE: 68 BPM | DIASTOLIC BLOOD PRESSURE: 72 MMHG

## 2022-09-14 DIAGNOSIS — G50.0 TRIGEMINAL NEURALGIA OF RIGHT SIDE OF FACE: Primary | ICD-10-CM

## 2022-09-14 PROCEDURE — 99213 OFFICE O/P EST LOW 20 MIN: CPT | Performed by: OTHER

## 2022-09-14 NOTE — PATIENT INSTRUCTIONS
-MRA brain in 6 months   -Follow up in 6 months or sooner if needed. -If you develop sudden onset loss of vision, double vision, room spinning/world spinning sensation, inability to speak or understand others who are speaking to you, slurred speech, balance problems, weakness on one side of your body, numbness on one side of your body or worst headache you ever had, please seek out emergency medical attention via 911 for the nearest emergency room to be evaluated for possible stroke. -MyChart or call office with any questions or concerns. Thank you for coming to see me today. The easiest way to communicate with me is via [employer] is meant for simple questions regarding medications, possible side effects, or other simple straight forward questions in limited sentences, rather than multiple paragraphs of discussion. [employer] is not meant for, or efficient for these complex questions, extensive questions, extensive medication adjustments, complex new symptoms or concerns. These issues beyond simple questions require a follow up visit with myself. Refills:  Please pay attention to when your refills will need to be renewed. Due to the volume of phone calls daily, this could potentially take a few days, although we certainly try to honor your refill requests as soon as we can. You should call at least 1 week in advance of needing a refill to ensure you do not run out of medication. Keep in mind that refill requests on Fridays may not be filled until the following week.

## 2022-10-22 ENCOUNTER — LAB ENCOUNTER (OUTPATIENT)
Dept: LAB | Facility: HOSPITAL | Age: 77
End: 2022-10-22
Attending: INTERNAL MEDICINE
Payer: MEDICARE

## 2022-10-22 DIAGNOSIS — E78.2 MIXED HYPERLIPIDEMIA: ICD-10-CM

## 2022-10-22 DIAGNOSIS — E53.9 VITAMIN B DEFICIENCY: ICD-10-CM

## 2022-10-22 DIAGNOSIS — D50.8 IRON DEFICIENCY ANEMIA SECONDARY TO INADEQUATE DIETARY IRON INTAKE: ICD-10-CM

## 2022-10-22 DIAGNOSIS — I51.9 MYXEDEMA HEART DISEASE: Primary | ICD-10-CM

## 2022-10-22 DIAGNOSIS — E03.9 MYXEDEMA HEART DISEASE: Primary | ICD-10-CM

## 2022-10-22 LAB
ALBUMIN SERPL-MCNC: 4.1 G/DL (ref 3.4–5)
ALBUMIN/GLOB SERPL: 1.3 {RATIO} (ref 1–2)
ALP LIVER SERPL-CCNC: 70 U/L
ALT SERPL-CCNC: 25 U/L
ANION GAP SERPL CALC-SCNC: 4 MMOL/L (ref 0–18)
AST SERPL-CCNC: 24 U/L (ref 15–37)
BASOPHILS # BLD AUTO: 0.03 X10(3) UL (ref 0–0.2)
BASOPHILS NFR BLD AUTO: 0.8 %
BILIRUB SERPL-MCNC: 1.1 MG/DL (ref 0.1–2)
BUN BLD-MCNC: 11 MG/DL (ref 7–18)
BUN/CREAT SERPL: 14.1 (ref 10–20)
CALCIUM BLD-MCNC: 9.2 MG/DL (ref 8.5–10.1)
CHLORIDE SERPL-SCNC: 106 MMOL/L (ref 98–112)
CHOLEST SERPL-MCNC: 138 MG/DL (ref ?–200)
CO2 SERPL-SCNC: 28 MMOL/L (ref 21–32)
CREAT BLD-MCNC: 0.78 MG/DL
DEPRECATED RDW RBC AUTO: 46.4 FL (ref 35.1–46.3)
EOSINOPHIL # BLD AUTO: 0.03 X10(3) UL (ref 0–0.7)
EOSINOPHIL NFR BLD AUTO: 0.8 %
ERYTHROCYTE [DISTWIDTH] IN BLOOD BY AUTOMATED COUNT: 12.4 % (ref 11–15)
FASTING PATIENT LIPID ANSWER: YES
FASTING STATUS PATIENT QL REPORTED: YES
GFR SERPLBLD BASED ON 1.73 SQ M-ARVRAT: 79 ML/MIN/1.73M2 (ref 60–?)
GLOBULIN PLAS-MCNC: 3.1 G/DL (ref 2.8–4.4)
GLUCOSE BLD-MCNC: 107 MG/DL (ref 70–99)
HCT VFR BLD AUTO: 40.6 %
HDLC SERPL-MCNC: 84 MG/DL (ref 40–59)
HGB BLD-MCNC: 13.3 G/DL
IMM GRANULOCYTES # BLD AUTO: 0.01 X10(3) UL (ref 0–1)
IMM GRANULOCYTES NFR BLD: 0.3 %
LDLC SERPL CALC-MCNC: 41 MG/DL (ref ?–100)
LYMPHOCYTES # BLD AUTO: 1.76 X10(3) UL (ref 1–4)
LYMPHOCYTES NFR BLD AUTO: 44.9 %
MCH RBC QN AUTO: 32.8 PG (ref 26–34)
MCHC RBC AUTO-ENTMCNC: 32.8 G/DL (ref 31–37)
MCV RBC AUTO: 100 FL
MONOCYTES # BLD AUTO: 0.27 X10(3) UL (ref 0.1–1)
MONOCYTES NFR BLD AUTO: 6.9 %
NEUTROPHILS # BLD AUTO: 1.82 X10 (3) UL (ref 1.5–7.7)
NEUTROPHILS # BLD AUTO: 1.82 X10(3) UL (ref 1.5–7.7)
NEUTROPHILS NFR BLD AUTO: 46.3 %
NONHDLC SERPL-MCNC: 54 MG/DL (ref ?–130)
OSMOLALITY SERPL CALC.SUM OF ELEC: 286 MOSM/KG (ref 275–295)
PLATELET # BLD AUTO: 151 10(3)UL (ref 150–450)
POTASSIUM SERPL-SCNC: 4 MMOL/L (ref 3.5–5.1)
PROT SERPL-MCNC: 7.2 G/DL (ref 6.4–8.2)
RBC # BLD AUTO: 4.06 X10(6)UL
SODIUM SERPL-SCNC: 138 MMOL/L (ref 136–145)
T4 FREE SERPL-MCNC: 1.1 NG/DL (ref 0.8–1.7)
TRIGL SERPL-MCNC: 63 MG/DL (ref 30–149)
TSI SER-ACNC: 1.14 MIU/ML (ref 0.36–3.74)
VIT B12 SERPL-MCNC: 710 PG/ML (ref 193–986)
VLDLC SERPL CALC-MCNC: 9 MG/DL (ref 0–30)
WBC # BLD AUTO: 3.9 X10(3) UL (ref 4–11)

## 2022-10-22 PROCEDURE — 80061 LIPID PANEL: CPT

## 2022-10-22 PROCEDURE — 82607 VITAMIN B-12: CPT

## 2022-10-22 PROCEDURE — 84439 ASSAY OF FREE THYROXINE: CPT

## 2022-10-22 PROCEDURE — 84443 ASSAY THYROID STIM HORMONE: CPT

## 2022-10-22 PROCEDURE — 36415 COLL VENOUS BLD VENIPUNCTURE: CPT

## 2022-10-22 PROCEDURE — 80053 COMPREHEN METABOLIC PANEL: CPT

## 2022-10-22 PROCEDURE — 85025 COMPLETE CBC W/AUTO DIFF WBC: CPT

## 2022-11-07 ENCOUNTER — HOSPITAL ENCOUNTER (OUTPATIENT)
Dept: CT IMAGING | Age: 77
Discharge: HOME | End: 2022-11-07
Attending: OBSTETRICS & GYNECOLOGY

## 2022-11-07 DIAGNOSIS — Z12.31 VISIT FOR SCREENING MAMMOGRAM: ICD-10-CM

## 2022-11-07 PROCEDURE — 77063 BREAST TOMOSYNTHESIS BI: CPT

## 2023-01-07 ENCOUNTER — IMMUNIZATION (OUTPATIENT)
Dept: LAB | Age: 78
End: 2023-01-07
Attending: EMERGENCY MEDICINE
Payer: MEDICARE

## 2023-01-07 DIAGNOSIS — Z23 NEED FOR VACCINATION: Primary | ICD-10-CM

## 2023-01-07 PROCEDURE — 0124A SARSCOV2 VAC BVL 30MCG/0.3ML: CPT

## 2023-01-25 ENCOUNTER — HOSPITAL ENCOUNTER (OUTPATIENT)
Dept: MRI IMAGING | Facility: HOSPITAL | Age: 78
Discharge: HOME | End: 2023-01-25
Attending: Other
Payer: MEDICARE

## 2023-01-25 ENCOUNTER — LAB ENCOUNTER (OUTPATIENT)
Dept: LAB | Facility: HOSPITAL | Age: 78
End: 2023-01-25
Attending: Other
Payer: MEDICARE

## 2023-01-25 DIAGNOSIS — E55.9 VITAMIN D DEFICIENCY: Primary | ICD-10-CM

## 2023-01-25 DIAGNOSIS — G50.0 TRIGEMINAL NEURALGIA OF RIGHT SIDE OF FACE: ICD-10-CM

## 2023-01-25 DIAGNOSIS — G50.1 ATYPICAL FACIAL PAIN: ICD-10-CM

## 2023-01-25 LAB — VIT D+METAB SERPL-MCNC: 49.3 NG/ML (ref 30–100)

## 2023-01-25 PROCEDURE — 82306 VITAMIN D 25 HYDROXY: CPT

## 2023-01-25 PROCEDURE — 70544 MR ANGIOGRAPHY HEAD W/O DYE: CPT | Performed by: OTHER

## 2023-01-25 PROCEDURE — 36415 COLL VENOUS BLD VENIPUNCTURE: CPT

## 2023-03-01 ENCOUNTER — OFFICE VISIT (OUTPATIENT)
Dept: NEUROLOGY | Facility: CLINIC | Age: 78
End: 2023-03-01
Payer: MEDICARE

## 2023-03-01 VITALS
BODY MASS INDEX: 20.33 KG/M2 | WEIGHT: 122 LBS | HEIGHT: 65 IN | DIASTOLIC BLOOD PRESSURE: 64 MMHG | HEART RATE: 60 BPM | SYSTOLIC BLOOD PRESSURE: 112 MMHG

## 2023-03-01 DIAGNOSIS — I72.5 BASILAR ARTERY ANEURYSM (HCC): Primary | ICD-10-CM

## 2023-03-01 DIAGNOSIS — G50.0 TRIGEMINAL NEURALGIA OF RIGHT SIDE OF FACE: ICD-10-CM

## 2023-03-01 DIAGNOSIS — I67.2 INTRACRANIAL ATHEROSCLEROSIS: ICD-10-CM

## 2023-03-01 PROCEDURE — 99214 OFFICE O/P EST MOD 30 MIN: CPT | Performed by: OTHER

## 2023-03-01 NOTE — PATIENT INSTRUCTIONS
-Follow up in 1 year or sooner if needed. -If you develop sudden onset loss of vision, double vision, room spinning/world spinning sensation, inability to speak or understand others who are speaking to you, slurred speech, balance problems, weakness on one side of your body, numbness on one side of your body or worst headache you ever had, please seek out emergency medical attention via 911 for the nearest emergency room to be evaluated for possible stroke. -MyChart or call office with any questions or concerns. Thank you for coming to see me today. The easiest way to communicate with me is via [employer]. [employer] is meant for simple questions regarding medications, possible side effects, or other simple straight forward questions in limited sentences, rather than multiple paragraphs of discussion. [employer] is not meant for, or efficient for these complex questions, extensive questions, extensive medication adjustments, complex new symptoms or concerns. These issues beyond simple questions require a follow up visit with myself. Refills:  Please pay attention to when your refills will need to be renewed. Due to the volume of phone calls daily, this could potentially take a few days, although we certainly try to honor your refill requests as soon as we can. You should call at least 1 week in advance of needing a refill to ensure you do not run out of medication. Keep in mind that refill requests on Fridays may not be filled until the following week.

## 2023-04-22 ENCOUNTER — LAB ENCOUNTER (OUTPATIENT)
Dept: LAB | Facility: HOSPITAL | Age: 78
End: 2023-04-22
Attending: INTERNAL MEDICINE
Payer: MEDICARE

## 2023-04-22 DIAGNOSIS — E03.9 ACQUIRED HYPOTHYROIDISM: ICD-10-CM

## 2023-04-22 DIAGNOSIS — D50.8 IRON DEFICIENCY ANEMIA SECONDARY TO INADEQUATE DIETARY IRON INTAKE: ICD-10-CM

## 2023-04-22 DIAGNOSIS — E78.00 PURE HYPERCHOLESTEROLEMIA: ICD-10-CM

## 2023-04-22 DIAGNOSIS — E55.9 AVITAMINOSIS D: Primary | ICD-10-CM

## 2023-04-22 LAB
ALBUMIN SERPL-MCNC: 4.1 G/DL (ref 3.4–5)
ALBUMIN/GLOB SERPL: 1.2 {RATIO} (ref 1–2)
ALP LIVER SERPL-CCNC: 59 U/L
ALT SERPL-CCNC: 22 U/L
ANION GAP SERPL CALC-SCNC: 6 MMOL/L (ref 0–18)
AST SERPL-CCNC: 24 U/L (ref 15–37)
BASOPHILS # BLD AUTO: 0.02 X10(3) UL (ref 0–0.2)
BASOPHILS NFR BLD AUTO: 0.5 %
BILIRUB SERPL-MCNC: 1.3 MG/DL (ref 0.1–2)
BUN BLD-MCNC: 10 MG/DL (ref 7–18)
BUN/CREAT SERPL: 12.2 (ref 10–20)
CALCIUM BLD-MCNC: 9 MG/DL (ref 8.5–10.1)
CHLORIDE SERPL-SCNC: 102 MMOL/L (ref 98–112)
CHOLEST SERPL-MCNC: 200 MG/DL (ref ?–200)
CO2 SERPL-SCNC: 29 MMOL/L (ref 21–32)
CREAT BLD-MCNC: 0.82 MG/DL
DEPRECATED RDW RBC AUTO: 45.1 FL (ref 35.1–46.3)
EOSINOPHIL # BLD AUTO: 0.03 X10(3) UL (ref 0–0.7)
EOSINOPHIL NFR BLD AUTO: 0.7 %
ERYTHROCYTE [DISTWIDTH] IN BLOOD BY AUTOMATED COUNT: 12.6 % (ref 11–15)
FASTING PATIENT LIPID ANSWER: YES
FASTING STATUS PATIENT QL REPORTED: YES
GFR SERPLBLD BASED ON 1.73 SQ M-ARVRAT: 74 ML/MIN/1.73M2 (ref 60–?)
GLOBULIN PLAS-MCNC: 3.5 G/DL (ref 2.8–4.4)
GLUCOSE BLD-MCNC: 115 MG/DL (ref 70–99)
HCT VFR BLD AUTO: 40.7 %
HDLC SERPL-MCNC: 102 MG/DL (ref 40–59)
HGB BLD-MCNC: 13.4 G/DL
IMM GRANULOCYTES # BLD AUTO: 0.01 X10(3) UL (ref 0–1)
IMM GRANULOCYTES NFR BLD: 0.2 %
LDLC SERPL CALC-MCNC: 85 MG/DL (ref ?–100)
LYMPHOCYTES # BLD AUTO: 2.02 X10(3) UL (ref 1–4)
LYMPHOCYTES NFR BLD AUTO: 48.7 %
MCH RBC QN AUTO: 31.9 PG (ref 26–34)
MCHC RBC AUTO-ENTMCNC: 32.9 G/DL (ref 31–37)
MCV RBC AUTO: 96.9 FL
MONOCYTES # BLD AUTO: 0.26 X10(3) UL (ref 0.1–1)
MONOCYTES NFR BLD AUTO: 6.3 %
NEUTROPHILS # BLD AUTO: 1.81 X10 (3) UL (ref 1.5–7.7)
NEUTROPHILS # BLD AUTO: 1.81 X10(3) UL (ref 1.5–7.7)
NEUTROPHILS NFR BLD AUTO: 43.6 %
NONHDLC SERPL-MCNC: 98 MG/DL (ref ?–130)
OSMOLALITY SERPL CALC.SUM OF ELEC: 284 MOSM/KG (ref 275–295)
PLATELET # BLD AUTO: 166 10(3)UL (ref 150–450)
POTASSIUM SERPL-SCNC: 3.9 MMOL/L (ref 3.5–5.1)
PROT SERPL-MCNC: 7.6 G/DL (ref 6.4–8.2)
RBC # BLD AUTO: 4.2 X10(6)UL
SODIUM SERPL-SCNC: 137 MMOL/L (ref 136–145)
T4 FREE SERPL-MCNC: 1.1 NG/DL (ref 0.8–1.7)
TRIGL SERPL-MCNC: 71 MG/DL (ref 30–149)
TSI SER-ACNC: 1.3 MIU/ML (ref 0.36–3.74)
VIT B12 SERPL-MCNC: 916 PG/ML (ref 193–986)
VLDLC SERPL CALC-MCNC: 11 MG/DL (ref 0–30)
WBC # BLD AUTO: 4.2 X10(3) UL (ref 4–11)

## 2023-04-22 PROCEDURE — 85025 COMPLETE CBC W/AUTO DIFF WBC: CPT

## 2023-04-22 PROCEDURE — 84439 ASSAY OF FREE THYROXINE: CPT

## 2023-04-22 PROCEDURE — 80061 LIPID PANEL: CPT

## 2023-04-22 PROCEDURE — 82607 VITAMIN B-12: CPT

## 2023-04-22 PROCEDURE — 80053 COMPREHEN METABOLIC PANEL: CPT

## 2023-04-22 PROCEDURE — 36415 COLL VENOUS BLD VENIPUNCTURE: CPT

## 2023-04-22 PROCEDURE — 84443 ASSAY THYROID STIM HORMONE: CPT

## 2023-07-22 ENCOUNTER — LAB ENCOUNTER (OUTPATIENT)
Dept: LAB | Facility: HOSPITAL | Age: 78
End: 2023-07-22
Attending: INTERNAL MEDICINE
Payer: MEDICARE

## 2023-07-22 DIAGNOSIS — R73.9 BLOOD GLUCOSE ELEVATED: ICD-10-CM

## 2023-07-22 DIAGNOSIS — E78.2 MIXED HYPERLIPIDEMIA: Primary | ICD-10-CM

## 2023-07-22 LAB
ALBUMIN SERPL-MCNC: 4.1 G/DL (ref 3.4–5)
ALBUMIN/GLOB SERPL: 1.2 {RATIO} (ref 1–2)
ALP LIVER SERPL-CCNC: 62 U/L
ALT SERPL-CCNC: 23 U/L
ANION GAP SERPL CALC-SCNC: 8 MMOL/L (ref 0–18)
AST SERPL-CCNC: 23 U/L (ref 15–37)
BILIRUB SERPL-MCNC: 1 MG/DL (ref 0.1–2)
BUN BLD-MCNC: 14 MG/DL (ref 7–18)
BUN/CREAT SERPL: 15.9 (ref 10–20)
CALCIUM BLD-MCNC: 8.9 MG/DL (ref 8.5–10.1)
CHLORIDE SERPL-SCNC: 105 MMOL/L (ref 98–112)
CHOLEST SERPL-MCNC: 161 MG/DL (ref ?–200)
CO2 SERPL-SCNC: 27 MMOL/L (ref 21–32)
CREAT BLD-MCNC: 0.88 MG/DL
EGFRCR SERPLBLD CKD-EPI 2021: 68 ML/MIN/1.73M2 (ref 60–?)
EST. AVERAGE GLUCOSE BLD GHB EST-MCNC: 120 MG/DL (ref 68–126)
FASTING PATIENT LIPID ANSWER: YES
FASTING STATUS PATIENT QL REPORTED: YES
GLOBULIN PLAS-MCNC: 3.3 G/DL (ref 2.8–4.4)
GLUCOSE BLD-MCNC: 110 MG/DL (ref 70–99)
HBA1C MFR BLD: 5.8 % (ref ?–5.7)
HDLC SERPL-MCNC: 95 MG/DL (ref 40–59)
LDLC SERPL CALC-MCNC: 53 MG/DL (ref ?–100)
NONHDLC SERPL-MCNC: 66 MG/DL (ref ?–130)
OSMOLALITY SERPL CALC.SUM OF ELEC: 291 MOSM/KG (ref 275–295)
POTASSIUM SERPL-SCNC: 3.7 MMOL/L (ref 3.5–5.1)
PROT SERPL-MCNC: 7.4 G/DL (ref 6.4–8.2)
SODIUM SERPL-SCNC: 140 MMOL/L (ref 136–145)
TRIGL SERPL-MCNC: 70 MG/DL (ref 30–149)
VLDLC SERPL CALC-MCNC: 10 MG/DL (ref 0–30)

## 2023-07-22 PROCEDURE — 80053 COMPREHEN METABOLIC PANEL: CPT

## 2023-07-22 PROCEDURE — 80061 LIPID PANEL: CPT

## 2023-07-22 PROCEDURE — 83036 HEMOGLOBIN GLYCOSYLATED A1C: CPT

## 2023-07-22 PROCEDURE — 36415 COLL VENOUS BLD VENIPUNCTURE: CPT

## 2023-10-25 DIAGNOSIS — Z12.31 VISIT FOR SCREENING MAMMOGRAM: Primary | ICD-10-CM

## 2023-10-26 DIAGNOSIS — N89.8 VAGINAL DISCHARGE: Primary | ICD-10-CM

## 2023-11-06 ENCOUNTER — HOSPITAL ENCOUNTER (OUTPATIENT)
Dept: BONE DENSITY | Age: 78
Discharge: HOME | End: 2023-11-06
Attending: INTERNAL MEDICINE
Payer: MEDICARE

## 2023-11-06 DIAGNOSIS — M81.0 OSTEOPOROSIS: ICD-10-CM

## 2023-11-06 PROCEDURE — 77080 DXA BONE DENSITY AXIAL: CPT | Performed by: INTERNAL MEDICINE

## 2023-11-08 ENCOUNTER — HOSPITAL ENCOUNTER (OUTPATIENT)
Dept: CT IMAGING | Age: 78
Discharge: HOME | End: 2023-11-08
Attending: OBSTETRICS & GYNECOLOGY

## 2023-11-08 ENCOUNTER — LAB ENCOUNTER (OUTPATIENT)
Dept: LAB | Facility: HOSPITAL | Age: 78
End: 2023-11-08
Attending: INTERNAL MEDICINE
Payer: MEDICARE

## 2023-11-08 DIAGNOSIS — Z12.31 VISIT FOR SCREENING MAMMOGRAM: ICD-10-CM

## 2023-11-08 DIAGNOSIS — E78.2 HYPERLIPEMIA, MIXED: ICD-10-CM

## 2023-11-08 DIAGNOSIS — E55.9 VITAMIN D DEFICIENCY: ICD-10-CM

## 2023-11-08 DIAGNOSIS — R73.9 HYPERGLYCEMIA: ICD-10-CM

## 2023-11-08 DIAGNOSIS — D50.8 CHLOROTIC ANEMIA: ICD-10-CM

## 2023-11-08 DIAGNOSIS — E53.8 VITAMIN B12 DEFICIENCY: Primary | ICD-10-CM

## 2023-11-08 LAB
ALBUMIN SERPL-MCNC: 4.6 G/DL (ref 3.2–4.8)
ALBUMIN/GLOB SERPL: 1.6 {RATIO} (ref 1–2)
ALP LIVER SERPL-CCNC: 52 U/L
ALT SERPL-CCNC: 17 U/L
ANION GAP SERPL CALC-SCNC: 4 MMOL/L (ref 0–18)
AST SERPL-CCNC: 31 U/L (ref ?–34)
BASOPHILS # BLD AUTO: 0.03 X10(3) UL (ref 0–0.2)
BASOPHILS NFR BLD AUTO: 0.8 %
BILIRUB SERPL-MCNC: 1.2 MG/DL (ref 0.2–1.1)
BUN BLD-MCNC: 8 MG/DL (ref 9–23)
BUN/CREAT SERPL: 9.8 (ref 10–20)
CALCIUM BLD-MCNC: 9.5 MG/DL (ref 8.7–10.4)
CHLORIDE SERPL-SCNC: 106 MMOL/L (ref 98–112)
CHOLEST SERPL-MCNC: 162 MG/DL (ref ?–200)
CO2 SERPL-SCNC: 28 MMOL/L (ref 21–32)
CREAT BLD-MCNC: 0.82 MG/DL
DEPRECATED RDW RBC AUTO: 44.2 FL (ref 35.1–46.3)
EGFRCR SERPLBLD CKD-EPI 2021: 74 ML/MIN/1.73M2 (ref 60–?)
EOSINOPHIL # BLD AUTO: 0.02 X10(3) UL (ref 0–0.7)
EOSINOPHIL NFR BLD AUTO: 0.5 %
ERYTHROCYTE [DISTWIDTH] IN BLOOD BY AUTOMATED COUNT: 12.4 % (ref 11–15)
EST. AVERAGE GLUCOSE BLD GHB EST-MCNC: 123 MG/DL (ref 68–126)
FASTING PATIENT LIPID ANSWER: YES
FASTING STATUS PATIENT QL REPORTED: YES
GLOBULIN PLAS-MCNC: 2.8 G/DL (ref 2.8–4.4)
GLUCOSE BLD-MCNC: 102 MG/DL (ref 70–99)
HBA1C MFR BLD: 5.9 % (ref ?–5.7)
HCT VFR BLD AUTO: 40.8 %
HDLC SERPL-MCNC: 84 MG/DL (ref 40–59)
HGB BLD-MCNC: 13.7 G/DL
IMM GRANULOCYTES # BLD AUTO: 0.01 X10(3) UL (ref 0–1)
IMM GRANULOCYTES NFR BLD: 0.3 %
IRON SATN MFR SERPL: 28 %
IRON SERPL-MCNC: 96 UG/DL
LDLC SERPL CALC-MCNC: 65 MG/DL (ref ?–100)
LYMPHOCYTES # BLD AUTO: 1.55 X10(3) UL (ref 1–4)
LYMPHOCYTES NFR BLD AUTO: 41 %
MCH RBC QN AUTO: 32.5 PG (ref 26–34)
MCHC RBC AUTO-ENTMCNC: 33.6 G/DL (ref 31–37)
MCV RBC AUTO: 96.7 FL
MONOCYTES # BLD AUTO: 0.27 X10(3) UL (ref 0.1–1)
MONOCYTES NFR BLD AUTO: 7.1 %
NEUTROPHILS # BLD AUTO: 1.9 X10 (3) UL (ref 1.5–7.7)
NEUTROPHILS # BLD AUTO: 1.9 X10(3) UL (ref 1.5–7.7)
NEUTROPHILS NFR BLD AUTO: 50.3 %
NONHDLC SERPL-MCNC: 78 MG/DL (ref ?–130)
OSMOLALITY SERPL CALC.SUM OF ELEC: 285 MOSM/KG (ref 275–295)
PLATELET # BLD AUTO: 153 10(3)UL (ref 150–450)
POTASSIUM SERPL-SCNC: 4.2 MMOL/L (ref 3.5–5.1)
PROT SERPL-MCNC: 7.4 G/DL (ref 5.7–8.2)
RBC # BLD AUTO: 4.22 X10(6)UL
SODIUM SERPL-SCNC: 138 MMOL/L (ref 136–145)
TIBC SERPL-MCNC: 341 UG/DL (ref 250–425)
TRANSFERRIN SERPL-MCNC: 229 MG/DL (ref 250–380)
TRIGL SERPL-MCNC: 67 MG/DL (ref 30–149)
VIT B12 SERPL-MCNC: 969 PG/ML (ref 211–911)
VLDLC SERPL CALC-MCNC: 10 MG/DL (ref 0–30)
WBC # BLD AUTO: 3.8 X10(3) UL (ref 4–11)

## 2023-11-08 PROCEDURE — 80061 LIPID PANEL: CPT

## 2023-11-08 PROCEDURE — 84466 ASSAY OF TRANSFERRIN: CPT

## 2023-11-08 PROCEDURE — 83540 ASSAY OF IRON: CPT

## 2023-11-08 PROCEDURE — 36415 COLL VENOUS BLD VENIPUNCTURE: CPT

## 2023-11-08 PROCEDURE — 83036 HEMOGLOBIN GLYCOSYLATED A1C: CPT

## 2023-11-08 PROCEDURE — 82607 VITAMIN B-12: CPT

## 2023-11-08 PROCEDURE — 80053 COMPREHEN METABOLIC PANEL: CPT

## 2023-11-08 PROCEDURE — 77063 BREAST TOMOSYNTHESIS BI: CPT

## 2023-11-08 PROCEDURE — 85025 COMPLETE CBC W/AUTO DIFF WBC: CPT

## 2023-11-17 ENCOUNTER — LAB ENCOUNTER (OUTPATIENT)
Dept: LAB | Facility: HOSPITAL | Age: 78
End: 2023-11-17
Attending: INTERNAL MEDICINE
Payer: MEDICARE

## 2023-11-17 DIAGNOSIS — R30.0 DYSURIA: Primary | ICD-10-CM

## 2023-11-17 LAB
BILIRUB UR QL: NEGATIVE
COLOR UR: YELLOW
GLUCOSE UR-MCNC: NORMAL MG/DL
HYALINE CASTS #/AREA URNS AUTO: PRESENT /LPF
KETONES UR-MCNC: NEGATIVE MG/DL
LEUKOCYTE ESTERASE UR QL STRIP.AUTO: 250
NITRITE UR QL STRIP.AUTO: NEGATIVE
PH UR: 6 [PH] (ref 5–8)
PROT UR-MCNC: NEGATIVE MG/DL
RBC #/AREA URNS AUTO: >10 /HPF
SP GR UR STRIP: 1.01 (ref 1–1.03)
UROBILINOGEN UR STRIP-ACNC: NORMAL

## 2023-11-17 PROCEDURE — 87077 CULTURE AEROBIC IDENTIFY: CPT

## 2023-11-17 PROCEDURE — 81001 URINALYSIS AUTO W/SCOPE: CPT

## 2023-11-17 PROCEDURE — 87086 URINE CULTURE/COLONY COUNT: CPT

## 2023-11-17 PROCEDURE — 87186 SC STD MICRODIL/AGAR DIL: CPT

## 2024-02-26 ENCOUNTER — OFFICE VISIT (OUTPATIENT)
Facility: CLINIC | Age: 79
End: 2024-02-26
Payer: MEDICARE

## 2024-02-26 ENCOUNTER — TELEPHONE (OUTPATIENT)
Facility: CLINIC | Age: 79
End: 2024-02-26

## 2024-02-26 VITALS
SYSTOLIC BLOOD PRESSURE: 143 MMHG | BODY MASS INDEX: 20.16 KG/M2 | DIASTOLIC BLOOD PRESSURE: 68 MMHG | HEIGHT: 65 IN | WEIGHT: 121 LBS | HEART RATE: 82 BPM

## 2024-02-26 DIAGNOSIS — Z86.010 HISTORY OF COLON POLYPS: Primary | ICD-10-CM

## 2024-02-26 DIAGNOSIS — Z86.010 PERSONAL HISTORY OF COLONIC POLYPS: ICD-10-CM

## 2024-02-26 DIAGNOSIS — R19.4 CHANGE IN BOWEL HABITS: ICD-10-CM

## 2024-02-26 DIAGNOSIS — R19.4 CHANGE IN BOWEL HABITS: Primary | ICD-10-CM

## 2024-02-26 PROCEDURE — 99213 OFFICE O/P EST LOW 20 MIN: CPT | Performed by: INTERNAL MEDICINE

## 2024-02-26 RX ORDER — POLYETHYLENE GLYCOL 3350, SODIUM CHLORIDE, SODIUM BICARBONATE, POTASSIUM CHLORIDE 420; 11.2; 5.72; 1.48 G/4L; G/4L; G/4L; G/4L
4 POWDER, FOR SOLUTION ORAL ONCE
Qty: 4000 ML | Refills: 0 | Status: SHIPPED | OUTPATIENT
Start: 2024-02-26 | End: 2024-02-26

## 2024-02-26 RX ORDER — ATORVASTATIN CALCIUM 10 MG/1
TABLET, FILM COATED ORAL
COMMUNITY
Start: 2023-05-02

## 2024-02-26 NOTE — TELEPHONE ENCOUNTER
PPD PA-    Patient is scheduled for Colonoscopy within the next two weeks, 2/29/2024 at [facility] please check for PA.    Thank you!

## 2024-02-26 NOTE — TELEPHONE ENCOUNTER
Scheduled for:  Colonoscopy 84469  Provider Name:  Dr Silva  Date:  2/29/2024  Location:  [facility]  Sedation:  MAC  Time:  8:15 am. (pt is aware to arrive at 7:15 am)  Prep:  Trilyte  Meds/Allergies Reconciled?:  Physician Reviewed  Diagnosis with codes:    Change in bowel habits R19.4  Hx of Colon Polyps Z86.010  Was patient informed to call insurance with codes (Y/N):  Yes  Referral sent?:  Referral was sent at the time of electronic surgical scheduling.  EM or [facility] notified?:  I sent an electronic request to Endo Scheduling and received a confirmation today.  Medication Orders:  Patient is aware to NOT take iron pills, herbal meds and diet supplements for 7 days before exam. Also to NOT take any form of alcohol, recreational drugs and any forms of ED meds 24-72 hours before exam.   Misc Orders:  N/A   Further instructions given by staff:  I discussed the prep instructions with the patient which she verbally understood. I provided patient with prep instruction's sheet in office.      Patient was informed about the new cancellation policy for his/her procedure. Patient was also given a copy of the cancellation policy at the time of the appointment and verbalized understanding.

## 2024-02-26 NOTE — PROGRESS NOTES
Subjective:   Patient ID: Quynh Krishnamurthy is a 78 year old female.    HPI  Quynh returns in follow-up along with her  Ty.  She was last seen at the time of endoscopy in May 2021.    As per previous notes the patient underwent a colonoscopy and upper endoscopy in May 2021 for colorectal cancer screening/surveillance in the setting of a history of colon polyps and an upper endoscopy for a history of gastric polyps and postprandial eructation.  She was found to have #4 subcentimeter tubular adenomas of the colon, uncomplicated diverticulosis of the right colon and a small hiatal hernia.  Gastric biopsies were negative.  A 3-year surveillance colonoscopy was advised.    Quynh has lost approximately 20 pounds of weight over the past several years since discontinuing ice cream on the advice of her physician.  He has encouraged her to liberalize her diet.  Her appetite is good.  She has had dental issues and finds it difficult to chew hard foods.  She denies dysphagia or odynophagia.  She has occasional heartburn, belching and postprandial fullness but not on a consistent basis.  She denies abdominal pain.  She has noted some constipation with passage of smaller volume stools that require #3 bowel movements to evacuate.  Stools can be dark on prunes and blueberries.  She has noted no visible bleeding.      History/Other:   Review of Systems  See above    Wt Readings from Last 7 Encounters:   02/26/24 121 lb (54.9 kg)   03/01/23 122 lb (55.3 kg)   09/14/22 125 lb (56.7 kg)   08/17/22 132 lb (59.9 kg)   07/07/21 132 lb (59.9 kg)   02/22/21 139 lb (63 kg)   01/06/21 130 lb (59 kg)       Current Outpatient Medications   Medication Sig Dispense Refill    atorvastatin 10 MG Oral Tab       PEG 3350-KCl-Na Bicarb-NaCl (TRILYTE) 420 g Oral Recon Soln Take 4,000 mL (4 L total) by mouth one time for 1 dose. 4000 mL 0    alendronate 70 MG Oral Tab Take 1 tablet (70 mg total) by mouth once a week.      cyanocobalamine 500 MCG Oral  Tab Take 1 tablet (500 mcg total) by mouth 3 (three) times a week.      clotrimazole-betamethasone 1-0.05 % External Cream PRN      Aspirin (ASPIR-LOW OR) Take 81 mg by mouth 3 (three) times a week.      Midodrine HCl 5 MG Oral Tab Take 1 tablet (5 mg total) by mouth in the morning and 1 tablet (5 mg total) before bedtime.      ferrous sulfate 325 (65 FE) MG Oral Tab EC Take 1 tablet (325 mg total) by mouth daily with breakfast. 30 tablet 0    ergocalciferol 56981 units Oral Cap Take 1 capsule (50,000 Units total) by mouth every 30 (thirty) days.      Calcium Carbonate-Vitamin D 500-400 MG-UNIT Oral Tab Take 1 tablet by mouth daily.      atorvastatin 20 MG Oral Tab Take 1 tablet (20 mg total) by mouth After dinner. (Patient not taking: Reported on 2/26/2024)       Allergies:  Allergies   Allergen Reactions    Morphine NAUSEA AND VOMITING    Tegaderm Ag Mesh 2\"X2\" [Wound Dressings] UNKNOWN     Skin Bubbles up       Objective:   Physical Exam  Vitals and nursing note reviewed.   Constitutional:       General: She is not in acute distress.     Appearance: She is well-developed. She is not ill-appearing, toxic-appearing or diaphoretic.   HENT:      Head: Normocephalic and atraumatic.      Mouth/Throat:      Pharynx: No oropharyngeal exudate.   Eyes:      General: No scleral icterus.     Conjunctiva/sclera: Conjunctivae normal.   Neck:      Thyroid: No thyromegaly.   Cardiovascular:      Rate and Rhythm: Normal rate and regular rhythm.      Heart sounds: Normal heart sounds.   Pulmonary:      Effort: Pulmonary effort is normal. No respiratory distress.      Breath sounds: Normal breath sounds. No wheezing or rales.   Abdominal:      General: Bowel sounds are normal. There is no distension.      Palpations: Abdomen is soft. There is no mass.      Tenderness: There is no abdominal tenderness. There is no guarding or rebound.   Musculoskeletal:      Cervical back: Neck supple.   Lymphadenopathy:      Cervical: No cervical  adenopathy.   Neurological:      Mental Status: She is alert and oriented to person, place, and time.   Psychiatric:         Behavior: Behavior normal.         Component      Latest Ref Rng 11/8/2023   WBC      4.0 - 11.0 x10(3) uL 3.8 (L)    RBC      3.80 - 5.30 x10(6)uL 4.22    Hemoglobin      12.0 - 16.0 g/dL 13.7    Hematocrit      35.0 - 48.0 % 40.8    MCV      80.0 - 100.0 fL 96.7    MCH      26.0 - 34.0 pg 32.5    MCHC      31.0 - 37.0 g/dL 33.6    RDW-SD      35.1 - 46.3 fL 44.2    RDW      11.0 - 15.0 % 12.4    Platelet Count      150.0 - 450.0 10(3)uL 153.0    Prelim Neutrophil Abs      1.50 - 7.70 x10 (3) uL 1.90    Neutrophils Absolute      1.50 - 7.70 x10(3) uL 1.90    Lymphocytes Absolute      1.00 - 4.00 x10(3) uL 1.55    Monocytes Absolute      0.10 - 1.00 x10(3) uL 0.27    Eosinophils Absolute      0.00 - 0.70 x10(3) uL 0.02    Basophils Absolute      0.00 - 0.20 x10(3) uL 0.03    Immature Granulocyte Absolute      0.00 - 1.00 x10(3) uL 0.01    Neutrophils %      % 50.3    Lymphocytes %      % 41.0    Monocytes %      % 7.1    Eosinophils %      % 0.5    Basophils %      % 0.8    Immature Granulocyte %      % 0.3    Glucose      70 - 99 mg/dL 102 (H)    Sodium      136 - 145 mmol/L 138    Potassium      3.5 - 5.1 mmol/L 4.2    Chloride      98 - 112 mmol/L 106    Carbon Dioxide, Total      21.0 - 32.0 mmol/L 28.0    ANION GAP      0 - 18 mmol/L 4    BUN      9 - 23 mg/dL 8 (L)    CREATININE      0.55 - 1.02 mg/dL 0.82    BUN/CREATININE RATIO      10.0 - 20.0  9.8 (L)    CALCIUM      8.7 - 10.4 mg/dL 9.5    CALCULATED OSMOLALITY      275 - 295 mOsm/kg 285    EGFR      >=60 mL/min/1.73m2 74    ALT (SGPT)      10 - 49 U/L 17    AST (SGOT)      <=34 U/L 31    ALKALINE PHOSPHATASE      55 - 142 U/L 52 (L)    Total Bilirubin      0.2 - 1.1 mg/dL 1.2 (H)    PROTEIN, TOTAL      5.7 - 8.2 g/dL 7.4    Albumin      3.2 - 4.8 g/dL 4.6    Globulin      2.8 - 4.4 g/dL 2.8    A/G Ratio      1.0 - 2.0  1.6     Patient Fasting for CMP? Yes    Iron, Serum      50 - 170 ug/dL 96    Transferrin      250 - 380 mg/dL 229 (L)    Iron Bind.Cap.(TIBC)      250 - 425 ug/dL 341    Iron Saturation      15 - 50 % 28    Vitamin B12      211 - 911 pg/mL 969 (H)       Legend:  (L) Low  (H) High    Assessment & Plan:   1. History of colon polyps    2. Change in bowel habits    The patient has noted episodic constipation which is likely due to a functional cause such as outlet delay.  I doubt structural lesions.  She had #4 subcentimeter adenomatous polyps removed endoscopically 3 years prior.  She is due for a surveillance examination.  We have discussed proceeding with a colonoscopy versus observation in light of age.  The patient strongly wishes to proceed with a colonoscopy.  She will schedule the procedure following a split dose TriLyte preparation and monitored anesthesia care.  I do not feel that a follow-up upper endoscopy is indicated unless new symptoms/signs dictate.        Meds This Visit:  Requested Prescriptions     Signed Prescriptions Disp Refills    PEG 3350-KCl-Na Bicarb-NaCl (TRILYTE) 420 g Oral Recon Soln 4000 mL 0     Sig: Take 4,000 mL (4 L total) by mouth one time for 1 dose.       Imaging & Referrals:  None

## 2024-02-26 NOTE — PATIENT INSTRUCTIONS
Schedule colonoscopy for a change in bowel habits and for a history of colon polyps following a split dose TriLyte preparation and monitored anesthesia care.

## 2024-02-29 ENCOUNTER — ANESTHESIA EVENT (OUTPATIENT)
Dept: ENDOSCOPY | Age: 79
End: 2024-02-29
Payer: MEDICARE

## 2024-02-29 ENCOUNTER — ANESTHESIA (OUTPATIENT)
Dept: ENDOSCOPY | Age: 79
End: 2024-02-29
Payer: MEDICARE

## 2024-02-29 ENCOUNTER — HOSPITAL ENCOUNTER (OUTPATIENT)
Age: 79
Setting detail: HOSPITAL OUTPATIENT SURGERY
Discharge: HOME | End: 2024-02-29
Attending: INTERNAL MEDICINE | Admitting: INTERNAL MEDICINE
Payer: MEDICARE

## 2024-02-29 VITALS
DIASTOLIC BLOOD PRESSURE: 85 MMHG | WEIGHT: 120 LBS | OXYGEN SATURATION: 100 % | HEIGHT: 65 IN | SYSTOLIC BLOOD PRESSURE: 170 MMHG | BODY MASS INDEX: 19.99 KG/M2 | HEART RATE: 69 BPM | RESPIRATION RATE: 15 BRPM | TEMPERATURE: 97 F

## 2024-02-29 DIAGNOSIS — R19.4 CHANGE IN BOWEL HABITS: ICD-10-CM

## 2024-02-29 DIAGNOSIS — Z86.010 PERSONAL HISTORY OF COLONIC POLYPS: ICD-10-CM

## 2024-02-29 PROCEDURE — 45385 COLONOSCOPY W/LESION REMOVAL: CPT | Performed by: INTERNAL MEDICINE

## 2024-02-29 PROCEDURE — 99070 SPECIAL SUPPLIES PHYS/QHP: CPT | Performed by: INTERNAL MEDICINE

## 2024-02-29 PROCEDURE — 88305 TISSUE EXAM BY PATHOLOGIST: CPT | Performed by: INTERNAL MEDICINE

## 2024-02-29 DEVICE — CLIP HEMSTAS W11XL230CM 2.8MM WRK CHN W/ SMRT: Type: IMPLANTABLE DEVICE | Status: FUNCTIONAL

## 2024-02-29 RX ORDER — LIDOCAINE HYDROCHLORIDE 10 MG/ML
INJECTION, SOLUTION EPIDURAL; INFILTRATION; INTRACAUDAL; PERINEURAL AS NEEDED
Status: DISCONTINUED | OUTPATIENT
Start: 2024-02-29 | End: 2024-02-29 | Stop reason: SURG

## 2024-02-29 RX ORDER — NALOXONE HYDROCHLORIDE 0.4 MG/ML
0.08 INJECTION, SOLUTION INTRAMUSCULAR; INTRAVENOUS; SUBCUTANEOUS ONCE AS NEEDED
Status: DISCONTINUED | OUTPATIENT
Start: 2024-02-29 | End: 2024-02-29

## 2024-02-29 RX ORDER — SODIUM CHLORIDE, SODIUM LACTATE, POTASSIUM CHLORIDE, CALCIUM CHLORIDE 600; 310; 30; 20 MG/100ML; MG/100ML; MG/100ML; MG/100ML
INJECTION, SOLUTION INTRAVENOUS CONTINUOUS
Status: DISCONTINUED | OUTPATIENT
Start: 2024-02-29 | End: 2024-02-29

## 2024-02-29 RX ADMIN — LIDOCAINE HYDROCHLORIDE 50 MG: 10 INJECTION, SOLUTION EPIDURAL; INFILTRATION; INTRACAUDAL; PERINEURAL at 08:37:00

## 2024-02-29 RX ADMIN — SODIUM CHLORIDE, SODIUM LACTATE, POTASSIUM CHLORIDE, CALCIUM CHLORIDE: 600; 310; 30; 20 INJECTION, SOLUTION INTRAVENOUS at 08:37:00

## 2024-02-29 NOTE — H&P
History & Physical Examination    Patient Name: Quynh Krishnamurthy  MRN: [id number]  CSN: [id number]  YOB: 1945    Diagnosis: Personal history of adenomatous colon polyps        Medications Prior to Admission   Medication Sig Dispense Refill Last Dose    atorvastatin 10 MG Oral Tab        alendronate 70 MG Oral Tab Take 1 tablet (70 mg total) by mouth once a week.       cyanocobalamine 500 MCG Oral Tab Take 1 tablet (500 mcg total) by mouth 3 (three) times a week.       Aspirin (ASPIR-LOW OR) Take 81 mg by mouth 3 (three) times a week.       Midodrine HCl 5 MG Oral Tab Take 1 tablet (5 mg total) by mouth in the morning and 1 tablet (5 mg total) before bedtime.   2024 at 0600    ferrous sulfate 325 (65 FE) MG Oral Tab EC Take 1 tablet (325 mg total) by mouth daily with breakfast. 30 tablet 0     ergocalciferol 88624 units Oral Cap Take 1 capsule (50,000 Units total) by mouth every 30 (thirty) days.       Calcium Carbonate-Vitamin D 500-400 MG-UNIT Oral Tab Take 1 tablet by mouth daily.       [] PEG 3350-KCl-Na Bicarb-NaCl (TRILYTE) 420 g Oral Recon Soln Take 4,000 mL (4 L total) by mouth one time for 1 dose. 4000 mL 0     clotrimazole-betamethasone 1-0.05 % External Cream PRN       atorvastatin 20 MG Oral Tab Take 1 tablet (20 mg total) by mouth After dinner. (Patient not taking: Reported on 2024)        Current Facility-Administered Medications   Medication Dose Route Frequency    lactated ringers infusion   Intravenous Continuous       Allergies:   Allergies   Allergen Reactions    Morphine NAUSEA AND VOMITING    Tegaderm Ag Mesh 2\"X2\" [Wound Dressings] UNKNOWN     Skin Bubbles up       Past Medical History:   Diagnosis Date    Arrhythmia     Encounter for colonoscopy due to history of colonic polyp 2017    Esophageal reflux     Essential hypertension     High cholesterol      Past Surgical History:   Procedure Laterality Date    CATARACTS, OPHTHM (DMG)  2017    Left and right eyes     COLONOSCOPY N/A 7/21/2017    Procedure: COLONOSCOPY;  Surgeon: He Diego MD;  Location: [facility] ENDOSCOPY    ELBOW FRACTURE SURGERY Left 2019    OPEN RX FEMUR FX+INTRAMED MOHAMUD Left 03/01/2017    REMOVAL OF OVARIAN CYST(S)  12/2014     History reviewed. No pertinent family history.  Social History     Tobacco Use    Smoking status: Never    Smokeless tobacco: Never   Substance Use Topics    Alcohol use: No       SYSTEM Check if Review is Normal Check if Physical Exam is Normal If not normal, please explain:   HEENT [X ] [ X]    NECK  [X ] [ X]    HEART [X ] [ X]    LUNGS [X ] [ X]    ABDOMEN [X ] [ X]    EXTREMITIES [X ] [ X]    OTHER        [ x ] I have discussed the risks and benefits and alternatives with the patient/family.  They understand and agree to proceed with plan of care.  [ x ] I have reviewed the History and Physical done within the last 30 days.  Any changes noted above.    Yousuf Silva MD  2/29/2024  8:36 AM

## 2024-02-29 NOTE — OPERATIVE REPORT
[facility] Endoscopy Report      Date of Procedure:  02/29/24      Preoperative Diagnosis:  1.  Personal history of adenomatous colon polyps  2.  Change in bowel habits (episodic constipation)      Postoperative Diagnosis:  Colon polyps      Procedure:    Colonoscopy with polypectomy      Surgeon:  Yousuf Silva M.D.      Anesthesia:  Monitored anesthesia care  Cecal withdrawal time: 32 minutes  EBL:  Insignificant      Brief History:  This is a 78 year old female who presents for surveillance colonoscopy in the setting of a history of adenomatous colon polyps.  The patient's last colonoscopy is approaching 3 years prior with mobile #4 subcentimeter tubular adenomas.  The patient has noted episodic constipation with passage of smaller volume and more frequent stools suggestive of outlet delay.  She is otherwise asymptomatic from a lower gastrointestinal tract standpoint.      Technique:  After informed consent, the patient was placed in the left lateral recumbent position.  Digital rectal examination revealed no palpable intraluminal abnormalities.  An Olympus variable stiffness 190 series HD pediatric colonoscope was inserted into the rectum and advanced under direct vision by following the lumen to the terminal ileum.  The colon was examined upon withdrawal in the left lateral recumbent position.      Findings:  The preparation of the colon was very good.  The terminal ileum was examined for 5 cm and visually normal.  The ileocecal valve was well preserved. The visualized colonic mucosa from the cecum to the anal verge was normal with an intact vascular pattern.  There were #7 polyps seen within the colon which removed as follows:    1.  In the cecum there were #3 4-7 mm sessile polyps (#2 hyperplastic/serrated in appearance).  All were cold snare excised and retrieved.  #1 site was secured with #2 hemostatic clips.  2.  In the distal ascending colon there were #3 polyps measuring 3-7 mm  in size all having a hyperplastic/serrated appearance.  These were cold snare excised and retrieved.  3.  In the mid transverse colon there was a 4 mm serrated polyp which was cold snare excised and retrieved.    Inspection of all sites revealed no evidence of ongoing bleeding.  There were no other colonic polyps, definite diverticula, mass lesions, vascular anomalies or signs of inflammation seen.  Retroflexion in the right colon and rectum revealed no abnormalities.  The procedure was well tolerated without immediate complication.      Impression:  1.  Multiple diminutive/small colon polyps as described above  2.  Otherwise normal colonoscopy to the terminal ileum    Recommendations:  1.  Standard postprocedural instructions given.  2.  Follow-up biopsy results.        Yousuf Silva MD  2/29/2024

## 2024-02-29 NOTE — ANESTHESIA POSTPROCEDURE EVALUATION
Patient: Quynh Krishnamurthy    Procedure Summary       Date: 02/29/24 Room / Location: [facility] ENDOSCOPY 02 / [facility]    Anesthesia Start: 0835 Anesthesia Stop:     Procedure: COLONOSCOPY Diagnosis:       Change in bowel habits      Personal history of colonic polyps      (polyps)    Surgeons: Yousuf Silva MD Anesthesiologist: Mery Cragi MD    Anesthesia Type: general ASA Status: 3            Anesthesia Type: general    Vitals Value Taken Time   /63 02/29/24 0930   Temp 97.4 °F (36.3 °C) 02/29/24 0930   Pulse 65 02/29/24 0930   Resp 17 02/29/24 0930   SpO2 99 % 02/29/24 0930       [facility] AN Post Evaluation:   Patient Evaluated in PACU  Patient Participation: complete - patient participated  Level of Consciousness: awake  Pain Management: adequate  Airway Patency:patent  Dental exam unchanged from preop  Yes    Cardiovascular Status: acceptable  Respiratory Status: acceptable  Postoperative Hydration acceptable      MERY CRAIG MD  2/29/2024 9:30 AM

## 2024-02-29 NOTE — DISCHARGE INSTRUCTIONS
Home Care Instructions for Colonoscopy with Sedation    Diet:  - Resume your regular diet as tolerated unless otherwise instructed.  - Start with light meals to minimize bloating.  - Do not drink alcohol today.    Medication:  - If you have questions about resuming your normal medications, please contact your Primary Care Physician.    Activities:  - Take it easy today. Do not return to work today.  - Do not drive today.  - Do not operate any machinery today (including kitchen equipment).    Colonoscopy:  - You may notice some rectal \"spotting\" (a little blood on the toilet tissue) for a day or two after the exam. This is normal.  - If you experience any rectal bleeding (not spotting), persistent tenderness or sharp severe abdominal pains, oral temperature over 100 degrees Fahrenheit, light-headedness or dizziness, or any other problems, contact your doctor.      **If unable to reach your doctor, please go to the [facility] Emergency Room**    - Your referring physician will receive a full report of your examination.  - If you do not hear from your doctor's office within two weeks of your biopsy, please call them for your results.    You may be able to see your laboratory results in [employer] between 4 and 7 business days.  In some cases, your physician may not have viewed the results before they are released to [employer].  If you have questions regarding your results contact the physician who ordered the test/exam by phone or via [employer] by choosing \"Ask a Medical Question.\"

## 2024-02-29 NOTE — ANESTHESIA PREPROCEDURE EVALUATION
Anesthesia PreOp Note    HPI:     Quynh Krishnamurthy is a 78 year old female who presents for preoperative consultation requested by: Yousuf Silva MD    Date of Surgery: 2/29/2024    Procedure(s):  COLONOSCOPY  Indication: Change in bowel habits /Personal history of colonic polyps    Relevant Problems   No relevant active problems       NPO:  Last Liquid Consumption Date: 02/29/24  Last Liquid Consumption Time: 0600  Last Solid Consumption Date: 02/28/24  Last Solid Consumption Time: 1000  Last Liquid Consumption Date: 02/29/24          History Review:  Patient Active Problem List    Diagnosis Date Noted    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    Orthostatic hypotension 01/06/2021    Intracranial atherosclerosis 01/06/2021    Supracondylar fracture of left humerus with intercondylar extension 03/15/2019    Age-related osteoporosis with current pathological fracture of left humerus (HCC) 03/15/2019    Fall (on)(from) sidewalk curb, initial encounter 03/15/2019    Left elbow fracture, closed, initial encounter 03/15/2019    Encounter for colonoscopy due to history of colonic polyp 07/21/2017    Gastroesophageal reflux disease without esophagitis 07/21/2017    Closed left hip fracture (HCC) 03/01/2017    Closed left hip fracture, initial encounter ([facility]) 03/01/2017       Past Medical History:   Diagnosis Date    Arrhythmia     Encounter for colonoscopy due to history of colonic polyp 07/21/2017    Esophageal reflux     Essential hypertension     High cholesterol        Past Surgical History:   Procedure Laterality Date    CATARACTS, OPHTHM (DMG)  08/2017    Left and right eyes    COLONOSCOPY N/A 7/21/2017    Procedure: COLONOSCOPY;  Surgeon: eH Diego MD;  Location: [facility] ENDOSCOPY    ELBOW FRACTURE SURGERY Left 2019    OPEN RX FEMUR FX+INTRAMED MOHAMDU Left 03/01/2017    REMOVAL OF OVARIAN CYST(S)  12/2014       Medications Prior to Admission   Medication Sig Dispense Refill Last Dose    atorvastatin 10 MG Oral Tab         alendronate 70 MG Oral Tab Take 1 tablet (70 mg total) by mouth once a week.       cyanocobalamine 500 MCG Oral Tab Take 1 tablet (500 mcg total) by mouth 3 (three) times a week.       Aspirin (ASPIR-LOW OR) Take 81 mg by mouth 3 (three) times a week.       Midodrine HCl 5 MG Oral Tab Take 1 tablet (5 mg total) by mouth in the morning and 1 tablet (5 mg total) before bedtime.   2024 at 0600    ferrous sulfate 325 (65 FE) MG Oral Tab EC Take 1 tablet (325 mg total) by mouth daily with breakfast. 30 tablet 0     ergocalciferol 71484 units Oral Cap Take 1 capsule (50,000 Units total) by mouth every 30 (thirty) days.       Calcium Carbonate-Vitamin D 500-400 MG-UNIT Oral Tab Take 1 tablet by mouth daily.       [] PEG 3350-KCl-Na Bicarb-NaCl (TRILYTE) 420 g Oral Recon Soln Take 4,000 mL (4 L total) by mouth one time for 1 dose. 4000 mL 0     clotrimazole-betamethasone 1-0.05 % External Cream PRN       atorvastatin 20 MG Oral Tab Take 1 tablet (20 mg total) by mouth After dinner. (Patient not taking: Reported on 2024)        Current Facility-Administered Medications Ordered in Epic   Medication Dose Route Frequency Provider Last Rate Last Admin    lactated ringers infusion   Intravenous Continuous Yousuf Silva MD         No current [facility]-ordered outpatient medications on file.       Allergies   Allergen Reactions    Morphine NAUSEA AND VOMITING    Tegaderm Ag Mesh 2\"X2\" [Wound Dressings] UNKNOWN     Skin Bubbles up       History reviewed. No pertinent family history.  Social History     Socioeconomic History    Marital status:    Tobacco Use    Smoking status: Never    Smokeless tobacco: Never   Vaping Use    Vaping Use: Never used   Substance and Sexual Activity    Alcohol use: No    Drug use: No   Other Topics Concern    Caffeine Concern No    Exercise Yes     Comment: 5 days a week       Available pre-op labs reviewed.             Vital Signs:  Body mass index is 19.97 kg/m².    height is 1.651 m (5' 5\") and weight is 54.4 kg (120 lb). Her oral temperature is 97.9 °F (36.6 °C). Her blood pressure is 173/99 (abnormal) and her pulse is 77. Her respiration is 15 and oxygen saturation is 98%.   Vitals:    02/27/24 1257 02/29/24 0740   BP:  (!) 173/99   Pulse:  77   Resp:  15   Temp:  97.9 °F (36.6 °C)   TempSrc:  Oral   SpO2:  98%   Weight: 54.4 kg (120 lb)    Height: 1.651 m (5' 5\")         Anesthesia Evaluation     Patient summary reviewed and Nursing notes reviewed    Airway   Mallampati: III  TM distance: >3 FB  Neck ROM: limited  Dental      Comment: 5 upper, 4 lower    Pulmonary - negative ROS and normal exam   Cardiovascular     Rhythm: irregular  ROS comment: hypotension    Neuro/Psych      Comments: Basilar aneurysm    GI/Hepatic/Renal    (+) GERD    Endo/Other    (+) arthritis  Abdominal  - normal exam                 Anesthesia Plan:   ASA:  3  Plan:   General  Informed Consent Plan and Risks Discussed With:  Patient  Discussed plan with:  Surgeon      I have informed Quynh T Ni and/or legal guardian or family member of the nature of the anesthetic plan, benefits, risks including possible dental damage if relevant, major complications, and any alternative forms of anesthetic management.   All of the patient's questions were answered to the best of my ability. The patient desires the anesthetic management as planned.  GLADYS NAVA MD  2/29/2024 8:32 AM  Present on Admission:  **None**

## 2024-03-06 ENCOUNTER — TELEPHONE (OUTPATIENT)
Facility: CLINIC | Age: 79
End: 2024-03-06

## 2024-03-06 NOTE — TELEPHONE ENCOUNTER
----- Message from Yousuf Silva MD sent at 3/1/2024  5:19 PM CST -----  I spoke to Ty.  Quynh is feeling well.  She had #7 subcentimeter polyps removed.  #6 were adenomas (traditional and serrated) and the seventh polyp hyperplastic.  I discussed the significance.  Normally a surveillance colonoscopy would be performed in 3 years.  The decision to proceed should be based on patient preference, functional status and comorbidities.    GI RNs: Please enter in health maintenance that a colonoscopy was performed.  Please enter office visit recall for 3 years.

## 2024-03-06 NOTE — TELEPHONE ENCOUNTER
Health maintenance updated. 3 year office visit recall entered. Colonoscopy recall done on 2/28/24.

## 2024-03-13 ENCOUNTER — OFFICE VISIT (OUTPATIENT)
Dept: NEUROLOGY | Facility: CLINIC | Age: 79
End: 2024-03-13
Payer: MEDICARE

## 2024-03-13 VITALS — BODY MASS INDEX: 19.99 KG/M2 | HEIGHT: 65 IN | WEIGHT: 120 LBS

## 2024-03-13 DIAGNOSIS — G50.0 TRIGEMINAL NEURALGIA OF RIGHT SIDE OF FACE: ICD-10-CM

## 2024-03-13 DIAGNOSIS — R42 VERTIGO: ICD-10-CM

## 2024-03-13 DIAGNOSIS — R42 POSTURAL DIZZINESS WITH PRESYNCOPE: ICD-10-CM

## 2024-03-13 DIAGNOSIS — I67.1 CEREBRAL ANEURYSM (HCC): Primary | ICD-10-CM

## 2024-03-13 DIAGNOSIS — R55 POSTURAL DIZZINESS WITH PRESYNCOPE: ICD-10-CM

## 2024-03-13 DIAGNOSIS — I67.2 INTRACRANIAL ATHEROSCLEROSIS: ICD-10-CM

## 2024-03-13 PROCEDURE — 99212 OFFICE O/P EST SF 10 MIN: CPT | Performed by: OTHER

## 2024-03-13 NOTE — PROGRESS NOTES
[facility]  [address] [place] [address], [address]  [facility] [address]  [phone]          Established  Follow Up Visit       Date: March 13, 2024  Patient Name: Quynh Krishnamurthy   MRN: [id number]  [facility] [address]  [place] [address]    Interval History:   --Yearly follow up re: aneurysm.      --When she wakes up in the morning, she gets dizzy for a few seconds.  Associated with change in head positions.  Sometimes gets pressure in her forehead.  Had to stop using exercise equipment and Adam.  Had BPPV a few years ago.  Mother had vertigo.       OUTPATIENT MEDICATIONS  Current Outpatient Medications on File Prior to Visit   Medication Sig Dispense Refill    atorvastatin 10 MG Oral Tab       alendronate 70 MG Oral Tab Take 1 tablet (70 mg total) by mouth once a week.      cyanocobalamine 500 MCG Oral Tab Take 1 tablet (500 mcg total) by mouth 3 (three) times a week.      clotrimazole-betamethasone 1-0.05 % External Cream PRN      Aspirin (ASPIR-LOW OR) Take 81 mg by mouth 3 (three) times a week.      Midodrine HCl 5 MG Oral Tab Take 1 tablet (5 mg total) by mouth in the morning and 1 tablet (5 mg total) before bedtime.      ferrous sulfate 325 (65 FE) MG Oral Tab EC Take 1 tablet (325 mg total) by mouth daily with breakfast. 30 tablet 0    Calcium Carbonate-Vitamin D 500-400 MG-UNIT Oral Tab Take 1 tablet by mouth daily.       No current facility-administered medications on file prior to visit.         PHYSICAL EXAM:     Ht 65\"   Wt 120 lb (54.4 kg)   BMI 19.97 kg/m²   General appearance: Well appearing, and in no acute distress  Skin: skin color, texture normal.  No rashes or lesions.    Head: Normocephalic, atraumatic.    Neurological exam:    Mental Status:   Attention/Concentration: intact attention on bedside test   Fund of knowledge: intact  Speech: no dysarthria or aphasia     LABS/DATA:  Component      Latest Ref Rng 11/8/2023   WBC      4.0 - 11.0 x10(3) uL 3.8  (L)    RBC      3.80 - 5.30 x10(6)uL 4.22    Hemoglobin      12.0 - 16.0 g/dL 13.7    Hematocrit      35.0 - 48.0 % 40.8    MCV      80.0 - 100.0 fL 96.7    MCH      26.0 - 34.0 pg 32.5    MCHC      31.0 - 37.0 g/dL 33.6    RDW-SD      35.1 - 46.3 fL 44.2    RDW      11.0 - 15.0 % 12.4    Platelet Count      150.0 - 450.0 10(3)uL 153.0    Prelim Neutrophil Abs      1.50 - 7.70 x10 (3) uL 1.90    Neutrophils Absolute      1.50 - 7.70 x10(3) uL 1.90    Lymphocytes Absolute      1.00 - 4.00 x10(3) uL 1.55    Monocytes Absolute      0.10 - 1.00 x10(3) uL 0.27    Eosinophils Absolute      0.00 - 0.70 x10(3) uL 0.02    Basophils Absolute      0.00 - 0.20 x10(3) uL 0.03    Immature Granulocyte Absolute      0.00 - 1.00 x10(3) uL 0.01    Neutrophils %      % 50.3    Lymphocytes %      % 41.0    Monocytes %      % 7.1    Eosinophils %      % 0.5    Basophils %      % 0.8    Immature Granulocyte %      % 0.3    Glucose      70 - 99 mg/dL 102 (H)    Sodium      136 - 145 mmol/L 138    Potassium      3.5 - 5.1 mmol/L 4.2    Chloride      98 - 112 mmol/L 106    Carbon Dioxide, Total      21.0 - 32.0 mmol/L 28.0    ANION GAP      0 - 18 mmol/L 4    BUN      9 - 23 mg/dL 8 (L)    CREATININE      0.55 - 1.02 mg/dL 0.82    BUN/CREATININE RATIO      10.0 - 20.0  9.8 (L)    CALCIUM      8.7 - 10.4 mg/dL 9.5    CALCULATED OSMOLALITY      275 - 295 mOsm/kg 285    EGFR      >=60 mL/min/1.73m2 74    ALT (SGPT)      10 - 49 U/L 17    AST (SGOT)      <=34 U/L 31    ALKALINE PHOSPHATASE      55 - 142 U/L 52 (L)    Total Bilirubin      0.2 - 1.1 mg/dL 1.2 (H)    PROTEIN, TOTAL      5.7 - 8.2 g/dL 7.4    Albumin      3.2 - 4.8 g/dL 4.6    Globulin      2.8 - 4.4 g/dL 2.8    A/G Ratio      1.0 - 2.0  1.6    Patient Fasting for CMP? Yes    Cholesterol, Total      <200 mg/dL 162    HDL Cholesterol      40 - 59 mg/dL 84 (H)    Triglycerides      30 - 149 mg/dL 67    LDL Cholesterol Calc      <100 mg/dL 65    VLDL      0 - 30 mg/dL 10    NON-HDL  CHOLESTEROL      <130 mg/dL 78    Patient Fasting for Lipid? Yes    HEMOGLOBIN A1c      <5.7 % 5.9 (H)    ESTIMATED AVERAGE GLUCOSE      68 - 126 mg/dL 123       Legend:  (L) Low  (H) High      IMAGING:  N/A    ASSESSMENT:  The patient is a 78 year old  woman with a history of small basilar aneurysm under conservative management, intracranial atherosclerosis, orthostatic hypotension, HTN, HLD who presents for follow-up.    -MRI and MRA brain 8/24/2022: Right superior cerebellar artery abuts the cephalad aspect of the right trigeminal cisternal nerve, asymmetric compared to left, which can be seen in trigeminal neuralgia   -MRA brain: Stable basilar artery aneurysm 2 mm, right superior cerebellar artery contacts right trigeminal nerve    Dizziness vertigo history; has pre-syncope in the morning likely due to venous pooling at night   -Discussed staying hydrated, orthostatic precautions   -If vertigo recurs, refer to vestibular PT     Basilar artery aneurysm has remained stable and asymptomatic  -MRA brain in 4 years (2/2027), 7 years, 10 years w/ every 5 years as long as remains stable   -If growing/unstable, will refer back to [facility] urgently  -Stroke symptoms reviewed and list provided       Right sided trigeminal neuralgia in the distribution of her right trigeminal nerve.  Symptoms resolved.    -If symptoms recur, we can discuss trying gabapentin or baclofen PRN.       Intracranial atherosclerosis ?questionable right parietal infarct in 2018 without diffusion restriction so less likely true infarct.  Intracranial atherosclerosis not well visualized on subsequent imaging.  -Tolerating Aspirin 3 times per week well and continuing statin   -Yearly check of LDL and hemoglobin A1c per PCP preference   -Stroke symptoms reviewed and list provided      Follow up in 1 year     Discussed indication, administration, dose, and side effects with patient of any medications personally prescribed. Patient was  advised to let my office know if they have any questions or concerns.       Today, I personally spent 17 minutes in this case, including chart review, time spent with patient doing face to face evaluation w/ interview and exam and patient education, counseling, and time was spent in patient education, counseling, and coordination of care as described above.   Issues discussed: Diagnosis and implications on future health, benefits and side effects of present and future medications, test results as well as further testing and medications required.    This note was prepared using Dragon Medical voice recognition dictation software and as a result, errors may occur. When identified, these errors have been corrected. While every attempt is made to correct errors during dictation, discrepancies may still exist    KALPESH Pandya DO   Staff Physician, Neurology   3/13/2024  1:44 PM

## 2024-05-14 ENCOUNTER — TELEPHONE (OUTPATIENT)
Dept: NEUROLOGY | Facility: CLINIC | Age: 79
End: 2024-05-14

## 2024-05-14 DIAGNOSIS — I67.1 CEREBRAL ANEURYSM (HCC): Primary | ICD-10-CM

## 2024-05-14 NOTE — TELEPHONE ENCOUNTER
Pt  called in set up f/ up for wife mid July. Is looking if possible for pt to come in sooner, pt had a recent fall and dizzy. Pls advise.

## 2024-05-15 ENCOUNTER — APPOINTMENT (OUTPATIENT)
Dept: CT IMAGING | Age: 79
End: 2024-05-15
Attending: EMERGENCY MEDICINE

## 2024-05-15 ENCOUNTER — HOSPITAL ENCOUNTER (OUTPATIENT)
Dept: CT IMAGING | Facility: HOSPITAL | Age: 79
Discharge: HOME | End: 2024-05-15
Attending: Other

## 2024-05-15 ENCOUNTER — HOSPITAL ENCOUNTER (OUTPATIENT)
Age: 79
Discharge: HOME | End: 2024-05-15
Attending: EMERGENCY MEDICINE

## 2024-05-15 ENCOUNTER — APPOINTMENT (OUTPATIENT)
Dept: GENERAL RADIOLOGY | Age: 79
End: 2024-05-15
Attending: EMERGENCY MEDICINE

## 2024-05-15 VITALS
RESPIRATION RATE: 16 BRPM | OXYGEN SATURATION: 99 % | DIASTOLIC BLOOD PRESSURE: 67 MMHG | HEART RATE: 68 BPM | TEMPERATURE: 97 F | SYSTOLIC BLOOD PRESSURE: 167 MMHG

## 2024-05-15 DIAGNOSIS — S09.90XA INJURY OF HEAD, INITIAL ENCOUNTER: ICD-10-CM

## 2024-05-15 DIAGNOSIS — S22.32XA CLOSED FRACTURE OF ONE RIB OF LEFT SIDE, INITIAL ENCOUNTER: Primary | ICD-10-CM

## 2024-05-15 DIAGNOSIS — I67.1 CEREBRAL ANEURYSM (HCC): ICD-10-CM

## 2024-05-15 LAB
CREAT BLD-MCNC: 0.8 MG/DL
EGFRCR SERPLBLD CKD-EPI 2021: 76 ML/MIN/1.73M2 (ref 60–?)

## 2024-05-15 PROCEDURE — 90471 IMMUNIZATION ADMIN: CPT

## 2024-05-15 PROCEDURE — 71111 X-RAY EXAM RIBS/CHEST4/> VWS: CPT | Performed by: EMERGENCY MEDICINE

## 2024-05-15 PROCEDURE — 70498 CT ANGIOGRAPHY NECK: CPT | Performed by: OTHER

## 2024-05-15 PROCEDURE — 99214 OFFICE O/P EST MOD 30 MIN: CPT

## 2024-05-15 PROCEDURE — 70486 CT MAXILLOFACIAL W/O DYE: CPT | Performed by: EMERGENCY MEDICINE

## 2024-05-15 PROCEDURE — 70450 CT HEAD/BRAIN W/O DYE: CPT | Performed by: EMERGENCY MEDICINE

## 2024-05-15 PROCEDURE — 82565 ASSAY OF CREATININE: CPT

## 2024-05-15 PROCEDURE — 70496 CT ANGIOGRAPHY HEAD: CPT | Performed by: OTHER

## 2024-05-15 PROCEDURE — 99204 OFFICE O/P NEW MOD 45 MIN: CPT

## 2024-05-15 NOTE — ED PROVIDER NOTES
Patient Seen in: [facility] [place]      History     Chief Complaint   Patient presents with    Fall     Stated Complaint: Fall    Subjective:     9-year-old female presents today for evaluation after fall.  2 days ago patient was walking on indoor track and stumbled and fell and hit her face.  No loss conscious or vomiting.  Patient reports she was \"dizzy\" for approximately 1 minute after falling and hitting her face.  She did not fall because of dizziness.  No numbness, tingling, weakness.  Patient reports she hit her nose and her upper lip.  She had mild pain in her ribs yesterday but the pain in her ribs is increased today.  No abdominal pain.  No vomiting or diarrhea.  No blood thinners.  Symptoms are acute mild.  Patient reports history of cerebral aneurysm that is currently being monitored.  No headache.    Objective:   Past Medical History:    Arrhythmia    Encounter for colonoscopy due to history of colonic polyp    Esophageal reflux    Essential hypertension    High cholesterol              Past Surgical History:   Procedure Laterality Date    Cataracts, ophthm (dmg)  08/2017    Left and right eyes    Colonoscopy N/A 7/21/2017    Procedure: COLONOSCOPY;  Surgeon: He Diego MD;  Location: [facility] ENDOSCOPY    Colonoscopy N/A 2/29/2024    Procedure: COLONOSCOPY;  Surgeon: Yousuf Silva MD;  Location: [facility] ENDO    Elbow fracture surgery Left 2019    Open rx femur fx+intramed donta Left 03/01/2017    Removal of ovarian cyst(s)  12/2014                Social History     Socioeconomic History    Marital status:    Tobacco Use    Smoking status: Never    Smokeless tobacco: Never   Vaping Use    Vaping status: Never Used   Substance and Sexual Activity    Alcohol use: No    Drug use: No   Other Topics Concern    Caffeine Concern No    Exercise Yes     Comment: 5 days a week   Social History Narrative    The patient does not use an assistive device..      The patient does live in a home with  stairs.                Physical Exam     ED Triage Vitals [05/15/24 0853]   BP (!) 167/67   Pulse 68   Resp 16   Temp 97.4 °F (36.3 °C)   Temp src Temporal   SpO2 99 %   O2 Device None (Room air)       Current:BP (!) 167/67   Pulse 68   Temp 97.4 °F (36.3 °C) (Temporal)   Resp 16   SpO2 99%         Physical Exam  Vitals reviewed.   Constitutional:       Appearance: Normal appearance. She is not toxic-appearing.   HENT:      Head: Normocephalic and atraumatic.      Mouth/Throat:      Mouth: Mucous membranes are moist. Injury (Ecchymosis and mild edema to the upper lip.) present.      Dentition: Normal dentition. No dental tenderness.        Comments: No Septal hematomas  Eyes:      Extraocular Movements: Extraocular movements intact.      Pupils: Pupils are equal, round, and reactive to light.   Cardiovascular:      Rate and Rhythm: Normal rate and regular rhythm.   Pulmonary:      Effort: Pulmonary effort is normal.      Breath sounds: Normal breath sounds.   Musculoskeletal:      Cervical back: Neck supple. No rigidity or tenderness.   Skin:     General: Skin is warm and dry.   Neurological:      Mental Status: She is alert and oriented to person, place, and time.      Cranial Nerves: No cranial nerve deficit, dysarthria or facial asymmetry.      Sensory: No sensory deficit.      Motor: No weakness.   Psychiatric:         Mood and Affect: Mood normal.         ED Course   Labs Reviewed - No data to display  Imaging:  CT FACIAL BONES (CPT=70486)    Result Date: 5/15/2024  CONCLUSION:  1. No acute fracture or dislocation of the maxillofacial bones. 2. Chronic fracture deformity at the left orbital floor as well as anterior-lateral walls of the left maxillary sinus and left lateral orbital rim. 3. Findings that suggest acute on chronic right sphenoid sinusitis. 4. Lesser incidental findings as above.   elm-remote  Dictated by (CST): Tex Lagunas MD on 5/15/2024 at 10:02 AM     Finalized by (CST): Tex Lagunas  MD on 5/15/2024 at 10:08 AM          CT BRAIN OR HEAD (18806)    Result Date: 5/15/2024  CONCLUSION:  1. No acute intracranial process by noncontrast CT technique. 2. Nonspecific white matter changes involving both cerebral hemispheres that most likely reflect sequelae of chronic microangiopathy. 3. Intracranial atherosclerosis. 4. Findings that suggest acute on chronic right sphenoid sinusitis.   elm-remote  Dictated by (CST): Tex Lagunas MD on 5/15/2024 at 9:59 AM     Finalized by (CST): Tex Lagunas MD on 5/15/2024 at 10:02 AM          XR RIBS WITH CHEST, BILATERAL   (FWM=01999)    Result Date: 5/15/2024  CONCLUSION:   Nondisplaced fracture anterior lateral left 7th rib.  No acute cardiopulmonary disease.    Dictated by (CST): Jim Vu MD on 5/15/2024 at 9:48 AM     Finalized by (CST): Jim Vu MD on 5/15/2024 at 9:50 AM           ED Course as of 05/15/24 1021  ------------------------------------------------------------  Time: 05/15 1018  Comment: Imaging reviewed.  Patient with history of head injury in 2018 with facial fractures seen on CT according to patient.  Given sinus spirometer and follow-up.  Patient to take Tylenol and will use Salonpas lidocaine over-the-counter.            MDM        79 year old female with pain after fall.  Will check imaging.    Differential diagnosis (including but not limited to):  Rib fracture, contusion, intracranial bleed, skull fracture, facial fracture.    ED course:  Pulse Ox: 99% on room air which is normal      Comment: Please note this report has been produced using speech recognition software and may contain errors related to that system including errors in grammar, punctuation, and spelling, as well as words and phrases that may be inappropriate. If there are any questions or concerns please feel free to contact the dictating provider for clarification.                                     Medical Decision Making      Disposition and Plan      Clinical Impression:  1. Closed fracture of one rib of left side, initial encounter    2. Injury of head, initial encounter         Disposition:  Discharge  5/15/2024 10:19 am    Follow-up:  Karri Frey MD  [address] [place] [address] [phone]    Schedule an appointment as soon as possible for a visit             Medications Prescribed:  Current Discharge Medication List                                 Leonard Huerta MD  5/15/2024  9:25 AM

## 2024-05-15 NOTE — DISCHARGE INSTRUCTIONS
Thank you for visiting our immediate care for your health care needs.  Please follow up with Orthopedics as referred in the next 1 to 2 days   If you have any additional problems please return to the immediate care.   Please take Tylenol and or Motrin for pain.  Use Salonpas lidocaine for pain.  Please use incentive spirometer 10 times x 4 times a day

## 2024-05-15 NOTE — TELEPHONE ENCOUNTER
Phone call placed to central scheduling. Central scheduling will call pt to arrange her stat CT. RN will follow up on message.

## 2024-05-15 NOTE — ED INITIAL ASSESSMENT (HPI)
Patient presents to IC with c/o fall on 5/13.  Patient was at the gym walking on the track when she fell forward.  Patient reports rib and facial pain.  Denies LOC/use of thinners.  Patient did have dizziness for less than 1 minute after the fall.

## 2024-05-16 NOTE — TELEPHONE ENCOUNTER
Phone call received from pt . Pt  would like to know the results on the pt scans and would like to know how to follow up.

## 2024-05-20 NOTE — TELEPHONE ENCOUNTER
Dr. Pandya, would you like the patient to keep her appointment or cancel?  She was seen in the ER for her recent fall and dizzy spell.  Reviewed and electronically signed by: DIAMOND Reyna

## 2024-06-28 ENCOUNTER — LAB ENCOUNTER (OUTPATIENT)
Dept: LAB | Facility: HOSPITAL | Age: 79
End: 2024-06-28
Attending: INTERNAL MEDICINE
Payer: MEDICARE

## 2024-06-28 DIAGNOSIS — E53.8 VITAMIN B12 DEFICIENCY: ICD-10-CM

## 2024-06-28 DIAGNOSIS — D50.8 IRON DEFICIENCY ANEMIA SECONDARY TO INADEQUATE DIETARY IRON INTAKE: ICD-10-CM

## 2024-06-28 DIAGNOSIS — E55.9 VITAMIN D DEFICIENCY: ICD-10-CM

## 2024-06-28 DIAGNOSIS — E11.9 DIABETES MELLITUS (HCC): Primary | ICD-10-CM

## 2024-06-28 DIAGNOSIS — E78.2 MIXED HYPERLIPIDEMIA: ICD-10-CM

## 2024-06-28 LAB
ALBUMIN SERPL-MCNC: 4.6 G/DL (ref 3.2–4.8)
ALBUMIN/GLOB SERPL: 1.8 {RATIO} (ref 1–2)
ALP LIVER SERPL-CCNC: 75 U/L
ALT SERPL-CCNC: 13 U/L
ANION GAP SERPL CALC-SCNC: 6 MMOL/L (ref 0–18)
AST SERPL-CCNC: 23 U/L (ref ?–34)
BASOPHILS # BLD AUTO: 0.03 X10(3) UL (ref 0–0.2)
BASOPHILS NFR BLD AUTO: 0.7 %
BILIRUB SERPL-MCNC: 0.8 MG/DL (ref 0.2–1.1)
BUN BLD-MCNC: 12 MG/DL (ref 9–23)
BUN/CREAT SERPL: 15.2 (ref 10–20)
CALCIUM BLD-MCNC: 9.1 MG/DL (ref 8.7–10.4)
CHLORIDE SERPL-SCNC: 105 MMOL/L (ref 98–112)
CHOLEST SERPL-MCNC: 162 MG/DL (ref ?–200)
CO2 SERPL-SCNC: 28 MMOL/L (ref 21–32)
CREAT BLD-MCNC: 0.79 MG/DL
DEPRECATED RDW RBC AUTO: 44.3 FL (ref 35.1–46.3)
EGFRCR SERPLBLD CKD-EPI 2021: 77 ML/MIN/1.73M2 (ref 60–?)
EOSINOPHIL # BLD AUTO: 0.04 X10(3) UL (ref 0–0.7)
EOSINOPHIL NFR BLD AUTO: 0.9 %
ERYTHROCYTE [DISTWIDTH] IN BLOOD BY AUTOMATED COUNT: 12.2 % (ref 11–15)
EST. AVERAGE GLUCOSE BLD GHB EST-MCNC: 120 MG/DL (ref 68–126)
FASTING PATIENT LIPID ANSWER: YES
FASTING STATUS PATIENT QL REPORTED: YES
GLOBULIN PLAS-MCNC: 2.6 G/DL (ref 2–3.5)
GLUCOSE BLD-MCNC: 99 MG/DL (ref 70–99)
HBA1C MFR BLD: 5.8 % (ref ?–5.7)
HCT VFR BLD AUTO: 38.6 %
HDLC SERPL-MCNC: 79 MG/DL (ref 40–59)
HGB BLD-MCNC: 13.1 G/DL
IMM GRANULOCYTES # BLD AUTO: 0.01 X10(3) UL (ref 0–1)
IMM GRANULOCYTES NFR BLD: 0.2 %
IRON SATN MFR SERPL: 20 %
IRON SERPL-MCNC: 67 UG/DL
LDLC SERPL CALC-MCNC: 69 MG/DL (ref ?–100)
LYMPHOCYTES # BLD AUTO: 1.57 X10(3) UL (ref 1–4)
LYMPHOCYTES NFR BLD AUTO: 34.8 %
MCH RBC QN AUTO: 33.2 PG (ref 26–34)
MCHC RBC AUTO-ENTMCNC: 33.9 G/DL (ref 31–37)
MCV RBC AUTO: 98 FL
MONOCYTES # BLD AUTO: 0.28 X10(3) UL (ref 0.1–1)
MONOCYTES NFR BLD AUTO: 6.2 %
NEUTROPHILS # BLD AUTO: 2.58 X10 (3) UL (ref 1.5–7.7)
NEUTROPHILS # BLD AUTO: 2.58 X10(3) UL (ref 1.5–7.7)
NEUTROPHILS NFR BLD AUTO: 57.2 %
NONHDLC SERPL-MCNC: 83 MG/DL (ref ?–130)
OSMOLALITY SERPL CALC.SUM OF ELEC: 288 MOSM/KG (ref 275–295)
PLATELET # BLD AUTO: 155 10(3)UL (ref 150–450)
POTASSIUM SERPL-SCNC: 4.2 MMOL/L (ref 3.5–5.1)
PROT SERPL-MCNC: 7.2 G/DL (ref 5.7–8.2)
RBC # BLD AUTO: 3.94 X10(6)UL
SODIUM SERPL-SCNC: 139 MMOL/L (ref 136–145)
TIBC SERPL-MCNC: 338 UG/DL (ref 250–425)
TRANSFERRIN SERPL-MCNC: 227 MG/DL (ref 250–380)
TRIGL SERPL-MCNC: 71 MG/DL (ref 30–149)
VIT B12 SERPL-MCNC: 829 PG/ML (ref 211–911)
VIT D+METAB SERPL-MCNC: 54.4 NG/ML (ref 30–100)
VLDLC SERPL CALC-MCNC: 11 MG/DL (ref 0–30)
WBC # BLD AUTO: 4.5 X10(3) UL (ref 4–11)

## 2024-06-28 PROCEDURE — 83540 ASSAY OF IRON: CPT

## 2024-06-28 PROCEDURE — 36415 COLL VENOUS BLD VENIPUNCTURE: CPT

## 2024-06-28 PROCEDURE — 82306 VITAMIN D 25 HYDROXY: CPT

## 2024-06-28 PROCEDURE — 80053 COMPREHEN METABOLIC PANEL: CPT

## 2024-06-28 PROCEDURE — 83036 HEMOGLOBIN GLYCOSYLATED A1C: CPT

## 2024-06-28 PROCEDURE — 80061 LIPID PANEL: CPT

## 2024-06-28 PROCEDURE — 82607 VITAMIN B-12: CPT

## 2024-06-28 PROCEDURE — 85025 COMPLETE CBC W/AUTO DIFF WBC: CPT

## 2024-06-28 PROCEDURE — 84466 ASSAY OF TRANSFERRIN: CPT

## 2024-08-27 ENCOUNTER — TELEPHONE (OUTPATIENT)
Dept: OTHER | Age: 79
End: 2024-08-27

## 2024-09-10 NOTE — ANESTHESIA PROCEDURE NOTES
- Patient state she went to emergency department on September 7, 2024 complaining of left leg weakness and bilateral upper extremity tingling, numbness worse to left than right.  Patient was discharged gabapentin 100 mg p.o. 3 times daily, ibuprofen 600 mg p.o. every 6 hours as needed for pain, Robaxin 1000 mg 3 times daily.  Patient state she did not  Robaxin because it was not given to her.  - Patient state she continued to have right-sided neck pain from the fall that she sustained on May 11th 2024.  - CT cervical spine was completed as well CT head without contrast during emergency department visit.  - CT head no acute intracranial abnormality identified.  - CT cervical spine:  Lung apices: The visualized lung apices appear unremarkable.Spine:Spinal canal: The spinal canal appears unremarkable.Spinal cord: The spinal cord appears unremarkable.Mineralization: Within normal limits.Rotation: No significant rotation is seen.Scoliosis: No significant scoliosis is seen.Vertebral Fusion: No vertebral fusion is identified.Listhesis: No significant listhesis is identified.Lordosis: The cervical lordosis is maintained.Intervertebral disc spaces: The intervertebral discs are preserved throughout.Osteophytes: Mild multilevel endplate osteophytes are seen.Endplate Sclerosis: No significant endplate sclerosis is seen.Uncovertebral degenerative changes: Mild multilevel uncovertebral joint arthrosis is seen.Facet degenerative changes: Mild multilevel facet degenerative changes are seen.Calcifications: None.Fractures: No acute cervical spine fracture dislocation or subluxation is seen.Miscellaneous:Mastoid air cells: The visualized mastoid air cells appear clear.Soft Tissues: Unremarkable.     Impression:     1. No acute cervical spine fracture dislocation or subluxation is seen. 2. Degenerative changes and other details as above.        Electronically signed by:Gil Mcgowan  Date:                                         ANESTHESIA INTUBATION     09/07/2024  Time:                                           09:40    - Patient state what is bothering her is numbness and tingling is worse to left arm and also now she is having difficulty walking because she is unable to lift her left leg at the knee.  Patient demonstrating a spastic walk.    - Patient state pain to left lower back.  Lumbar x-ray ordered:  There are 5 non rib-bearing lumbar type vertebra.  There is mild lumbar levoscoliosis with apex at L1-2.  Posterior lumbar alignment is maintained.  Vertebral heights are maintained.  No acute fracture seen.  There is mild multilevel vertebral body spurring and disc space narrowing.  There is mild moderate facet arthrosis in the lower lumbar spine.     Impression:     No acute osseous abnormality or static listhesis.  Mild lumbar spondylosis.        Electronically signed by:Jona Shelton MD  Date:                                            09/10/2024  Time:                                           16:52    - Unable to explain to patient why she is having difficulty ambulating.  - Patient is leaning on her fiance to ambulate.  - Denies left lower extremity numbness or tingling or weakness.  - Discussed referral to Neurology to complete EMG testing as well evaluation.  Referral to Dr. Keron Cavazos initiated.  - Discussed with patient possibly is a psychiatric issue and she need to inform her psychiatric PCP regarding the symptom she is having.  - Continue medications as prescribed.  - Discussed ED precaution.  Questions solicited and answered, patient verbalized and agreed to plan.

## 2024-10-25 PROBLEM — Z90.722 H/O BILATERAL SALPINGO-OOPHORECTOMY: Status: ACTIVE | Noted: 2024-10-25

## 2024-10-25 PROBLEM — Z78.0 MENOPAUSE: Status: ACTIVE | Noted: 2024-10-25

## 2024-10-25 PROBLEM — Z90.79 H/O BILATERAL SALPINGO-OOPHORECTOMY: Status: ACTIVE | Noted: 2024-10-25

## 2024-10-25 PROBLEM — R92.343 EXTREMELY DENSE TISSUE OF BOTH BREASTS ON MAMMOGRAPHY: Status: ACTIVE | Noted: 2024-10-25

## 2024-10-29 ENCOUNTER — HOSPITAL ENCOUNTER (OUTPATIENT)
Dept: CT IMAGING | Age: 79
Discharge: HOME | End: 2024-10-29
Attending: OBSTETRICS & GYNECOLOGY

## 2024-10-29 DIAGNOSIS — N63.24 MASS OF LOWER INNER QUADRANT OF LEFT BREAST: ICD-10-CM

## 2024-10-29 PROCEDURE — G0279 TOMOSYNTHESIS, MAMMO: HCPCS

## 2024-10-29 PROCEDURE — 76642 ULTRASOUND BREAST LIMITED: CPT

## 2025-01-03 ENCOUNTER — LAB ENCOUNTER (OUTPATIENT)
Dept: LAB | Facility: HOSPITAL | Age: 80
End: 2025-01-03
Attending: INTERNAL MEDICINE
Payer: MEDICARE

## 2025-01-03 DIAGNOSIS — R73.9 BLOOD GLUCOSE ELEVATED: ICD-10-CM

## 2025-01-03 DIAGNOSIS — E78.2 MIXED HYPERLIPIDEMIA: Primary | ICD-10-CM

## 2025-01-03 LAB
ALBUMIN SERPL-MCNC: 4.8 G/DL (ref 3.2–4.8)
ALBUMIN/GLOB SERPL: 1.9 {RATIO} (ref 1–2)
ALP LIVER SERPL-CCNC: 60 U/L
ALT SERPL-CCNC: 13 U/L
ANION GAP SERPL CALC-SCNC: 7 MMOL/L (ref 0–18)
AST SERPL-CCNC: 23 U/L (ref ?–34)
BILIRUB SERPL-MCNC: 0.9 MG/DL (ref 0.2–1.1)
BUN BLD-MCNC: 13 MG/DL (ref 9–23)
BUN/CREAT SERPL: 15.5 (ref 10–20)
CALCIUM BLD-MCNC: 9.7 MG/DL (ref 8.7–10.4)
CHLORIDE SERPL-SCNC: 106 MMOL/L (ref 98–112)
CHOLEST SERPL-MCNC: 177 MG/DL (ref ?–200)
CO2 SERPL-SCNC: 28 MMOL/L (ref 21–32)
CREAT BLD-MCNC: 0.84 MG/DL
EGFRCR SERPLBLD CKD-EPI 2021: 72 ML/MIN/1.73M2 (ref 60–?)
EST. AVERAGE GLUCOSE BLD GHB EST-MCNC: 123 MG/DL (ref 68–126)
FASTING PATIENT LIPID ANSWER: YES
FASTING STATUS PATIENT QL REPORTED: YES
GLOBULIN PLAS-MCNC: 2.5 G/DL (ref 2–3.5)
GLUCOSE BLD-MCNC: 98 MG/DL (ref 70–99)
HBA1C MFR BLD: 5.9 % (ref ?–5.7)
HDLC SERPL-MCNC: 78 MG/DL (ref 40–59)
LDLC SERPL CALC-MCNC: 84 MG/DL (ref ?–100)
NONHDLC SERPL-MCNC: 99 MG/DL (ref ?–130)
OSMOLALITY SERPL CALC.SUM OF ELEC: 292 MOSM/KG (ref 275–295)
POTASSIUM SERPL-SCNC: 3.7 MMOL/L (ref 3.5–5.1)
PROT SERPL-MCNC: 7.3 G/DL (ref 5.7–8.2)
SODIUM SERPL-SCNC: 141 MMOL/L (ref 136–145)
TRIGL SERPL-MCNC: 80 MG/DL (ref 30–149)
VLDLC SERPL CALC-MCNC: 13 MG/DL (ref 0–30)

## 2025-01-03 PROCEDURE — 80053 COMPREHEN METABOLIC PANEL: CPT

## 2025-01-03 PROCEDURE — 83036 HEMOGLOBIN GLYCOSYLATED A1C: CPT

## 2025-01-03 PROCEDURE — 36415 COLL VENOUS BLD VENIPUNCTURE: CPT

## 2025-01-03 PROCEDURE — 80061 LIPID PANEL: CPT

## 2025-04-02 ENCOUNTER — OFFICE VISIT (OUTPATIENT)
Dept: NEUROLOGY | Facility: CLINIC | Age: 80
End: 2025-04-02
Payer: MEDICARE

## 2025-04-02 VITALS
WEIGHT: 120 LBS | BODY MASS INDEX: 19.99 KG/M2 | DIASTOLIC BLOOD PRESSURE: 79 MMHG | HEART RATE: 71 BPM | SYSTOLIC BLOOD PRESSURE: 174 MMHG | RESPIRATION RATE: 73 BRPM | HEIGHT: 65 IN

## 2025-04-02 DIAGNOSIS — H81.10 BENIGN PAROXYSMAL POSITIONAL VERTIGO, UNSPECIFIED LATERALITY: ICD-10-CM

## 2025-04-02 DIAGNOSIS — I72.5 BASILAR ARTERY ANEURYSM: ICD-10-CM

## 2025-04-02 DIAGNOSIS — I95.1 ORTHOSTATIC HYPOTENSION: Primary | ICD-10-CM

## 2025-04-02 DIAGNOSIS — I67.2 INTRACRANIAL ATHEROSCLEROSIS: ICD-10-CM

## 2025-04-02 PROBLEM — S02.85XD CLOSED FRACTURE OF ORBIT WITH ROUTINE HEALING: Status: ACTIVE | Noted: 2018-04-12

## 2025-04-02 PROBLEM — S02.40DD CLOSED FRACTURE OF LEFT SIDE OF MAXILLA WITH ROUTINE HEALING: Status: ACTIVE | Noted: 2018-04-12

## 2025-04-02 PROBLEM — I49.3 PVCS (PREMATURE VENTRICULAR CONTRACTIONS): Status: ACTIVE | Noted: 2021-10-20

## 2025-04-02 PROBLEM — E78.00 PURE HYPERCHOLESTEROLEMIA: Status: ACTIVE | Noted: 2019-03-13

## 2025-04-02 PROBLEM — R55 SYNCOPE AND COLLAPSE: Status: ACTIVE | Noted: 2018-04-12

## 2025-04-02 PROBLEM — R92.343 EXTREMELY DENSE TISSUE OF BOTH BREASTS ON MAMMOGRAPHY: Status: ACTIVE | Noted: 2024-10-25

## 2025-04-02 PROBLEM — I60.9 SAH (SUBARACHNOID HEMORRHAGE) (HCC): Status: ACTIVE | Noted: 2018-04-12

## 2025-04-02 PROBLEM — Z96.629: Status: ACTIVE | Noted: 2021-10-20

## 2025-04-02 PROBLEM — Z95.810 HISTORY OF IMPLANTABLE CARDIAC DEFIBRILLATOR (ICD): Status: ACTIVE | Noted: 2018-11-07

## 2025-04-02 PROBLEM — R00.2 PALPITATIONS: Status: ACTIVE | Noted: 2019-04-15

## 2025-04-02 PROBLEM — I70.0 ATHEROSCLEROSIS OF AORTA: Status: ACTIVE | Noted: 2019-04-15

## 2025-04-02 PROBLEM — L91.0 KELOID: Status: ACTIVE | Noted: 2025-04-02

## 2025-04-02 PROCEDURE — 99214 OFFICE O/P EST MOD 30 MIN: CPT | Performed by: OTHER

## 2025-04-02 RX ORDER — SOY PROTEIN
POWDER (GRAM) ORAL
COMMUNITY

## 2025-04-02 RX ORDER — ERGOCALCIFEROL 1.25 MG/1
CAPSULE, LIQUID FILLED ORAL
COMMUNITY
Start: 2023-01-01

## 2025-04-02 RX ORDER — ELECTROLYTES/DEXTROSE
SOLUTION, ORAL ORAL
COMMUNITY

## 2025-04-02 NOTE — PATIENT INSTRUCTIONS
Midodrine is meant to help prevent low blood pressure which is usually manifested as lightheadedness like you are going to pass out or excessive fatigue (low energy).     Your blood pressure is usually low in the morning because your blood pressure drops overnight.  It is ok to use Midodrine in the morning as you are already doing.    During the day:   Recommend checking your blood pressure around 12 PM and 4 PM.      I would recommend using Midodrine if your systolic blood pressure (top number) is below 120 AND you feel lightheaded or fatigued.  This lightheaded or fatigue can occur if you move from laying down or sitting to standing.  If this occurs, then Midodrine should be taken.      If you do not feel lightheaded or fatigued, it is OK to skip Midodrine.      Midodrine can be taken up to 1-3 times daily.      Let me know please if the vertigo (spinning) keeps occurring when you move your head.     Ringing in the ears (tinnitus) can be related to age-related hearing loss.  It may be helpful to see an audiologist for a hearing test.

## 2025-04-02 NOTE — PROGRESS NOTES
[facility] [address], [address]  [facility] [address]  [phone]          Established  Follow Up Visit       Date: April 2, 2025  Patient Name: Quynh Krishnamurthy   MRN: [id number]  Primary physician: [address] [place] [address] [address]  [place] [address]    Interval History:   -- The patient has noticed in the morning her blood pressure is low and responds well to midodrine.  However by the end of the day her systolic blood pressure can be up to the 150s and here in the office it is 174.  She has reduced her midodrine use up to twice daily and is wondering if she can eliminate the evening dose.  She also feels a lot of anxiety whenever she checks her blood pressure prior to her next midodrine dose and notices this makes her more hypertensive as well.    -In the past couple of weeks she has had periodic benign paroxysmal positional vertigo whenever she turns her head.  When it occurs she feels gait instability.  The symptoms have been improving.  This is associated with mild nausea and a frontal headache.  Last week she was congested and felt like she might have had a virus.    OUTPATIENT MEDICATIONS  Medications Ordered Prior to Encounter[1]      PHYSICAL EXAM:     BP (!) 174/79 (BP Location: Left arm, Patient Position: Sitting, Cuff Size: adult)   Pulse 71   Resp (!) 73   Ht 65\"   Wt 120 lb (54.4 kg)   BMI 19.97 kg/m²   General appearance: Well appearing, and in no acute distress  Skin: skin color, texture normal.  No rashes or lesions.    Head: Normocephalic, atraumatic.    Neurological exam:    The patient is awake and alert, no evidence of aphasia.    Her gait is stable without any evidence of abnormality.  She demonstrates full strength in all 4 extremities.  Visual fields are full, extraocular movements are intact, there is no dysarthria, no facial droop, hearing is intact.  Finger-to-nose is intact bilaterally      LABS/DATA:  LDL is 84  CMP is normal  Hemoglobin A1c is  5.9    IMAGING:  CTA head and neck 5/15/2024  CONCLUSION:   1. 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   2. Mild beaded narrowing of P2 segment left posterior cerebral artery unchanged and likely related to focal fibromuscular dysplasia.   3. Mild non hemodynamically significant narrowing of M1 segment left MCA.   4. Moderate changes of chronic small vessel disease in cerebral white matter.   5. Patent carotid and vertebral arteries with no hemodynamically significant stenosis.         ASSESSMENT:  The patient is a 79 year old  woman with a history of small basilar aneurysm under conservative management, traumatic frontal subarachnoid hemorrhage, right sided trigeminal neuralgia, intracranial atherosclerosis, orthostatic hypotension, HTN, HLD, osteoporosis who presents for follow-up.    -MRI and MRA brain 8/24/2022: Right superior cerebellar artery abuts the cephalad aspect of the right trigeminal cisternal nerve, asymmetric compared to left, which can be seen in trigeminal neuralgia   -MRA brain: Stable basilar artery aneurysm 2 mm, right superior cerebellar artery contacts right trigeminal nerve    Benign paroxysmal positional vertigo likely flared up from virus and allergies this is slowly improving  - We discussed vestibular therapy, she will let me know if she is interested but as she is improving we will hold off    Orthostatic hypotension with hypertension in the evening  - We discussed how to use midodrine safely and how to avoid syncope and hypertension.     Basilar artery aneurysm has remained stable and asymptomatic  - MRA brain in 4 years (2/2027), 7 years, 10 years w/ every 5 years as long as remains stable   - If growing/unstable, will refer back to [facility] urgently  -Stroke symptoms reviewed and list provided       Right sided trigeminal neuralgia Symptoms resolved.    -If symptoms recur, we can discuss trying gabapentin or baclofen PRN.       Intracranial atherosclerosis  ?questionable right parietal infarct in 2018 without diffusion restriction so less likely true infarct.  Intracranial atherosclerosis not well visualized on subsequent imaging.  - Is taking aspirin daily now  - Tolerating atorvastatin 10 mg daily  - Yearly check of LDL and hemoglobin A1c per PCP preference     Follow up in 1 month    Discussed indication, administration, dose, and side effects with patient of any medications personally prescribed. Patient was advised to let my office know if they have any questions or concerns.     Today, I personally spent 40 minutes in this case, including chart review, time spent with patient doing face to face evaluation w/ interview and exam and patient education, counseling, and time was spent in patient education, counseling, and coordination of care as described above.   Issues discussed: Diagnosis and implications on future health, benefits and side effects of present and future medications, test results as well as further testing and medications required.    This note was prepared using Dragon Medical voice recognition dictation software and as a result, errors may occur. When identified, these errors have been corrected. While every attempt is made to correct errors during dictation, discrepancies may still exist    KALPESH Pandya DO   Staff Physician, Neurology   4/2/2025  10:39 AM               [1]   Current Outpatient Medications on File Prior to Visit   Medication Sig Dispense Refill    ergocalciferol 1.25 MG (14317 UT) Oral Cap       Cobalamin Combinations (VITAMIN B12-FOLIC ACID) 500-400 MCG Oral Tab 1 tablet Orally 3 times a week      Multiple Vitamin (MULTIVITAMIN ADULT) Oral Tab Take by mouth.      atorvastatin 10 MG Oral Tab       alendronate 70 MG Oral Tab Take 1 tablet (70 mg total) by mouth once a week.      cyanocobalamine 500 MCG Oral Tab Take 1 tablet (500 mcg total) by mouth 3 (three) times a week.      clotrimazole-betamethasone 1-0.05 % External Cream PRN       Aspirin (ASPIR-LOW OR) Take 81 mg by mouth 3 (three) times a week.      Midodrine HCl 5 MG Oral Tab Take 1 tablet (5 mg total) by mouth in the morning and 1 tablet (5 mg total) before bedtime.      ferrous sulfate 325 (65 FE) MG Oral Tab EC Take 1 tablet (325 mg total) by mouth daily with breakfast. 30 tablet 0    Calcium Carbonate-Vitamin D 500-400 MG-UNIT Oral Tab Take 1 tablet by mouth daily.       No current facility-administered medications on file prior to visit.

## 2025-05-15 ENCOUNTER — OFFICE VISIT (OUTPATIENT)
Dept: NEUROLOGY | Facility: CLINIC | Age: 80
End: 2025-05-15
Payer: MEDICARE

## 2025-05-15 VITALS
BODY MASS INDEX: 19.99 KG/M2 | HEIGHT: 65 IN | SYSTOLIC BLOOD PRESSURE: 140 MMHG | WEIGHT: 120 LBS | DIASTOLIC BLOOD PRESSURE: 70 MMHG

## 2025-05-15 DIAGNOSIS — I72.5 BASILAR ARTERY ANEURYSM: ICD-10-CM

## 2025-05-15 DIAGNOSIS — I95.1 ORTHOSTATIC HYPOTENSION: Primary | ICD-10-CM

## 2025-05-15 PROCEDURE — 99212 OFFICE O/P EST SF 10 MIN: CPT | Performed by: OTHER

## 2025-05-15 NOTE — PROGRESS NOTES
[facility] [address], [address]  [facility] [address]  [phone]          Established  Follow Up Visit       Date: May 15, 2025  Patient Name: Quynh Krishnamurthy   MRN: [id number]  [facility] [address]  [place] [address]    Interval History:   --Doing well.  Less anxious about orthostatic hypotension.  Still checking BP and using Midodrine.  Feeling well.  No recurrence of vertigo, trigeminal neuralgia.  Exercises consistently.   will have surgery this summer and she is going to care for him.        OUTPATIENT MEDICATIONS  Medications Ordered Prior to Encounter[1]      PHYSICAL EXAM:   /70   Ht 65\"   Wt 120 lb (54.4 kg)   BMI 19.97 kg/m²   General appearance: Well appearing, and in no acute distress  Skin: skin color, texture normal.  No rashes or lesions.    Head: Normocephalic, atraumatic.    Neurological exam:    Mental Status:   Attention/Concentration: intact attention on bedside test   Fund of knowledge: intact  Speech: no dysarthria or aphasia       LABS/DATA:  No new labs      IMAGING:  No new pertinent neuroimaging    ASSESSMENT:  The patient is a 80 year old woman with a history of small basilar aneurysm under conservative management, traumatic frontal subarachnoid hemorrhage, right sided trigeminal neuralgia, intracranial atherosclerosis, orthostatic hypotension, HTN, HLD, osteoporosis who presents for follow-up.    -MRI and MRA brain 8/24/2022: Right superior cerebellar artery abuts the cephalad aspect of the right trigeminal cisternal nerve, asymmetric compared to left, which can be seen in trigeminal neuralgia   -MRA brain: Stable basilar artery aneurysm 2 mm, right superior cerebellar artery contacts right trigeminal nerve     Benign paroxysmal positional vertigo likely flared up from virus and allergies this is slowly improving  - We discussed vestibular therapy, she will let me know if she is interested but as she is improving  we will hold off     Orthostatic hypotension with hypertension in the evening  - We discussed how to use midodrine safely and how to avoid syncope and hypertension.     Basilar artery aneurysm has remained stable and asymptomatic  - MRA brain in 4 years (2/2027), 7 years, 10 years w/ every 5 years as long as remains stable   - If growing/unstable, will refer back to [facility] urgently  -Stroke symptoms reviewed and list provided       Right sided trigeminal neuralgia Symptoms resolved.    -If symptoms recur, we can discuss trying gabapentin or baclofen PRN.       Intracranial atherosclerosis ?questionable right parietal infarct in 2018 without diffusion restriction so less likely true infarct.  Intracranial atherosclerosis not well visualized on subsequent imaging.  - Is taking aspirin daily now  - Tolerating atorvastatin 10 mg daily  - Yearly check of LDL and hemoglobin A1c per PCP preference     Follow up: 4-6 months     Discussed indication, administration, dose, and side effects with patient of any medications personally prescribed. Patient was advised to let my office know if they have any questions or concerns.       Today, I personally spent 15 minutes in this case, including chart review, time spent with patient doing face to face evaluation w/ interview and exam and patient education, counseling, and time was spent in patient education, counseling, and coordination of care as described above.   Issues discussed: Diagnosis and implications on future health, benefits and side effects of present and future medications, test results as well as further testing and medications required.    This note was prepared using Dragon Medical voice recognition dictation software and as a result, errors may occur. When identified, these errors have been corrected. While every attempt is made to correct errors during dictation, discrepancies may still exist    KALPESH Pandya DO   Staff Physician, Neurology    5/15/2025  9:27 AM               [1]   Current Outpatient Medications on File Prior to Visit   Medication Sig Dispense Refill    Cobalamin Combinations (VITAMIN B12-FOLIC ACID) 500-400 MCG Oral Tab 1 tablet Orally 3 times a week      ergocalciferol 1.25 MG (64958 UT) Oral Cap       Multiple Vitamin (MULTIVITAMIN ADULT) Oral Tab Take by mouth.      atorvastatin 10 MG Oral Tab       alendronate 70 MG Oral Tab Take 1 tablet (70 mg total) by mouth once a week.      cyanocobalamine 500 MCG Oral Tab Take 1 tablet (500 mcg total) by mouth 3 (three) times a week.      clotrimazole-betamethasone 1-0.05 % External Cream PRN      Aspirin (ASPIR-LOW OR) Take 81 mg by mouth 3 (three) times a week.      Midodrine HCl 5 MG Oral Tab Take 1 tablet (5 mg total) by mouth in the morning and 1 tablet (5 mg total) before bedtime.      ferrous sulfate 325 (65 FE) MG Oral Tab EC Take 1 tablet (325 mg total) by mouth daily with breakfast. 30 tablet 0    Calcium Carbonate-Vitamin D 500-400 MG-UNIT Oral Tab Take 1 tablet by mouth daily.       No current facility-administered medications on file prior to visit.

## 2025-06-29 ENCOUNTER — LAB ENCOUNTER (OUTPATIENT)
Dept: LAB | Facility: HOSPITAL | Age: 80
End: 2025-06-29
Attending: INTERNAL MEDICINE
Payer: MEDICARE

## 2025-06-29 DIAGNOSIS — E55.9 VITAMIN D DEFICIENCY: ICD-10-CM

## 2025-06-29 DIAGNOSIS — E78.5 HYPERLIPIDEMIA: Primary | ICD-10-CM

## 2025-06-29 DIAGNOSIS — D64.9 ANEMIA: ICD-10-CM

## 2025-06-29 LAB
ALBUMIN SERPL-MCNC: 4.7 G/DL (ref 3.2–4.8)
ALBUMIN/GLOB SERPL: 1.9 {RATIO} (ref 1–2)
ALP LIVER SERPL-CCNC: 65 U/L (ref 55–142)
ALT SERPL-CCNC: 11 U/L (ref 10–49)
ANION GAP SERPL CALC-SCNC: 7 MMOL/L (ref 0–18)
AST SERPL-CCNC: 24 U/L (ref ?–34)
BASOPHILS # BLD AUTO: 0.03 X10(3) UL (ref 0–0.2)
BASOPHILS NFR BLD AUTO: 0.8 %
BILIRUB SERPL-MCNC: 1 MG/DL (ref 0.2–1.1)
BUN BLD-MCNC: 11 MG/DL (ref 9–23)
BUN/CREAT SERPL: 11.8 (ref 10–20)
CALCIUM BLD-MCNC: 9.3 MG/DL (ref 8.7–10.4)
CHLORIDE SERPL-SCNC: 98 MMOL/L (ref 98–112)
CHOLEST SERPL-MCNC: 147 MG/DL (ref ?–200)
CO2 SERPL-SCNC: 28 MMOL/L (ref 21–32)
CREAT BLD-MCNC: 0.93 MG/DL (ref 0.55–1.02)
DEPRECATED RDW RBC AUTO: 45.1 FL (ref 35.1–46.3)
EGFRCR SERPLBLD CKD-EPI 2021: 63 ML/MIN/1.73M2 (ref 60–?)
EOSINOPHIL # BLD AUTO: 0.05 X10(3) UL (ref 0–0.7)
EOSINOPHIL NFR BLD AUTO: 1.4 %
ERYTHROCYTE [DISTWIDTH] IN BLOOD BY AUTOMATED COUNT: 12.6 % (ref 11–15)
FASTING PATIENT LIPID ANSWER: YES
FASTING STATUS PATIENT QL REPORTED: YES
GLOBULIN PLAS-MCNC: 2.5 G/DL (ref 2–3.5)
GLUCOSE BLD-MCNC: 115 MG/DL (ref 70–99)
HCT VFR BLD AUTO: 38 % (ref 35–48)
HDLC SERPL-MCNC: 78 MG/DL (ref 40–59)
HGB BLD-MCNC: 12.6 G/DL (ref 12–16)
IMM GRANULOCYTES # BLD AUTO: 0 X10(3) UL (ref 0–1)
IMM GRANULOCYTES NFR BLD: 0 %
IRON SATN MFR SERPL: 24 % (ref 15–50)
IRON SERPL-MCNC: 75 UG/DL (ref 50–170)
LDLC SERPL CALC-MCNC: 54 MG/DL (ref ?–100)
LYMPHOCYTES # BLD AUTO: 1.17 X10(3) UL (ref 1–4)
LYMPHOCYTES NFR BLD AUTO: 31.7 %
MCH RBC QN AUTO: 31.8 PG (ref 26–34)
MCHC RBC AUTO-ENTMCNC: 33.2 G/DL (ref 31–37)
MCV RBC AUTO: 96 FL (ref 80–100)
MONOCYTES # BLD AUTO: 0.27 X10(3) UL (ref 0.1–1)
MONOCYTES NFR BLD AUTO: 7.3 %
NEUTROPHILS # BLD AUTO: 2.17 X10 (3) UL (ref 1.5–7.7)
NEUTROPHILS # BLD AUTO: 2.17 X10(3) UL (ref 1.5–7.7)
NEUTROPHILS NFR BLD AUTO: 58.8 %
NONHDLC SERPL-MCNC: 69 MG/DL (ref ?–130)
OSMOLALITY SERPL CALC.SUM OF ELEC: 276 MOSM/KG (ref 275–295)
PLATELET # BLD AUTO: 157 10(3)UL (ref 150–450)
POTASSIUM SERPL-SCNC: 4.1 MMOL/L (ref 3.5–5.1)
PROT SERPL-MCNC: 7.2 G/DL (ref 5.7–8.2)
RBC # BLD AUTO: 3.96 X10(6)UL (ref 3.8–5.3)
SODIUM SERPL-SCNC: 133 MMOL/L (ref 136–145)
TOTAL IRON BINDING CAPACITY: 308 UG/DL (ref 250–425)
TRANSFERRIN SERPL-MCNC: 236 MG/DL (ref 250–380)
TRIGL SERPL-MCNC: 76 MG/DL (ref 30–149)
VIT B12 SERPL-MCNC: 1024 PG/ML (ref 211–911)
VIT D+METAB SERPL-MCNC: 66.9 NG/ML (ref 30–100)
VLDLC SERPL CALC-MCNC: 11 MG/DL (ref 0–30)
WBC # BLD AUTO: 3.7 X10(3) UL (ref 4–11)

## 2025-06-29 PROCEDURE — 83036 HEMOGLOBIN GLYCOSYLATED A1C: CPT

## 2025-06-29 PROCEDURE — 36415 COLL VENOUS BLD VENIPUNCTURE: CPT

## 2025-06-29 PROCEDURE — 82607 VITAMIN B-12: CPT

## 2025-06-29 PROCEDURE — 80053 COMPREHEN METABOLIC PANEL: CPT

## 2025-06-29 PROCEDURE — 82306 VITAMIN D 25 HYDROXY: CPT

## 2025-06-29 PROCEDURE — 85025 COMPLETE CBC W/AUTO DIFF WBC: CPT

## 2025-06-29 PROCEDURE — 84466 ASSAY OF TRANSFERRIN: CPT

## 2025-06-29 PROCEDURE — 80061 LIPID PANEL: CPT

## 2025-06-29 PROCEDURE — 83540 ASSAY OF IRON: CPT

## 2025-06-30 LAB
EST. AVERAGE GLUCOSE BLD GHB EST-MCNC: 123 MG/DL (ref 68–126)
HBA1C MFR BLD: 5.9 % (ref ?–5.7)

## (undated) DEVICE — SOL  .9 1000ML BTL

## (undated) DEVICE — COVER SLV UNV DISP NTR STRL LF

## (undated) DEVICE — 1010 S-DRAPE TOWEL DRAPE 10/BX: Brand: STERI-DRAPE™

## (undated) DEVICE — PROXIMATE SKIN STAPLERS (35 WIDE) CONTAINS 35 STAINLESS STEEL STAPLES (FIXED HEAD): Brand: PROXIMATE

## (undated) DEVICE — ABDOMINAL PAD: Brand: CURITY

## (undated) DEVICE — Device

## (undated) DEVICE — SUTURE VICRYL 2-0 FS-1

## (undated) DEVICE — SOLUTION SURG DURA PREP HAZMAT

## (undated) DEVICE — DRAIN SILICONE RND 1/8

## (undated) DEVICE — HIP PINNING: Brand: MEDLINE INDUSTRIES, INC.

## (undated) DEVICE — 5.0MM DIAMOND ROUND BUR

## (undated) DEVICE — SPLINT PRECUT ORTHOGLASS 4X15

## (undated) DEVICE — CHLORAPREP 26ML APPLICATOR

## (undated) DEVICE — OCCLUSIVE GAUZE STRIP OVERWRAP,3% BISMUTH TRIBROMOPHENATE IN PETROLATUM BLEND: Brand: XEROFORM

## (undated) DEVICE — VIOLET BRAIDED (POLYGLACTIN 910), SYNTHETIC ABSORBABLE SUTURE: Brand: COATED VICRYL

## (undated) DEVICE — ENDOSCOPY PACK UPPER: Brand: MEDLINE INDUSTRIES, INC.

## (undated) DEVICE — PIN FX 3MM NTR NL SM THRD TIB

## (undated) DEVICE — WEBRIL COTTON UNDERCAST PADDING: Brand: WEBRIL

## (undated) DEVICE — REM POLYHESIVE ADULT PATIENT RETURN ELECTRODE: Brand: VALLEYLAB

## (undated) DEVICE — BATTERY

## (undated) DEVICE — SPLINT PLASTER 4

## (undated) DEVICE — ESMARK: Brand: DEROYAL

## (undated) DEVICE — 3M™ MICROFOAM™ TAPE 1528-4: Brand: 3M™ MICROFOAM™

## (undated) DEVICE — COVER SGL STRL LGHT HNDL BLU

## (undated) DEVICE — SYRINGE REGULAR TIP 60ML

## (undated) DEVICE — PETROLATUM GAUZE CISION DRESSING: Brand: VASELINE

## (undated) DEVICE — CANNULA NASAL ADULT PIGTAIL L7

## (undated) DEVICE — TOWEL OR BLU 16X26 STRL

## (undated) DEVICE — SUTURE VICRYL 1 CT-1

## (undated) DEVICE — SUCTION CANISTER, 3000CC,SAFELINER: Brand: DEROYAL

## (undated) DEVICE — TUBING SCT CLR 6FT .25IN MDVC

## (undated) DEVICE — PRECISION MATCH HEAD

## (undated) DEVICE — DRAPE C-ARM UNIVERSAL

## (undated) DEVICE — SPLINT ORTH 20FTX4IN 15 LYR PD

## (undated) DEVICE — SUTURE VICRYL 0 J340H

## (undated) DEVICE — SNARE POLYP SM W13MMXL240CM SHTH DIA2.4MM OVL

## (undated) DEVICE — UPPER EXTREMITY: Brand: MEDLINE INDUSTRIES, INC.

## (undated) DEVICE — FORCEP RADIAL JAW 4

## (undated) DEVICE — GAUZE SPONGES,12 PLY: Brand: CURITY

## (undated) DEVICE — KIT CLEAN ENDOKIT 1.1OZ GOWNX2

## (undated) DEVICE — KIT DRN 3/16IN PVC DRN 3 SPRG

## (undated) DEVICE — GOWN SURG AERO BLUE PERF LG

## (undated) DEVICE — SNARE OPTMZ PLPCTM TRP

## (undated) DEVICE — TRAY SRGPRP PVP IOD WT SCRB SM

## (undated) DEVICE — VASCULAR LOOPS, STERILE, RED, MAXI, 30" LONG, 2/PKG: Brand: KEY SURGICAL VASCULAR LOOPS

## (undated) DEVICE — GAMMEX® PI HYBRID SIZE 7, STERILE POWDER-FREE SURGICAL GLOVE, POLYISOPRENE AND NEOPRENE BLEND: Brand: GAMMEX

## (undated) DEVICE — SUTURE FIBERWIRE S AR-7200

## (undated) DEVICE — STERILE TETRA-FLEX CF, ELASTIC BANDAGE, 4" X 5.5YD: Brand: TETRA-FLEX™CF

## (undated) DEVICE — GUIDEWIRE ORTH 100CM 3MM TIB

## (undated) DEVICE — KIT DRN 1/8IN PVC 3 SPRG EVAC

## (undated) DEVICE — STERILE LATEX POWDER-FREE SURGICAL GLOVESWITH NITRILE COATING: Brand: PROTEXIS

## (undated) DEVICE — DRAPE SHEET LG

## (undated) DEVICE — SUTURE ETHILON 3-0 669H

## (undated) DEVICE — BOWL CEMENT MIX QUICK-VAC

## (undated) DEVICE — Device: Brand: DEFENDO AIR/WATER/SUCTION AND BIOPSY VALVE

## (undated) DEVICE — GAMMEX® PI HYBRID SIZE 6.5, STERILE POWDER-FREE SURGICAL GLOVE, POLYISOPRENE AND NEOPRENE BLEND: Brand: GAMMEX

## (undated) DEVICE — SPONGE LAP 18X18 XRAY STRL

## (undated) DEVICE — SUTURE ETHIBOND 1-0 CX30D

## (undated) DEVICE — PRECISION (9.0 X 0.51 X 31.0MM)

## (undated) DEVICE — COTTON UNDERCAST PADDING,REGULAR FINISH: Brand: WEBRIL

## (undated) DEVICE — KIT ENDO ORCAPOD 160/180/190

## (undated) DEVICE — ENCORE® LATEX ACCLAIM SIZE 8, STERILE LATEX POWDER-FREE SURGICAL GLOVE: Brand: ENCORE

## (undated) DEVICE — INTENT TO BE USED WITH SUTURE MATERIAL FOR TISSUE CLOSURE: Brand: RICHARD-ALLAN® NEEDLE STRAIGHT CUTTING

## (undated) DEVICE — ENDOSCOPY PACK - LOWER: Brand: MEDLINE INDUSTRIES, INC.

## (undated) NOTE — LETTER
[address], [place]  Authorization for Invasive Procedures  1.  I hereby authorize Dr. Teddy Morales , my physician and whomever may be designated as the doctor's assistant, to perform the following operation and/or procedure:  Chung Gregory performed for the purposes of advancing medicine, science, and/or education, provided my identity is not revealed. If the procedure has been videotaped, the physician/surgeon will obtain the original videotape.  The hospital will not be responsible for stor My signature below affirms that prior to the time of the procedure, I have explained to the patient and/or her legal representative, the risks and benefits involved in the proposed treatment and any reasonable alternative to the proposed treatment.  I have

## (undated) NOTE — LETTER
[facility]  [address], [place], [place]  Authorization for Surgical Operation and Procedure                                                                                           I hereby authorize Yousfu Silva MD, my physician and his/her assistants (if applicable), which may include medical students, residents, and/or fellows, to perform the following surgical operation/ procedure and administer such anesthesia as may be determined necessary by my physician: Operation/Procedure name (s) COLONOSCOPY on Quynh T Ni   2.   I recognize that during the surgical operation/procedure, unforeseen conditions may necessitate additional or different procedures than those listed above.  I, therefore, further authorize and request that the above-named surgeon, assistants, or designees perform such procedures as are, in their judgment, necessary and desirable.    3.   My surgeon/physician has discussed prior to my surgery the potential benefits, risks and side effects of this procedure; the likelihood of achieving goals; and potential problems that might occur during recuperation.  They also discussed reasonable alternatives to the procedure, including risks, benefits, and side effects related to the alternatives and risks related to not receiving this procedure.  I have had all my questions answered and I acknowledge that no guarantee has been made as to the result that may be obtained.    4.   Should the need arise during my operation/procedure, which includes change of level of care prior to discharge, I also consent to the administration of blood and/or blood products.  Further, I understand that despite careful testing and screening of blood or blood products by collecting agencies, I may still be subject to ill effects as a result of receiving a blood transfusion and/or blood products.  The following are some, but not all, of the potential risks that can occur: fever and allergic  reactions, hemolytic reactions, transmission of diseases such as Hepatitis, AIDS and Cytomegalovirus (CMV) and fluid overload.  In the event that I wish to have an autologous transfusion of my own blood, or a directed donor transfusion, I will discuss this with my physician.  Check only if Refusing Blood or Blood Products  I understand refusal of blood or blood products as deemed necessary by my physician may have serious consequences to my condition to include possible death. I hereby assume responsibility for my refusal and release the hospital, its personnel, and my physicians from any responsibility for the consequences of my refusal.    o  Refuse   5.   I authorize the use of any specimen, organs, tissues, body parts or foreign objects that may be removed from my body during the operation/procedure for diagnosis, research or teaching purposes and their subsequent disposal by hospital authorities.  I also authorize the release of specimen test results and/or written reports to my treating physician on the hospital medical staff or other referring or consulting physicians involved in my care, at the discretion of the Pathologist or my treating physician.    6.   I consent to the photographing or videotaping of the operations or procedures to be performed, including appropriate portions of my body for medical, scientific, or educational purposes, provided my identity is not revealed by the pictures or by descriptive texts accompanying them.  If the procedure has been photographed/videotaped, the surgeon will obtain the original picture, image, videotape or CD.  The hospital will not be responsible for storage, release or maintenance of the picture, image, tape or CD.    7.   I consent to the presence of a  or observers in the operating room as deemed necessary by my physician or their designees.    8.   I recognize that in the event my procedure results in extended X-Ray/fluoroscopy time, I may  develop a skin reaction.    9. If I have a Do Not Attempt Resuscitation (DNAR) order in place, that status will be suspended while in the operating room, procedural suite, and during the recovery period unless otherwise explicitly stated by me (or a person authorized to consent on my behalf). The surgeon or my attending physician will determine when the applicable recovery period ends for purposes of reinstating the DNAR order.  10. Patients having a sterilization procedure: I understand that if the procedure is successful the results will be permanent and it will therefore be impossible for me to inseminate, conceive, or bear children.  I also understand that the procedure is intended to result in sterility, although the result has not been guaranteed.   11. I acknowledge that my physician has explained sedation/analgesia administration to me including the risk and benefits I consent to the administration of sedation/analgesia as may be necessary or desirable in the judgment of my physician.    I CERTIFY THAT I HAVE READ AND FULLY UNDERSTAND THE ABOVE CONSENT TO OPERATION and/or OTHER PROCEDURE.     _________________________________________ _________________________________     ___________________________________  Signature of Patient     Signature of Responsible Person                   Printed Name of Responsible Person                              _________________________________________ ______________________________        ___________________________________  Signature of Witness         Date  Time         Relationship to Patient    STATEMENT OF PHYSICIAN My signature below affirms that prior to the time of the procedure; I have explained to the patient and/or his/her legal representative, the risks and benefits involved in the proposed treatment and any reasonable alternative to the proposed treatment. I have also explained the risks and benefits involved in refusal of the proposed treatment and alternatives  to the proposed treatment and have answered the patient's questions. If I have a significant financial interest in a co-management agreement or a significant financial interest in any product or implant, or other significant relationship used in this procedure/surgery, I have disclosed this and had a discussion with my patient.     _______________________________________________________________ _____________________________  (Signature of Physician)                                                                                         (Date)                                   (Time)  Patient Name: Quynh MORALES Yessy    : 1945   Printed: 2024      Medical Record #: [id number]                                              Page 1 of 1

## (undated) NOTE — LETTER
[facility]  Administration of Anesthesia  Quynh BHATTI agree to be cared for by a physician anesthesiologist alone and/or with a nurse anesthetist, who is specially trained to monitor me and give me medicine to put me to sleep or keep me comfortable during my procedure    I understand that my anesthesiologist and/or anesthetist is not an employee or agent of [facility] or [employer]. He or she works for [facility] [facility]    As the patient asking for anesthesia services, I agree to:  Allow the anesthesiologist (anesthesia doctor) to give me medicine and do additional procedures as necessary. Some examples are: Starting or using an “IV” to give me medicine, fluids or blood during my procedure, and having a breathing tube placed to help me breathe when I’m asleep (intubation). In the event that my heart stops working properly, I understand that my anesthesiologist will make every effort to sustain my life, unless otherwise directed by [facility] Do Not Resuscitate documents.  Tell my anesthesia doctor before my procedure:  If I am pregnant.  The last time that I ate or drank.  iii. All of the medicines I take (including prescriptions, herbal supplements, and pills I can buy without a prescription (including street drugs/illegal medications). Failure to inform my anesthesiologist about these medicines may increase my risk of anesthetic complications.  iv.If I am allergic to anything or have had a reaction to anesthesia before.  I understand how the anesthesia medicine will help me (benefits).  I understand that with any type of anesthesia medicine there are risks:  The most common risks are: nausea, vomiting, sore throat, muscle soreness, damage to my eyes, mouth, or teeth (from breathing tube placement).  Rare risks include: remembering what happened during my procedure, allergic reactions to medications, injury to my airway, heart, lungs, vision, nerves, or muscles  and in extremely rare instances death.  My doctor has explained to me other choices available to me for my care (alternatives).  Pregnant Patients (“epidural”):  I understand that the risks of having an epidural (medicine given into my back to help control pain during labor), include itching, low blood pressure, difficulty urinating, headache or slowing of the baby’s heart. Very rare risks include infection, bleeding, seizure, irregular heart rhythms and nerve injury.  Regional Anesthesia (“spinal”, “epidural”, & “nerve blocks”):  I understand that rare but potential complications include headache, bleeding, infection, seizure, irregular heart rhythms, and nerve injury.    _____________________________________________________________________________  Patient (or Representative) Signature/Relationship to Patient  Date   Time    _____________________________________________________________________________   Name (if used)    Language/Organization   Time    _____________________________________________________________________________  Nurse Anesthetist Signature     Date   Time  _____________________________________________________________________________  Anesthesiologist Signature     Date   Time  I have discussed the procedure and information above with the patient (or patient’s representative) and answered their questions. The patient or their representative has agreed to have anesthesia services.    _____________________________________________________________________________  Witness        Date   Time  I have verified that the signature is that of the patient or patient’s representative, and that it was signed before the procedure  Patient Name: Quynh Krishnamurthy     : 1945                 Printed: 2024 at 7:41 AM    Medical Record #: [id number]                                            Page 1 of 1  ----------ANESTHESIA CONSENT----------

## (undated) NOTE — LETTER
Shannan Morocho Md  [address] [place] [address]  [address]  [address], [address]       04/24/18        Patient: Orlando Berry   YOB: 1945   Date of Visit: 4/23/2018       Dear  Dr. Flavia Ford MD,      Thank you for referring Perry Kristi to my practic

## (undated) NOTE — LETTER
[address], [place]  Authorization for Invasive Procedures  1.  I hereby authorize Dr. Nirav Donohue , my physician and whomever may be designated as the doctor's assistant, to perform the following operation and/or procedure:  Loop Recor performed for the purposes of advancing medicine, science, and/or education, provided my identity is not revealed. If the procedure has been videotaped, the physician/surgeon will obtain the original videotape.  The hospital will not be responsible for stor My signature below affirms that prior to the time of the procedure, I have explained to the patient and/or her legal representative, the risks and benefits involved in the proposed treatment and any reasonable alternative to the proposed treatment.  I have

## (undated) NOTE — LETTER
[address] [place] [address]  Authorization for Invasive Procedures  1.  I hereby authorize Dr. Jesus Mccord , my physician and whomever may be designated as the doctor's assistant, to perform the following operation and/or procedure:  Linq Remov performed for the purposes of advancing medicine, science, and/or education, provided my identity is not revealed. If the procedure has been videotaped, the physician/surgeon will obtain the original videotape.  The hospital will not be responsible for stor My signature below affirms that prior to the time of the procedure, I have explained to the patient and/or her legal representative, the risks and benefits involved in the proposed treatment and any reasonable alternative to the proposed treatment.  I have

## (undated) NOTE — IP AVS SNAPSHOT
[address]  [address] [facility] [phone]                Discharge Summary   3/1/2017    [facility]           Admission Information        Provider Department    3/1/2017 Tashia Park MD [facility] 4w/Sw/Se Commonly known as:  [facility]CAL-500   Next dose due:  START IN THE MORNING        Take 1 tablet by mouth daily.          9 AM                   ergocalciferol 49123 units Caps   Last time this was given:  50,000 Units on 3/6/2017  5:40 AM   Commonly known as:   Follow up with Jarrett Lozano MD.    Specialty:  ENDOCRINOLOGY    Why:  after her rehab    Contact information:    [address] [place] [address]  [address] [facility] [address] [facility] [address]          Follow up with Ilene Mcmullen MD.    Specialty:  SURGERY, [facility] 38 (03/02/17)  26      Metabolic Lab Results  (Last result in the past 90 days)    ALT Bilirubin,Total Total Protein Albumin Sodium Potassium Chloride    (03/02/17)  16 (03/02/17)  1.0 (03/02/17)  5.2 (L) (03/02/17)  2.9 (L) (03/06/17)  140 (03/06/17)  4. 3 Support Staff. Remember, MyChart is NOT to be used for urgent needs.   For medical emergencies, dial 911.             _____________________________________________________________________________    Medication Side Effects - Medications to be taken at home retention (inability to urinate)   What to report to your healthcare team: Difficulty urinating, dizziness, no bowel movement in 2+ days, unresolved pain           All Other Medications     multivitamin Oral Tab    omega-3 fatty acids 1000 MG Oral Cap    e

## (undated) NOTE — LETTER
[facility], [facility]  Authorization for Invasive Procedures  1.  I hereby authorize Dr. Aide Canales , my physician and whomever may be designated as the doctor's assistant, to perform the following operation and/or procedure:  Loop Recor 4. Should the need arise during my operation or immediate post-operative period; I also consent to the administration of blood and/or blood products.  Further, I understand that despite careful testing and screening of blood and blood products, I may still 9. Patients having a sterilization procedure: I understand that if the procedure is successful the results will be permanent and it will therefore be impossible for me to inseminate, conceive or bear children.  I also understand that the procedure is intend

## (undated) NOTE — LETTER
[address] [place] [address]  Authorization for Invasive Procedures  1.  I hereby authorize Dr. Kelsy Bergman , my physician and whomever may be designated as the doctor's assistant, to perform the following operation and/or procedure:  Madelyn Inser performed for the purposes of advancing medicine, science, and/or education, provided my identity is not revealed. If the procedure has been videotaped, the physician/surgeon will obtain the original videotape.  The hospital will not be responsible for stor My signature below affirms that prior to the time of the procedure, I have explained to the patient and/or her legal representative, the risks and benefits involved in the proposed treatment and any reasonable alternative to the proposed treatment.  I have